# Patient Record
Sex: FEMALE | Race: WHITE | NOT HISPANIC OR LATINO | Employment: FULL TIME | URBAN - NONMETROPOLITAN AREA
[De-identification: names, ages, dates, MRNs, and addresses within clinical notes are randomized per-mention and may not be internally consistent; named-entity substitution may affect disease eponyms.]

---

## 2017-04-19 ENCOUNTER — OFFICE VISIT (OUTPATIENT)
Dept: MEDICAL GROUP | Facility: PHYSICIAN GROUP | Age: 47
End: 2017-04-19
Payer: COMMERCIAL

## 2017-04-19 VITALS
OXYGEN SATURATION: 97 % | DIASTOLIC BLOOD PRESSURE: 76 MMHG | HEART RATE: 107 BPM | RESPIRATION RATE: 18 BRPM | TEMPERATURE: 97.3 F | SYSTOLIC BLOOD PRESSURE: 118 MMHG | WEIGHT: 168 LBS | BODY MASS INDEX: 29.77 KG/M2 | HEIGHT: 63 IN

## 2017-04-19 DIAGNOSIS — B35.1 ONYCHOMYCOSIS: ICD-10-CM

## 2017-04-19 DIAGNOSIS — E04.1 THYROID NODULE: ICD-10-CM

## 2017-04-19 DIAGNOSIS — F41.9 ANXIETY: ICD-10-CM

## 2017-04-19 DIAGNOSIS — H57.11 PAIN OF RIGHT EYE: ICD-10-CM

## 2017-04-19 DIAGNOSIS — J31.0 RHINITIS, UNSPECIFIED TYPE: ICD-10-CM

## 2017-04-19 DIAGNOSIS — E66.9 OBESITY (BMI 35.0-39.9 WITHOUT COMORBIDITY): ICD-10-CM

## 2017-04-19 DIAGNOSIS — R73.01 ELEVATED FASTING GLUCOSE: ICD-10-CM

## 2017-04-19 DIAGNOSIS — E78.00 HIGH CHOLESTEROL: ICD-10-CM

## 2017-04-19 DIAGNOSIS — E55.9 VITAMIN D DEFICIENCY: ICD-10-CM

## 2017-04-19 PROCEDURE — 99214 OFFICE O/P EST MOD 30 MIN: CPT | Performed by: NURSE PRACTITIONER

## 2017-04-19 ASSESSMENT — PATIENT HEALTH QUESTIONNAIRE - PHQ9: CLINICAL INTERPRETATION OF PHQ2 SCORE: 1

## 2017-04-19 NOTE — PROGRESS NOTES
[facility]  Primary Care Office Visit - Problem-Oriented        History:     Sury Vidal is a 47 y.o. female who is here today to discuss Establish Care and Body Aches      Rhinitis  47-year-old female who suffers from chronic rhinitis and seasonal allergies. She is currently taking claratin D, her symptoms have improved significantly since adding a decongestant to her regimen this does make her a little jittery, she does have an increased pulse. She continues to have some nasal itching, scratchy throat, sensation of enlarged scratchy tongue which she does tell me has significantly improved with claratin D. She does not take any medications on a regular basis though she does tell me that she takes many herbal supplements. She denies throat swelling, trouble breathing, wheezing, coughing. She has had allergy testing, she has a slight sensitivity to cow milk. Discussed signs and symptoms of anaphylaxis and reasons to go to ER, she verbalizes understanding. At this time I would like her to continue Claritin D, start flonase and monitor for tongue or throat swelling as discussed above.          Anxiety  This is a 47-year-old female who is here to establish care and discuss multiple acute and chronic issues. She has struggled with anxiety for many years, one point she was on Zoloft and Xanax as needed.  She continues rare use of Xanax which was prescribed by her previous PCP. She is working on stress and anxiety modifications, exercise.    Onychomycosis  Patient continues to suffer from left foot onychomycosis. She has used over-the-counter antifungal spray without improvement in her symptoms.  She has thickened yellow toenails on fourth and fifth toe as well as her left great toe.  She has no itching or skin changes. She has seen podiatry who recommends diluted bleach.       Elevated fasting glucose  Patient continues to work on lifestyle modifications and exercise. She is due for fasting  "labs.    High cholesterol  As noted and this is a 47-year-old female with history of mixed hyperlipidemia, she is due for fasting labs. She is no longer on a statin. She is using over-the-counter omega-3 fatty acid. She prefers to avoid statin and take anymore \"natural route.\" She continues to work on diet and exercise.    Thyroid nodule  This is a 47-year-old female who was diagnosed with a thyroid nodule following a [employer] screening. She continues to have yearly thyroid ultrasound, last done in 2016 and normal. She has had a biopsy in 2014, unfortunately sample was insufficient.     Vitamin D deficiency  Has been over a year since vitamin D was checked, we will at least her labs today. She occasionally takes over-the-counter vitamin D.    Pain of right eye  Patient reports intermittent stabbing right eye pain, this is insidious in onset, resolves quickly on its own lasting only a few seconds.  She thinks this may be related to dry eye. She does not take any regular medications. She has had no vision changes. She is due to see her optometrist next month.           Past Medical History   Diagnosis Date   • GERD (gastroesophageal reflux disease)    • High cholesterol    • Anxiety    • AR (allergic rhinitis)    • Dizziness    • Heart palpitations    • Shortness of breath    • Tachycardia    • Elevated fasting glucose 11/27/2013      after 12 hour fast   • Hemorrhoids 10/1/2014     Past Surgical History   Procedure Laterality Date   • Salpingo-oophorectomy, unilateral       Social History     Social History   • Marital Status: Single     Spouse Name: N/A   • Number of Children: N/A   • Years of Education: N/A     Occupational History   • Not on file.     Social History Main Topics   • Smoking status: Former Smoker     Quit date: 11/27/2011   • Smokeless tobacco: Never Used   • Alcohol Use: No   • Drug Use: No   • Sexual Activity:     Partners: Male      Comment: one male partner     Other Topics Concern   • Not " "on file     Social History Narrative     History   Smoking status   • Former Smoker   • Quit date: 11/27/2011   Smokeless tobacco   • Never Used     Family History   Problem Relation Age of Onset   • Hyperlipidemia Mother    • Thyroid Mother      cancer   • Hyperlipidemia Father    • Thyroid Sister    • Diabetes Neg Hx    • Cancer Neg Hx      Allergies   Allergen Reactions   • Codeine        Problem List:     Patient Active Problem List    Diagnosis Date Noted   • Onychomycosis 04/19/2017   • Obesity (BMI 35.0-39.9 without comorbidity) (HCC) 04/19/2017   • Pain of right eye 04/19/2017   • Hemorrhoids 10/01/2014   • Thyroid nodule 07/16/2014   • Cervical disc disorder with radiculopathy 05/21/2014   • Rhinitis 02/26/2014   • Depression 02/26/2014   • Vitamin D deficiency 11/27/2013   • Elevated fasting glucose 11/27/2013   • Stress 03/07/2013   • GERD (gastroesophageal reflux disease)    • High cholesterol    • Anxiety          Medications:     Current outpatient prescriptions:   •  Multiple Vitamins-Minerals (MULTI FOR HER PO), Take  by mouth., Disp: , Rfl:   •  Loratadine (CLARITIN PO), Take  by mouth., Disp: , Rfl:   •  Omega-3 Fatty Acids (OMEGA 3 PO), Take  by mouth., Disp: , Rfl:       Review of Systems:     Positives per HPI, all other systems reviewed and WNL       Physical Assessment:     VS: /76 mmHg  Pulse 107  Temp(Src) 36.3 °C (97.3 °F)  Resp 18  Ht 1.588 m (5' 2.52\")  Wt 76.204 kg (168 lb)  BMI 30.22 kg/m2  SpO2 97%  LMP 03/21/2017    General: Well-developed, well-nourished, female, obese body habitus     Head: PERRL, EOMI. Normocephalic, oropharynx otherwise WNL. No facial asymmetry noted.  Neck: Neck supple, no thyromegaly  Cardiovasc:No JVD.  RRR, no MRG. No thrills or bruits. Pulses 2+ and symmetric at all distal extremities.  Pulmonary: Lungs clear bilaterally.  Normal respiratory effort. No wheeze or crackles.   Extremities: No edema, no TTP bilateral calves. Pedal pulses intact. No " joint effusions. LEs warm and well-perfused.  Neuro: Alert and oriented, CNs II-XII intact, no focal deficits.  Skin:No rashes noted. Skin warm, dry, intact    Lymph: No cervical, pre- or post-auricular, submandibular, occipital lymphadenopathy.    Psych: Dressed appropriately for the weather, pleasant and conversant.  Affect, mood & judgment appropriate.      Assessment/Plan:   Sury was seen today for establish care and body aches.    Diagnoses and all orders for this visit:    Thyroid nodule, stable  - continue to monitor annually   -     US-SOFT TISSUES OF HEAD - NECK; Future  -     TSH; Future  -     FREE THYROXINE; Future    Vitamin D deficiency, unclear control   -     VITAMIN D,25 HYDROXY; Future    High cholesterol, unclear control   - discussed TLM & OTC herbal supplements   -     LIPID PROFILE; Future    Elevated fasting glucose, unclear control   -     COMP METABOLIC PANEL; Future    Rhinitis, unspecified type, somewhat controlled   - continue claritin D, start flonase daily, follow up if sx worsen or persist.     Anxiety, somewhat controlled   - reinforce stress/anxiety management techniques, she continues rare use of xanax prescribed by her previous PCP.     Onychomycosis, ongoing, uncontrolled   - trial diluted water + bleach as recommended by podiatry, consider antifungal nail varnish. If sx persist she will follow up and we will consider oral Lamisil.     Obesity (BMI 35.0-39.9 without comorbidity) (HCC)  -     Patient identified as having weight management issue.  Appropriate orders and counseling given.    Pain of right eye, new   - follow up with optometry       HC maintenance- she has mammogram scheduled for next month     Patient is agreeable to the above plan and voiced understanding. All questions answered.     Please note that this dictation was created using voice recognition software. I have made every reasonable attempt to correct obvious errors, but I expect that there are errors of  grammar and possibly content that I did not discover before finalizing the note.      MARVIN Johnson  4/19/2017, 10:27 AM

## 2017-04-19 NOTE — MR AVS SNAPSHOT
"        Sury Abreu [place]   2017 9:40 AM   Office Visit   MRN: [id number]    Department:  [facility]   Dept Phone:  [phone]    Description:  Female : 1970   Provider:  PRITESH Light           Reason for Visit     Establish Care thyroid, allergies.     Body Aches pt thinks its specifically muscle related.       Allergies as of 2017     Allergen Noted Reactions    Codeine 2011         You were diagnosed with     Thyroid nodule   [038208]       Vitamin D deficiency   [6631265]       High cholesterol   [352084]       Elevated fasting glucose   [068548]       Rhinitis, unspecified type   [1878574]       Anxiety   [127962]       Onychomycosis   [275922]       Obesity (BMI 35.0-39.9 without comorbidity) ([facility])   [249154]         Vital Signs     Blood Pressure Pulse Temperature Respirations Height Weight    118/76 mmHg 107 36.3 °C (97.3 °F) 18 1.588 m (5' 2.52\") 76.204 kg (168 lb)    Body Mass Index Oxygen Saturation Last Menstrual Period Smoking Status          30.22 kg/m2 97% 2017 Former Smoker        Basic Information     Date Of Birth Sex Race Ethnicity Preferred Language    1970 Female Unknown Non- English      Your appointments     2017 10:00 AM   Adult Draw/Collection with LISSETH FOSTER (--)    [address] [facility] [address]   [phone]            2017  2:10 PM   MA [id number] with RB MG 2   [facility] ([address])    [address] [facility] [address]  [facility] [address] [phone]           No deodorant, powder, perfume or lotion under the arm or breast area.              Problem List              ICD-10-CM Priority Class Noted - Resolved    GERD (gastroesophageal reflux disease) K21.9   Unknown - Present    High cholesterol E78.00   Unknown - Present    Anxiety F41.9   Unknown - Present    Stress F43.9   3/7/2013 - Present    Vitamin D deficiency E55.9   2013 - Present    Elevated " fasting glucose R73.01   11/27/2013 - Present    Rhinitis J31.0   2/26/2014 - Present    Depression F32.9   2/26/2014 - Present    Cervical disc disorder with radiculopathy    5/21/2014 - Present    Thyroid nodule E04.1   7/16/2014 - Present    Hemorrhoids K64.9   10/1/2014 - Present    Onychomycosis B35.1   4/19/2017 - Present    Obesity (BMI 35.0-39.9 without comorbidity) ([facility]) E66.9   4/19/2017 - Present      Health Maintenance        Date Due Completion Dates    MAMMOGRAM 2/5/2017 2/5/2016, 2/20/2015, 2/10/2015, 12/24/2013    PAP SMEAR 4/4/2019 4/4/2016, 11/27/2013, 8/15/2009 (Prv Comp)    Override on 8/15/2009: Previously completed (normal)    IMM DTaP/Tdap/Td Vaccine (2 - Td) 5/23/2022 5/23/2012    COLONOSCOPY 8/15/2022 8/15/2012 (Prv Comp)    Override on 8/15/2012: Previously completed (GIC, normal)            Current Immunizations     Tdap Vaccine 5/23/2012      Below and/or attached are the medications your provider expects you to take. Review all of your home medications and newly ordered medications with your provider and/or pharmacist. Follow medication instructions as directed by your provider and/or pharmacist. Please keep your medication list with you and share with your provider. Update the information when medications are discontinued, doses are changed, or new medications (including over-the-counter products) are added; and carry medication information at all times in the event of emergency situations     Allergies:  CODEINE - (reactions not documented)               Medications  Valid as of: April 19, 2017 - 10:20 AM    Generic Name Brand Name Tablet Size Instructions for use    Loratadine   Take  by mouth.        Multiple Vitamins-Minerals   Take  by mouth.        Omega-3 Fatty Acids   Take  by mouth.        .                 Medicines prescribed today were sent to:     [facility] [address] [place] [address]    Phone: [phone] Fax:  [phone]    Open 24 Hours?: No      Medication refill instructions:       If your prescription bottle indicates you have medication refills left, it is not necessary to call your provider’s office. Please contact your pharmacy and they will refill your medication.    If your prescription bottle indicates you do not have any refills left, you may request refills at any time through one of the following ways: The online [employer] system (except Urgent Care), by calling your provider’s office, or by asking your pharmacy to contact your provider’s office with a refill request. Medication refills are processed only during regular business hours and may not be available until the next business day. Your provider may request additional information or to have a follow-up visit with you prior to refilling your medication.   *Please Note: Medication refills are assigned a new Rx number when refilled electronically. Your pharmacy may indicate that no refills were authorized even though a new prescription for the same medication is available at the pharmacy. Please request the medicine by name with the pharmacy before contacting your provider for a refill.        Your To Do List     Future Labs/Procedures Complete By Expires    COMP METABOLIC PANEL  As directed 4/20/2018    FREE THYROXINE  As directed 4/20/2018    LIPID PROFILE  As directed 4/20/2018    TSH  As directed 4/20/2018    US-SOFT TISSUES OF HEAD - NECK  As directed 4/19/2018    VITAMIN D,25 HYDROXY  As directed 4/19/2018         [employer] Access Code: Activation code not generated  Current [employer] Status: Active

## 2017-04-28 ENCOUNTER — HOSPITAL ENCOUNTER (OUTPATIENT)
Dept: RADIOLOGY | Facility: MEDICAL CENTER | Age: 47
End: 2017-04-28
Attending: NURSE PRACTITIONER
Payer: COMMERCIAL

## 2017-04-28 ENCOUNTER — TELEPHONE (OUTPATIENT)
Dept: RADIOLOGY | Facility: MEDICAL CENTER | Age: 47
End: 2017-04-28

## 2017-04-28 ENCOUNTER — HOSPITAL ENCOUNTER (OUTPATIENT)
Dept: LAB | Facility: MEDICAL CENTER | Age: 47
End: 2017-04-28
Attending: NURSE PRACTITIONER
Payer: COMMERCIAL

## 2017-04-28 DIAGNOSIS — Z13.9 SCREENING: ICD-10-CM

## 2017-04-28 DIAGNOSIS — E04.1 THYROID NODULE: ICD-10-CM

## 2017-04-28 DIAGNOSIS — E78.00 HIGH CHOLESTEROL: ICD-10-CM

## 2017-04-28 DIAGNOSIS — R73.01 ELEVATED FASTING GLUCOSE: ICD-10-CM

## 2017-04-28 DIAGNOSIS — E55.9 VITAMIN D DEFICIENCY: ICD-10-CM

## 2017-04-28 LAB
25(OH)D3 SERPL-MCNC: 30 NG/ML (ref 30–100)
ALBUMIN SERPL BCP-MCNC: 4.3 G/DL (ref 3.2–4.9)
ALBUMIN/GLOB SERPL: 1.5 G/DL
ALP SERPL-CCNC: 47 U/L (ref 30–99)
ALT SERPL-CCNC: 13 U/L (ref 2–50)
ANION GAP SERPL CALC-SCNC: 7 MMOL/L (ref 0–11.9)
AST SERPL-CCNC: 15 U/L (ref 12–45)
BILIRUB SERPL-MCNC: 0.5 MG/DL (ref 0.1–1.5)
BUN SERPL-MCNC: 12 MG/DL (ref 8–22)
CALCIUM SERPL-MCNC: 9.1 MG/DL (ref 8.5–10.5)
CHLORIDE SERPL-SCNC: 103 MMOL/L (ref 96–112)
CHOLEST SERPL-MCNC: 226 MG/DL (ref 100–199)
CO2 SERPL-SCNC: 27 MMOL/L (ref 20–33)
CREAT SERPL-MCNC: 0.55 MG/DL (ref 0.5–1.4)
GFR SERPL CREATININE-BSD FRML MDRD: >60 ML/MIN/1.73 M 2
GLOBULIN SER CALC-MCNC: 2.9 G/DL (ref 1.9–3.5)
GLUCOSE SERPL-MCNC: 88 MG/DL (ref 65–99)
HDLC SERPL-MCNC: 47 MG/DL
LDLC SERPL CALC-MCNC: 152 MG/DL
POTASSIUM SERPL-SCNC: 4 MMOL/L (ref 3.6–5.5)
PROT SERPL-MCNC: 7.2 G/DL (ref 6–8.2)
SODIUM SERPL-SCNC: 137 MMOL/L (ref 135–145)
T4 FREE SERPL-MCNC: 0.91 NG/DL (ref 0.53–1.43)
TRIGL SERPL-MCNC: 133 MG/DL (ref 0–149)
TSH SERPL DL<=0.005 MIU/L-ACNC: 2.32 UIU/ML (ref 0.3–3.7)

## 2017-04-28 PROCEDURE — 36415 COLL VENOUS BLD VENIPUNCTURE: CPT

## 2017-04-28 PROCEDURE — 84439 ASSAY OF FREE THYROXINE: CPT

## 2017-04-28 PROCEDURE — 84443 ASSAY THYROID STIM HORMONE: CPT

## 2017-04-28 PROCEDURE — 80053 COMPREHEN METABOLIC PANEL: CPT

## 2017-04-28 PROCEDURE — 77063 BREAST TOMOSYNTHESIS BI: CPT

## 2017-04-28 PROCEDURE — 80061 LIPID PANEL: CPT

## 2017-04-28 PROCEDURE — 82306 VITAMIN D 25 HYDROXY: CPT

## 2017-04-28 NOTE — TELEPHONE ENCOUNTER
PT HAD SCREENING MAMMO DONE TODAY. PT CALLED HER [employer] TO CHECK IF ANGEL (3D) WAS COVER IN HER PLAN. PER PT [facility] THEY WILL COVER HER ANGEL (3D) SCREENING MAMMO. PT WAS TOLD IF HER INS DID NOT COVER [facility] FROM [facility].

## 2017-05-02 ENCOUNTER — HOSPITAL ENCOUNTER (OUTPATIENT)
Facility: MEDICAL CENTER | Age: 47
End: 2017-05-02
Attending: NURSE PRACTITIONER
Payer: COMMERCIAL

## 2017-05-02 ENCOUNTER — OFFICE VISIT (OUTPATIENT)
Dept: URGENT CARE | Facility: PHYSICIAN GROUP | Age: 47
End: 2017-05-02
Payer: COMMERCIAL

## 2017-05-02 VITALS
OXYGEN SATURATION: 96 % | BODY MASS INDEX: 31.47 KG/M2 | TEMPERATURE: 98.8 F | SYSTOLIC BLOOD PRESSURE: 118 MMHG | WEIGHT: 171 LBS | DIASTOLIC BLOOD PRESSURE: 78 MMHG | RESPIRATION RATE: 16 BRPM | HEART RATE: 101 BPM | HEIGHT: 62 IN

## 2017-05-02 DIAGNOSIS — N93.0 BLEEDING AFTER INTERCOURSE: ICD-10-CM

## 2017-05-02 LAB
CANDIDA DNA VAG QL PROBE+SIG AMP: NEGATIVE
G VAGINALIS DNA VAG QL PROBE+SIG AMP: NEGATIVE
INT CON NEG: NEGATIVE
INT CON POS: POSITIVE
POC URINE PREGNANCY TEST: NORMAL
T VAGINALIS DNA VAG QL PROBE+SIG AMP: NEGATIVE

## 2017-05-02 PROCEDURE — 87480 CANDIDA DNA DIR PROBE: CPT

## 2017-05-02 PROCEDURE — 99214 OFFICE O/P EST MOD 30 MIN: CPT | Performed by: NURSE PRACTITIONER

## 2017-05-02 PROCEDURE — 87491 CHLMYD TRACH DNA AMP PROBE: CPT

## 2017-05-02 PROCEDURE — 81025 URINE PREGNANCY TEST: CPT | Performed by: NURSE PRACTITIONER

## 2017-05-02 PROCEDURE — 87510 GARDNER VAG DNA DIR PROBE: CPT

## 2017-05-02 PROCEDURE — 87591 N.GONORRHOEAE DNA AMP PROB: CPT

## 2017-05-02 PROCEDURE — 87660 TRICHOMONAS VAGIN DIR PROBE: CPT

## 2017-05-02 RX ORDER — FLUTICASONE PROPIONATE 50 MCG
1 SPRAY, SUSPENSION (ML) NASAL DAILY
COMMUNITY
End: 2018-11-16

## 2017-05-02 ASSESSMENT — ENCOUNTER SYMPTOMS
CHILLS: 0
FEVER: 0

## 2017-05-02 ASSESSMENT — PAIN SCALES - GENERAL: PAINLEVEL: NO PAIN

## 2017-05-02 NOTE — PROGRESS NOTES
Subjective:      Sury Vidal is a 47 y.o. female who presents with Menstrual Problem    Past Medical History   Diagnosis Date   • GERD (gastroesophageal reflux disease)    • High cholesterol    • Anxiety    • AR (allergic rhinitis)    • Dizziness    • Heart palpitations    • Shortness of breath    • Tachycardia    • Elevated fasting glucose 11/27/2013      after 12 hour fast   • Hemorrhoids 10/1/2014     Social History     Social History   • Marital Status: Single     Spouse Name: N/A   • Number of Children: N/A   • Years of Education: N/A     Occupational History   • Not on file.     Social History Main Topics   • Smoking status: Former Smoker     Quit date: 11/27/2011   • Smokeless tobacco: Never Used   • Alcohol Use: No   • Drug Use: No   • Sexual Activity:     Partners: Male      Comment: one male partner     Other Topics Concern   • Not on file     Social History Narrative     Family History   Problem Relation Age of Onset   • Hyperlipidemia Mother    • Thyroid Mother      cancer   • Hyperlipidemia Father    • Thyroid Sister    • Diabetes Neg Hx    • Cancer Neg Hx      Allergies: Codeine    This patient is a 47-year-old female who presents today with complaint of one episode of vaginal bleeding after intercourse. She states that she had spotting, and she is concerned about this. She complains also of pelvic discomfort. She denies any other vaginal discharge or odor. She is monogamous with one partner. Last menstrual period was 4/20. Patient states her partner has had a vasectomy.        Menstrual Problem  The patient's primary symptoms include vaginal bleeding. This is a new problem. The problem occurs intermittently. Progression since onset: once. The pain is mild. She is not pregnant. Pertinent negatives include no chills or fever. Associated symptoms comments: Vaginal bleeding post intercourse x 1 . The vaginal discharge was bloody. The vaginal bleeding is spotting. She has not been passing  "clots. She has not been passing tissue. The symptoms are aggravated by intercourse. She has tried nothing for the symptoms. She is sexually active. No, her partner does not have an STD. She uses vasectomy for contraception. Her menstrual history has been regular.       Review of Systems   Constitutional: Negative for fever and chills.   Genitourinary: Positive for menstrual problem.        Vaginal bleeding after intercourse x 1      All other systems reviewed and are negative      Objective:     /78 mmHg  Pulse 101  Temp(Src) 37.1 °C (98.8 °F)  Resp 16  Ht 1.575 m (5' 2.01\")  Wt 77.565 kg (171 lb)  BMI 31.27 kg/m2  SpO2 96%  LMP 04/20/2017     Physical Exam   Constitutional: She is oriented to person, place, and time. She appears well-developed and well-nourished. No distress.   Genitourinary: Vagina normal and uterus normal. Guaiac negative stool. No vaginal discharge found.   No CMT, no tenderness over the midline or the adnexa on bimanual exam.    Neurological: She is alert and oriented to person, place, and time.   Skin: Skin is warm and dry. She is not diaphoretic.   Psychiatric: She has a normal mood and affect. Her behavior is normal.   Vitals reviewed.    Cultures obtained for gonorrhea/chlamydia  Cultures obtained for vaginal pathogens      Urine hCG: negative           Assessment/Plan:   Vaginal bleeding- spotting   -cultures obtained as listed above   -Pelvic US ordered; patient will call to schedule   -follow up with PCP for annual PAP smear and wellness physical   There are no diagnoses linked to this encounter.      "

## 2017-05-02 NOTE — MR AVS SNAPSHOT
"        Sury Abreu [place]   2017 12:05 PM   Office Visit   MRN: [id number]    Department:  [facility]   Dept Phone:  [phone]    Description:  Female : 1970   Provider:  Cathey J Hamman, A.P.N.           Reason for Visit     Menstrual Problem irregular bleeding after sex      Allergies as of 2017     Allergen Noted Reactions    Codeine 2011         You were diagnosed with     Bleeding after intercourse   [306368]         Vital Signs     Blood Pressure Pulse Temperature Respirations Height Weight    118/78 mmHg 101 37.1 °C (98.8 °F) 16 1.575 m (5' 2.01\") 77.565 kg (171 lb)    Body Mass Index Oxygen Saturation Last Menstrual Period Smoking Status          31.27 kg/m2 96% 2017 Former Smoker        Basic Information     Date Of Birth Sex Race Ethnicity Preferred Language    1970 Female Unknown Non- English      Your appointments     2017  4:30 PM   US [id number] with VISTA US 2   [facility]    [address]  [facility] [address] [phone]            2017  7:40 AM   NEW TO YOU with Noreen Laurent M.D.   [facility] [address] [address] [phone]            2017  2:45 PM   New Patient with Thalia Navarro M.D.   [facility]    [address]  [facility] [id number] [phone]              Problem List              ICD-10-CM Priority Class Noted - Resolved    GERD (gastroesophageal reflux disease) K21.9   Unknown - Present    High cholesterol E78.00   Unknown - Present    Anxiety F41.9   Unknown - Present    Stress F43.9   3/7/2013 - Present    Vitamin D deficiency E55.9   2013 - Present    Elevated fasting glucose R73.01   2013 - Present    Rhinitis J31.0   2014 - Present    Depression F32.9   2014 - Present    Cervical disc disorder with radiculopathy    2014 - Present    Thyroid nodule " E04.1   7/16/2014 - Present    Hemorrhoids K64.9   10/1/2014 - Present    Onychomycosis B35.1   4/19/2017 - Present    Obesity (BMI 35.0-39.9 without comorbidity) ([facility]) E66.9   4/19/2017 - Present    Pain of right eye H57.11   4/19/2017 - Present      Health Maintenance        Date Due Completion Dates    MAMMOGRAM 4/28/2018 4/28/2017, 2/5/2016, 2/20/2015, 2/10/2015, 12/24/2013    PAP SMEAR 4/4/2019 4/4/2016, 11/27/2013, 8/15/2009 (Prv Comp)    Override on 8/15/2009: Previously completed (normal)    IMM DTaP/Tdap/Td Vaccine (2 - Td) 5/23/2022 5/23/2012    COLONOSCOPY 8/15/2022 8/15/2012 (Prv Comp)    Override on 8/15/2012: Previously completed (GIC, normal)            Results     POCT Pregnancy      Component Value Standard Range & Units    POC Urine Pregnancy Test neg Negative    Internal Control Positive Positive     Internal Control Negative Negative                         Current Immunizations     Tdap Vaccine 5/23/2012      Below and/or attached are the medications your provider expects you to take. Review all of your home medications and newly ordered medications with your provider and/or pharmacist. Follow medication instructions as directed by your provider and/or pharmacist. Please keep your medication list with you and share with your provider. Update the information when medications are discontinued, doses are changed, or new medications (including over-the-counter products) are added; and carry medication information at all times in the event of emergency situations     Allergies:  CODEINE - (reactions not documented)               Medications  Valid as of: May 02, 2017 -  2:52 PM    Generic Name Brand Name Tablet Size Instructions for use    Fluticasone Propionate (Suspension) FLONASE 50 MCG/ACT Spray 1 Spray in nose every day.        Loratadine   Take  by mouth.        Multiple Vitamins-Minerals   Take  by mouth.        Omega-3 Fatty Acids   Take  by mouth.        .                 Medicines  prescribed today were sent to:     [facility] [address] [place] [address], [place] [address] [place] [place] [address]    [address] [facility] [place] [address]    Phone: [phone] Fax: [phone]    Open 24 Hours?: No      Medication refill instructions:       If your prescription bottle indicates you have medication refills left, it is not necessary to call your provider’s office. Please contact your pharmacy and they will refill your medication.    If your prescription bottle indicates you do not have any refills left, you may request refills at any time through one of the following ways: The online [employer] system (except Urgent Care), by calling your provider’s office, or by asking your pharmacy to contact your provider’s office with a refill request. Medication refills are processed only during regular business hours and may not be available until the next business day. Your provider may request additional information or to have a follow-up visit with you prior to refilling your medication.   *Please Note: Medication refills are assigned a new Rx number when refilled electronically. Your pharmacy may indicate that no refills were authorized even though a new prescription for the same medication is available at the pharmacy. Please request the medicine by name with the pharmacy before contacting your provider for a refill.        Your To Do List     Future Labs/Procedures Complete By Expires    US-GYN-PELVIS TRANSVAGINAL  As directed 5/2/2018    VAGINAL PATHOGENS DNA PANEL  As directed 5/2/2018         [employer] Access Code: Activation code not generated  Current [employer] Status: Active

## 2017-05-06 LAB
C TRACH DNA SPEC QL NAA+PROBE: NEGATIVE
N GONORRHOEA DNA SPEC QL NAA+PROBE: NEGATIVE
SPECIMEN SOURCE: NORMAL

## 2017-06-10 ENCOUNTER — HOSPITAL ENCOUNTER (OUTPATIENT)
Dept: RADIOLOGY | Facility: MEDICAL CENTER | Age: 47
End: 2017-06-10
Attending: NURSE PRACTITIONER
Payer: COMMERCIAL

## 2017-06-10 DIAGNOSIS — E04.1 THYROID NODULE: ICD-10-CM

## 2017-06-10 DIAGNOSIS — N93.0 BLEEDING AFTER INTERCOURSE: ICD-10-CM

## 2017-06-10 PROCEDURE — 76830 TRANSVAGINAL US NON-OB: CPT

## 2017-06-10 PROCEDURE — 76536 US EXAM OF HEAD AND NECK: CPT

## 2017-06-15 ENCOUNTER — TELEPHONE (OUTPATIENT)
Dept: URGENT CARE | Facility: CLINIC | Age: 47
End: 2017-06-15

## 2017-06-15 DIAGNOSIS — N93.0 PCB (POST COITAL BLEEDING): ICD-10-CM

## 2018-05-01 ENCOUNTER — HOSPITAL ENCOUNTER (OUTPATIENT)
Dept: RADIOLOGY | Facility: MEDICAL CENTER | Age: 48
End: 2018-05-01
Attending: NURSE PRACTITIONER
Payer: COMMERCIAL

## 2018-05-01 DIAGNOSIS — Z12.31 VISIT FOR SCREENING MAMMOGRAM: ICD-10-CM

## 2018-05-01 PROCEDURE — 77067 SCR MAMMO BI INCL CAD: CPT

## 2018-11-16 ENCOUNTER — OFFICE VISIT (OUTPATIENT)
Dept: MEDICAL GROUP | Facility: MEDICAL CENTER | Age: 48
End: 2018-11-16
Attending: INTERNAL MEDICINE
Payer: COMMERCIAL

## 2018-11-16 VITALS
TEMPERATURE: 98.6 F | HEIGHT: 62 IN | WEIGHT: 164 LBS | DIASTOLIC BLOOD PRESSURE: 68 MMHG | BODY MASS INDEX: 30.18 KG/M2 | OXYGEN SATURATION: 97 % | SYSTOLIC BLOOD PRESSURE: 108 MMHG | HEART RATE: 88 BPM | RESPIRATION RATE: 16 BRPM

## 2018-11-16 DIAGNOSIS — R73.01 ELEVATED FASTING GLUCOSE: ICD-10-CM

## 2018-11-16 DIAGNOSIS — E04.1 THYROID NODULE: ICD-10-CM

## 2018-11-16 DIAGNOSIS — F41.9 ANXIETY: ICD-10-CM

## 2018-11-16 DIAGNOSIS — E78.00 PURE HYPERCHOLESTEROLEMIA: ICD-10-CM

## 2018-11-16 PROBLEM — H57.11 PAIN OF RIGHT EYE: Status: RESOLVED | Noted: 2017-04-19 | Resolved: 2018-11-16

## 2018-11-16 PROBLEM — E66.9 OBESITY (BMI 35.0-39.9 WITHOUT COMORBIDITY): Status: RESOLVED | Noted: 2017-04-19 | Resolved: 2018-11-16

## 2018-11-16 PROCEDURE — 99214 OFFICE O/P EST MOD 30 MIN: CPT | Performed by: INTERNAL MEDICINE

## 2018-11-16 PROCEDURE — 99203 OFFICE O/P NEW LOW 30 MIN: CPT | Performed by: INTERNAL MEDICINE

## 2018-11-16 ASSESSMENT — PAIN SCALES - GENERAL: PAINLEVEL: NO PAIN

## 2018-11-16 NOTE — ASSESSMENT & PLAN NOTE
She has a history of elevated cholesterol and has been on lovastatin in the past.  She was able to lose quite a bit of weight and stopped her cholesterol medication about 4 years ago.  Her last lipid panel was checked in 2017 and was mildly elevated.

## 2018-11-16 NOTE — ASSESSMENT & PLAN NOTE
She has a history of a thyroid nodule first diagnosed in 2014.  She had a biopsy done at that time but the specimen was inadequate.  She has been monitored with ultrasound testing last in 2017.  There was no change in size of the nodule since then.  Her mother has a history of thyroid cancer which is why the patient has been so vigilant about this nodule.  She denies dysphagia, change in voice, unintentional weight loss, lumps or bumps in the neck region.

## 2018-11-16 NOTE — ASSESSMENT & PLAN NOTE
She has struggled with anxiety intermittently in the past.  She was previously taking Zoloft and Xanax.  She has been able to stop both of these medications with lifestyle modifications.  She has adopted a daily exercise routine as well as other behavioral modifications to help her deal with stress.  She has seen a therapist in the past.  She does report increased stress lately as she has recently moved to [place] from [place] and is also living with her elderly mother.  She has also had a change in career.  States that people around her have said she is more parra.  She currently denies depression.  She has not had any panic attacks.  She mostly controls her symptoms with exercise and healthy living.

## 2018-11-16 NOTE — PROGRESS NOTES
Sury Vidal is a 48 y.o. female here for mood swings, history of high cholesterol and thyroid nodule requesting labs, establish care  HPI:    Thyroid nodule  She has a history of a thyroid nodule first diagnosed in 2014.  She had a biopsy done at that time but the specimen was inadequate.  She has been monitored with ultrasound testing last in 2017.  There was no change in size of the nodule since then.  Her mother has a history of thyroid cancer which is why the patient has been so vigilant about this nodule.  She denies dysphagia, change in voice, unintentional weight loss, lumps or bumps in the neck region.    Pure hypercholesterolemia  She has a history of elevated cholesterol and has been on lovastatin in the past.  She was able to lose quite a bit of weight and stopped her cholesterol medication about 4 years ago.  Her last lipid panel was checked in 2017 and was mildly elevated.    Elevated fasting glucose  She has a history of prediabetes with an A1c of 5.7 on most recent screening done in 2016.  She is requesting blood sugar testing today.    Anxiety  She has struggled with anxiety intermittently in the past.  She was previously taking Zoloft and Xanax.  She has been able to stop both of these medications with lifestyle modifications.  She has adopted a daily exercise routine as well as other behavioral modifications to help her deal with stress.  She has seen a therapist in the past.  She does report increased stress lately as she has recently moved to [facility] and is also living with her elderly mother.  She has also had a change in career.  States that people around her have said she is more parra.  She currently denies depression.  She has not had any panic attacks.  She mostly controls her symptoms with exercise and healthy living.    Current medicines (including changes today)  No current outpatient prescriptions on file.     No current facility-administered medications for this visit.  "     She  has a past medical history of Anxiety; Depression; Fibroid; GERD (gastroesophageal reflux disease); Hemorrhoids (10/1/2014); High cholesterol; Tachycardia; and Thyroid nodule.  She  has a past surgical history that includes salpingo-oophorectomy, unilateral and bartholin gland excision.  Social History   Substance Use Topics   • Smoking status: Former Smoker     Packs/day: 0.25     Years: 5.00     Types: Cigarettes     Quit date: 11/27/2011   • Smokeless tobacco: Never Used   • Alcohol use No      Comment: few drinks a year     Social History     Social History Narrative   • No narrative on file     Family History   Problem Relation Age of Onset   • Hyperlipidemia Mother    • Hypertension Mother    • Cancer Mother         thyroid   • Hyperlipidemia Father    • Hypertension Father    • Thyroid Sister    • Heart Disease Paternal Grandfather         MI   • Diabetes Neg Hx          ROS  As above in HPI  All other systems reviewed and are negative     Objective:     Blood pressure 108/68, pulse 88, temperature 37 °C (98.6 °F), temperature source Temporal, resp. rate 16, height 1.575 m (5' 2.01\"), weight 74.4 kg (164 lb), SpO2 97 %. Body mass index is 29.99 kg/m².  Physical Exam:    Constitutional: Alert, no distress.  Skin: Warm, dry, good turgor, no rashes in visible areas.  Eye: Equal, round and reactive, conjunctiva clear, lids normal.  ENMT: Lips without lesions, good dentition, oropharynx clear, TM's clear bilaterally.  Neck: Trachea midline, no masses, no thyromegaly or palpable nodules. No cervical or supraclavicular lymphadenopathy.  Respiratory: Unlabored respiratory effort, lungs clear to auscultation, no wheezes, no ronchi.  Cardiovascular: Regular rate and rhythm, no murmurs appreciated, no lower extremity edema.  Abdomen: Soft, non-tender, no masses, no hepatosplenomegaly.  Psych: Alert and oriented x3, normal affect and mood.        Assessment and Plan:   The following treatment plan was " discussed    1. Pure hypercholesterolemia  Currently off of medication.  We will update labs.  - Lipid Profile; Future    2. Thyroid nodule  Has not needed to be on thyroid replacement in the past.  We discussed that since her nodule has been very stable, she likely does not need to repeat an ultrasound at this time.  It is something she could consider down the road once she has insurance in place.  At this point, I do not think it would add much to our care and would be quite expensive for her as she is access to healthcare and would have to pay out of pocket.  We will get thyroid labs to make sure she is still euthyroid.  - TSH WITH REFLEX TO FT4; Future    3. Elevated fasting glucose  Currently off of medication.  We will follow-up with updated labs  - HEMOGLOBIN A1C; Future    4. Anxiety  She is able to control symptoms for the most part with lifestyle modifications.  We discussed starting yoga which she is interested in doing.  We discussed possibly getting in with a therapist and she is also open to this.  She prefers to avoid any medication at this time.  I wrote down the names of several therapy offices for her and she plans to call around to see who is excepting cash pay and what the prices would be.  -Patient will follow up with therapy  -She will continue lifestyle modifications, exercise, may consider starting yoga        Followup: Return in about 1 year (around 11/16/2019), or if symptoms worsen or fail to improve, for annual.

## 2018-11-16 NOTE — ASSESSMENT & PLAN NOTE
She has a history of prediabetes with an A1c of 5.7 on most recent screening done in 2016.  She is requesting blood sugar testing today.

## 2018-11-21 ENCOUNTER — HOSPITAL ENCOUNTER (OUTPATIENT)
Dept: LAB | Facility: MEDICAL CENTER | Age: 48
End: 2018-11-21
Attending: INTERNAL MEDICINE
Payer: COMMERCIAL

## 2018-11-21 DIAGNOSIS — E78.00 PURE HYPERCHOLESTEROLEMIA: ICD-10-CM

## 2018-11-21 DIAGNOSIS — R73.01 ELEVATED FASTING GLUCOSE: ICD-10-CM

## 2018-11-21 DIAGNOSIS — E04.1 THYROID NODULE: ICD-10-CM

## 2018-11-21 LAB
CHOLEST SERPL-MCNC: 288 MG/DL (ref 100–199)
EST. AVERAGE GLUCOSE BLD GHB EST-MCNC: 123 MG/DL
FASTING STATUS PATIENT QL REPORTED: NORMAL
HBA1C MFR BLD: 5.9 % (ref 0–5.6)
HDLC SERPL-MCNC: 51 MG/DL
LDLC SERPL CALC-MCNC: 202 MG/DL
TRIGL SERPL-MCNC: 177 MG/DL (ref 0–149)
TSH SERPL DL<=0.005 MIU/L-ACNC: 1.99 UIU/ML (ref 0.38–5.33)

## 2018-11-21 PROCEDURE — 36415 COLL VENOUS BLD VENIPUNCTURE: CPT

## 2018-11-21 PROCEDURE — 84443 ASSAY THYROID STIM HORMONE: CPT

## 2018-11-21 PROCEDURE — 80061 LIPID PANEL: CPT

## 2018-11-21 PROCEDURE — 83036 HEMOGLOBIN GLYCOSYLATED A1C: CPT

## 2019-08-30 ENCOUNTER — OFFICE VISIT (OUTPATIENT)
Dept: MEDICAL GROUP | Facility: MEDICAL CENTER | Age: 49
End: 2019-08-30
Attending: INTERNAL MEDICINE
Payer: COMMERCIAL

## 2019-08-30 VITALS
HEART RATE: 88 BPM | TEMPERATURE: 98.5 F | RESPIRATION RATE: 16 BRPM | BODY MASS INDEX: 31.47 KG/M2 | OXYGEN SATURATION: 94 % | HEIGHT: 62 IN | SYSTOLIC BLOOD PRESSURE: 110 MMHG | WEIGHT: 171 LBS | DIASTOLIC BLOOD PRESSURE: 78 MMHG

## 2019-08-30 DIAGNOSIS — R21 RASH: ICD-10-CM

## 2019-08-30 DIAGNOSIS — R73.03 PREDIABETES: ICD-10-CM

## 2019-08-30 DIAGNOSIS — L98.9 SKIN LESION: ICD-10-CM

## 2019-08-30 DIAGNOSIS — E66.9 OBESITY (BMI 30-39.9): ICD-10-CM

## 2019-08-30 DIAGNOSIS — F41.9 ANXIETY: ICD-10-CM

## 2019-08-30 DIAGNOSIS — E04.1 THYROID NODULE: ICD-10-CM

## 2019-08-30 DIAGNOSIS — E78.2 MIXED HYPERLIPIDEMIA: ICD-10-CM

## 2019-08-30 DIAGNOSIS — B35.1 ONYCHOMYCOSIS: ICD-10-CM

## 2019-08-30 PROCEDURE — 99213 OFFICE O/P EST LOW 20 MIN: CPT | Performed by: INTERNAL MEDICINE

## 2019-08-30 PROCEDURE — 99214 OFFICE O/P EST MOD 30 MIN: CPT | Performed by: INTERNAL MEDICINE

## 2019-08-30 RX ORDER — TRIAMCINOLONE ACETONIDE 1 MG/G
CREAM TOPICAL
Qty: 45 G | Refills: 3 | Status: SHIPPED | OUTPATIENT
Start: 2019-08-30 | End: 2021-10-08

## 2019-08-30 RX ORDER — TRIAMCINOLONE ACETONIDE 1 MG/G
CREAM TOPICAL
Qty: 45 G | Refills: 3 | Status: SHIPPED | OUTPATIENT
Start: 2019-08-30 | End: 2019-08-30 | Stop reason: SDUPTHER

## 2019-08-30 RX ORDER — TERBINAFINE HYDROCHLORIDE 250 MG/1
250 TABLET ORAL DAILY
Qty: 30 TAB | Refills: 3 | Status: SHIPPED | OUTPATIENT
Start: 2019-08-30 | End: 2020-10-14

## 2019-08-30 ASSESSMENT — PAIN SCALES - GENERAL: PAINLEVEL: NO PAIN

## 2019-08-30 ASSESSMENT — PATIENT HEALTH QUESTIONNAIRE - PHQ9: CLINICAL INTERPRETATION OF PHQ2 SCORE: 0

## 2019-08-30 NOTE — ASSESSMENT & PLAN NOTE
The 10-year ASCVD risk score (Leroy KHALIL Jr., et al., 2013) is: 1.6% she is not on any lipid-lowering medications currently.

## 2019-08-30 NOTE — ASSESSMENT & PLAN NOTE
She reports a very itchy rash that broke out about 4 months ago.  It started on her low back and spread to the left abdominal region and left groin region.  She then started noticing it on the ulnar surface of the forearms.  She had this happen before and saw a dermatologist who prescribed triamcinolone cream.  She states that this worked well and she is requesting a refill today.  She does report increased stress but she denies any contacts with new skin care products, detergents.  No childhood history of eczema.  States that her seasonal allergies have been bad lately and she is also been more stressed.

## 2019-08-31 ENCOUNTER — HOSPITAL ENCOUNTER (OUTPATIENT)
Dept: LAB | Facility: MEDICAL CENTER | Age: 49
End: 2019-08-31
Attending: INTERNAL MEDICINE
Payer: COMMERCIAL

## 2019-08-31 DIAGNOSIS — R73.03 PREDIABETES: ICD-10-CM

## 2019-08-31 DIAGNOSIS — E78.2 MIXED HYPERLIPIDEMIA: ICD-10-CM

## 2019-08-31 DIAGNOSIS — B35.1 ONYCHOMYCOSIS: ICD-10-CM

## 2019-08-31 DIAGNOSIS — E04.1 THYROID NODULE: ICD-10-CM

## 2019-08-31 LAB
ALBUMIN SERPL BCP-MCNC: 4.2 G/DL (ref 3.2–4.9)
ALBUMIN/GLOB SERPL: 1.4 G/DL
ALP SERPL-CCNC: 48 U/L (ref 30–99)
ALT SERPL-CCNC: 18 U/L (ref 2–50)
ANION GAP SERPL CALC-SCNC: 7 MMOL/L (ref 0–11.9)
AST SERPL-CCNC: 15 U/L (ref 12–45)
BILIRUB SERPL-MCNC: 0.4 MG/DL (ref 0.1–1.5)
BUN SERPL-MCNC: 14 MG/DL (ref 8–22)
CALCIUM SERPL-MCNC: 9.5 MG/DL (ref 8.5–10.5)
CHLORIDE SERPL-SCNC: 103 MMOL/L (ref 96–112)
CHOLEST SERPL-MCNC: 265 MG/DL (ref 100–199)
CO2 SERPL-SCNC: 28 MMOL/L (ref 20–33)
CREAT SERPL-MCNC: 0.58 MG/DL (ref 0.5–1.4)
EST. AVERAGE GLUCOSE BLD GHB EST-MCNC: 114 MG/DL
FASTING STATUS PATIENT QL REPORTED: NORMAL
GLOBULIN SER CALC-MCNC: 3 G/DL (ref 1.9–3.5)
GLUCOSE SERPL-MCNC: 97 MG/DL (ref 65–99)
HBA1C MFR BLD: 5.6 % (ref 0–5.6)
HDLC SERPL-MCNC: 46 MG/DL
LDLC SERPL CALC-MCNC: 186 MG/DL
POTASSIUM SERPL-SCNC: 4.1 MMOL/L (ref 3.6–5.5)
PROT SERPL-MCNC: 7.2 G/DL (ref 6–8.2)
SODIUM SERPL-SCNC: 138 MMOL/L (ref 135–145)
TRIGL SERPL-MCNC: 163 MG/DL (ref 0–149)
TSH SERPL DL<=0.005 MIU/L-ACNC: 2.53 UIU/ML (ref 0.38–5.33)

## 2019-08-31 PROCEDURE — 80061 LIPID PANEL: CPT

## 2019-08-31 PROCEDURE — 83036 HEMOGLOBIN GLYCOSYLATED A1C: CPT

## 2019-08-31 PROCEDURE — 84443 ASSAY THYROID STIM HORMONE: CPT

## 2019-08-31 PROCEDURE — 80053 COMPREHEN METABOLIC PANEL: CPT

## 2019-08-31 PROCEDURE — 36415 COLL VENOUS BLD VENIPUNCTURE: CPT

## 2019-08-31 NOTE — ASSESSMENT & PLAN NOTE
She has had a stable nodule over several years.  Her thyroid testing done 1 year ago was within normal limits.

## 2019-08-31 NOTE — ASSESSMENT & PLAN NOTE
She has had a mildly elevated A1c in the past.  She has gained a little bit of weight recently.  She is due for updated A1c.

## 2019-08-31 NOTE — TELEPHONE ENCOUNTER
Was the patient seen in the last year in this department? Yes    Does patient have an active prescription for medications requested? Yes    Received Request Via: Pharmacy       Pt asked to have script resent to [facility] in [place] as she will be paying out of pocket and would like to purchase it there.

## 2019-08-31 NOTE — ASSESSMENT & PLAN NOTE
She reports her anxiety has been worse lately.  She does not want to start on any medication although she has been on Zoloft and Xanax in the past.  She prefers to get back to exercising and try behavioral modification.

## 2019-08-31 NOTE — PROGRESS NOTES
Subjective:   Sury Vidal is a 49 y.o. female here today for itchy rash for 4 months, yearly follow-up, skin lesion on nose, nail fungus    Rash  She reports a very itchy rash that broke out about 4 months ago.  It started on her low back and spread to the left abdominal region and left groin region.  She then started noticing it on the ulnar surface of the forearms.  She had this happen before and saw a dermatologist who prescribed triamcinolone cream.  She states that this worked well and she is requesting a refill today.  She does report increased stress but she denies any contacts with new skin care products, detergents.  No childhood history of eczema.  States that her seasonal allergies have been bad lately and she is also been more stressed.     Mixed hyperlipidemia  The 10-year ASCVD risk score (Herndon DC Jr., et al., 2013) is: 1.6% she is not on any lipid-lowering medications currently.    Skin lesion  She reports a rough scaly lesion on the bridge of her nose.  States that it was present in October 2018 when she saw a dermatologist.  He was treated with cryotherapy at that point but it has returned.  Denies bleeding, itching, or growth of the area.  She has a history of a squamous cell carcinoma of the skin on her chest but no other significant skin cancer history.    Onychomycosis  She reports toenail fungus for several years.  She was treated with a topical solution by her dermatologist.  She was not able to continue using this but states it did work.  However, the fungus has returned and she would like to try an alternative treatment.    Thyroid nodule  She has had a stable nodule over several years.  Her thyroid testing done 1 year ago was within normal limits.    Prediabetes  She has had a mildly elevated A1c in the past.  She has gained a little bit of weight recently.  She is due for updated A1c.    Anxiety  She reports her anxiety has been worse lately.  She does not want to start on any  medication although she has been on Zoloft and Xanax in the past.  She prefers to get back to exercising and try behavioral modification.         Current medicines (including changes today)  Current Outpatient Medications   Medication Sig Dispense Refill   • triamcinolone acetonide (KENALOG) 0.1 % Cream Apply thin layer to rash on arm, abdomen, and back twice daily as needed until resolves 45 g 3   • terbinafine (LAMISIL) 250 MG Tab Take 1 Tab by mouth every day. 30 Tab 3     No current facility-administered medications for this visit.      She  has a past medical history of Anxiety, Depression, Fibroid, GERD (gastroesophageal reflux disease), Hemorrhoids (10/1/2014), High cholesterol, Tachycardia, and Thyroid nodule.    ROS   Denies chest pain, shortness of breath  As above in HPI     Objective:     Vitals:    08/30/19 1305   BP: 110/78   Pulse: 88   Resp: 16   Temp: 36.9 °C (98.5 °F)   SpO2: 94%     Body mass index is 31.27 kg/m².   Physical Exam:  Constitutional: Alert, no distress.  Skin: Warm, dry, good turgor, hyperpigmented papular rash over lumbosacral region, left forearm, left lower abdominal wall, small rough patch over bridge of nose concerning for actinic keratosis.  Eye: Equal, round and reactive, conjunctiva clear, lids normal.  ENMT: Lips without lesions, good dentition, oropharynx clear, TM's clear bilaterally  Neck: Trachea midline, no masses, no thyromegaly. No cervical or supraclavicular lymphadenopathy  Respiratory: Unlabored respiratory effort, lungs clear to auscultation, no wheezes, no ronchi.  Cardiovascular: Regular rate and rhythm, no murmurs appreciated, no lower extremity edema  Psych: Alert and oriented x3, normal affect and mood.    CRYOTHERAPY:  Discussed risks and benefits of cryotherapy. Patient verbally agreed. 3 applications of cryotherapy were applied to scaly lesion on bridge of nose. Patient tolerated procedure well. Aftercare instructions given.    Assessment and Plan:   The  following treatment plan was discussed    1. Rash  Almost appears eczematous in nature.  Has responded well in the past to triamcinolone cream.  I have represcribed this today  -Triamcinolone 0.1% cream twice daily as needed    2. Skin lesion  Concern for actinic keratosis, treated with cryotherapy today.  Patient will continue to monitor lesion    3. Prediabetes  - HEMOGLOBIN A1C; Future    4. Thyroid nodule  - TSH WITH REFLEX TO FT4; Future    5. Onychomycosis  We will obtain updated labs and start her on Lamisil if LFTs are normal  -Lamisil 250 mg daily x12 weeks, she will  the prescription after she gets her labs done  - Comp Metabolic Panel; Future    6. Mixed hyperlipidemia  Currently off of lipid-lowering medication  - Lipid Profile; Future    7. Anxiety  Somewhat uncontrolled however she prefers to remain off of medication and use behavioral strategies.  We will continue to monitor.    8. Obesity (BMI 30-39.9)  - Patient identified as having weight management issue.  Appropriate orders and counseling given.        Followup: Return in about 6 months (around 2/29/2020), or if symptoms worsen or fail to improve.

## 2019-08-31 NOTE — ASSESSMENT & PLAN NOTE
She reports toenail fungus for several years.  She was treated with a topical solution by her dermatologist.  She was not able to continue using this but states it did work.  However, the fungus has returned and she would like to try an alternative treatment.

## 2019-08-31 NOTE — ASSESSMENT & PLAN NOTE
She reports a rough scaly lesion on the bridge of her nose.  States that it was present in October 2018 when she saw a dermatologist.  He was treated with cryotherapy at that point but it has returned.  Denies bleeding, itching, or growth of the area.  She has a history of a squamous cell carcinoma of the skin on her chest but no other significant skin cancer history.

## 2019-10-30 ENCOUNTER — PATIENT MESSAGE (OUTPATIENT)
Dept: MEDICAL GROUP | Facility: MEDICAL CENTER | Age: 49
End: 2019-10-30

## 2019-10-31 ENCOUNTER — TELEPHONE (OUTPATIENT)
Dept: MEDICAL GROUP | Facility: MEDICAL CENTER | Age: 49
End: 2019-10-31

## 2019-10-31 NOTE — TELEPHONE ENCOUNTER
From: Sury Vidal  To: Edelmira Saucedo M.D.  Sent: 10/30/2019 9:41 PM PDT  Subject: Prescription Question    Can we do labs to check my liver for the Terbinafine? I started it 9/11. I do have to say the welt-like rash that I came in for is worse. Don't know if it's a side effect of the med that's just making it worse or if my stress is just getting worse. I'm using the cream you prescribed and cetaphyl lotion, which helps. I haven't been feeling good at all though. I get tired easily, energy is low, but it could be stress. My throat feels almost scratchy all the time but then I have allergies too and I'm not taking anything for that right now. Urine is fairly clear unless I'm short on water. I do notice a color difference since starting the Terbinafine. I can make an appt. if you need to see me. Is it a 4 month treatment? Thank you.

## 2019-10-31 NOTE — TELEPHONE ENCOUNTER
----- Message from Sury Vidal sent at 10/30/2019  9:41 PM PDT -----  Regarding: Prescription Question  Contact: [phone]  Can we do labs to check my liver for the Terbinafine?  I started it 9/11. I do have to say the welt-like rash that I came in for is worse. Don't know if it's a side effect of the med that's just making it worse or if my stress is just getting worse. I'm using the cream you prescribed and cetaphyl lotion, which helps. I haven't been feeling good at all though.  I get tired easily,  energy is low, but it could be stress.  My throat feels almost scratchy all the time but then I have allergies too and I'm not taking anything for that right now. Urine is fairly clear unless I'm short on water. I do notice a color difference since starting the Terbinafine.  I can make an appt. if you need to see me.  Is it a 4 month treatment?  Thank you.

## 2019-11-12 ENCOUNTER — OFFICE VISIT (OUTPATIENT)
Dept: MEDICAL GROUP | Facility: MEDICAL CENTER | Age: 49
End: 2019-11-12
Attending: INTERNAL MEDICINE
Payer: COMMERCIAL

## 2019-11-12 VITALS
OXYGEN SATURATION: 96 % | DIASTOLIC BLOOD PRESSURE: 80 MMHG | HEART RATE: 82 BPM | WEIGHT: 173 LBS | TEMPERATURE: 98.3 F | BODY MASS INDEX: 31.83 KG/M2 | HEIGHT: 62 IN | RESPIRATION RATE: 16 BRPM | SYSTOLIC BLOOD PRESSURE: 118 MMHG

## 2019-11-12 DIAGNOSIS — F41.9 ANXIETY: ICD-10-CM

## 2019-11-12 DIAGNOSIS — R21 RASH: ICD-10-CM

## 2019-11-12 DIAGNOSIS — B35.1 ONYCHOMYCOSIS: ICD-10-CM

## 2019-11-12 DIAGNOSIS — E78.2 MIXED HYPERLIPIDEMIA: ICD-10-CM

## 2019-11-12 PROBLEM — L98.9 SKIN LESION: Status: RESOLVED | Noted: 2019-08-30 | Resolved: 2019-11-12

## 2019-11-12 PROCEDURE — 99213 OFFICE O/P EST LOW 20 MIN: CPT | Performed by: INTERNAL MEDICINE

## 2019-11-12 PROCEDURE — 99214 OFFICE O/P EST MOD 30 MIN: CPT | Performed by: INTERNAL MEDICINE

## 2019-11-12 RX ORDER — BUSPIRONE HYDROCHLORIDE 10 MG/1
TABLET ORAL
Qty: 60 TAB | Refills: 2 | Status: SHIPPED | OUTPATIENT
Start: 2019-11-12 | End: 2019-12-24 | Stop reason: SDUPTHER

## 2019-11-12 ASSESSMENT — PAIN SCALES - GENERAL: PAINLEVEL: NO PAIN

## 2019-11-12 NOTE — ASSESSMENT & PLAN NOTE
She has been taking the terbinafine for about 2 months now.  She has noticed some improvement in her nail fungus.  She is also noticed that her depression has gotten a little worse and she is wondering if this is just coincidental or if it could be medication related as she did note depression as 1 of the side effects listed on the terbinafine drug insert.

## 2019-11-12 NOTE — ASSESSMENT & PLAN NOTE
She had quite severe hyperlipidemia on her last labs in September although her ASCVD risk score is relatively low.  Her LDL has consistently been close to 190 or above.  She reports a strong family history of hyperlipidemia.  She is willing to get tested for FH through [employer] [facility].

## 2019-11-12 NOTE — PROGRESS NOTES
Subjective:   Sury Vidal is a 49 y.o. female here today for anxiety worsening, f/u rash and nail fungus    Anxiety  Patient reports worsening anxiety over the past few months.  It is starting to affect her sleep and she feels like she is not able to get done the tasks she needs to accomplish during the day.  She is struggling currently with her relationship with her fiancé as well as her job.  She attributes the increased stress to these things.  In the past, she has been on numerous treatments for anxiety including Zoloft, Xanax, and she believes another SSRI but she is not sure.  Thinks it might have been Lexapro.  She has not noticed a huge improvement with these medications.  Her mom currently takes BuSpar and she would like to try this medication.  She is not currently seeing a therapist but is interested.     Mixed hyperlipidemia  She had quite severe hyperlipidemia on her last labs in September although her ASCVD risk score is relatively low.  Her LDL has consistently been close to 190 or above.  She reports a strong family history of hyperlipidemia.  She is willing to get tested for FH through [employer] [facility].    Onychomycosis  She has been taking the terbinafine for about 2 months now.  She has noticed some improvement in her nail fungus.  She is also noticed that her depression has gotten a little worse and she is wondering if this is just coincidental or if it could be medication related as she did note depression as 1 of the side effects listed on the terbinafine drug insert.    Rash  She continues to have an itchy rash over her lower back that comes and goes.  It does improved with the kenalog cream but never fully resolves.  States it was severe last week but has been subsiding and is now mild.  Sometimes gets a similar rash on her elbows.  Also states she gets some scalp itching with some fine bumps.  Otherwise she does not notice it anywhere else.       Current medicines (including  changes today)  Current Outpatient Medications   Medication Sig Dispense Refill   • busPIRone (BUSPAR) 10 MG Tab tablet 1/2 tab twice a day for 5 days, then may increase to 1 tab in the morning and 1/2 tab in evening x 5 days, thenincrease to 1 tab twice daily 60 Tab 2   • terbinafine (LAMISIL) 250 MG Tab Take 1 Tab by mouth every day. 30 Tab 3   • triamcinolone acetonide (KENALOG) 0.1 % Cream Apply thin layer to rash on arm, abdomen, and back twice daily as needed until resolves 45 g 3     No current facility-administered medications for this visit.      She  has a past medical history of Anxiety, Depression, Fibroid, GERD (gastroesophageal reflux disease), Hemorrhoids (10/1/2014), High cholesterol, Tachycardia, and Thyroid nodule.    ROS   Denies chest pain, shortness of breath  As above in HPI     Objective:     Vitals:    11/12/19 0846   BP: 118/80   Pulse: 82   Resp: 16   Temp: 36.8 °C (98.3 °F)   SpO2: 96%     Body mass index is 31.63 kg/m².   Physical Exam:  Constitutional: Alert, no distress.  Skin: Warm, dry, good turgor, fine hyperpigmented papular rash over lower back with some excoriations, no erythema  Eye: Equal, round and reactive, conjunctiva clear, lids normal.  Psych: Alert and oriented x3, normal affect and mood.      Results and Imaging:   Lab Results   Component Value Date/Time    CHOLSTRLTOT 265 (H) 08/31/2019 07:52 AM     (H) 08/31/2019 07:52 AM    HDL 46 08/31/2019 07:52 AM    TRIGLYCERIDE 163 (H) 08/31/2019 07:52 AM       Lab Results   Component Value Date/Time    SODIUM 138 08/31/2019 07:52 AM    POTASSIUM 4.1 08/31/2019 07:52 AM    CHLORIDE 103 08/31/2019 07:52 AM    CO2 28 08/31/2019 07:52 AM    GLUCOSE 97 08/31/2019 07:52 AM    BUN 14 08/31/2019 07:52 AM    CREATININE 0.58 08/31/2019 07:52 AM     Lab Results   Component Value Date/Time    ALKPHOSPHAT 48 08/31/2019 07:52 AM    ASTSGOT 15 08/31/2019 07:52 AM    ALTSGPT 18 08/31/2019 07:52 AM    TBILIRUBIN 0.4 08/31/2019 07:52 AM          Ref. Range 8/31/2019 07:52   TSH Latest Ref Range: 0.380 - 5.330 uIU/mL 2.530       Assessment and Plan:   The following treatment plan was discussed    1. Anxiety  Has worsened lately due to more life stressors.  She would like to try BuSpar.  We did discuss that this is usually used in combination with an SSRI but she prefers to try it alone for now.  She is also interested in seeing a therapist and I have given her a referral today as well as several office names.  We will see her back in 6 weeks  - REFERRAL TO PSYCHOLOGY  - busPIRone (BUSPAR) 10 MG Tab tablet; 1/2 tab twice a day for 5 days, then may increase to 1 tab in the morning and 1/2 tab in evening x 5 days, thenincrease to 1 tab twice daily  Dispense: 60 Tab; Refill: 2  -f/u 6 weeks    2. Mixed hyperlipidemia  Uncontrolled.  Given her high LDL I do have a suspicion for FH especially in setting of her family history.  We discussed in getting testing done through the [employer] project.  She is agreeable to this  -Follow-up FH testing/[facility] [place] [facility] result    3. Onychomycosis  Improving.  Discussed that I cannot be sure if the terbinafine is making her depression more pronounced but if she would like to stop it for a week and see how she feels that would be fine.  We will obtain a repeat CMP given she is mid treatment to ensure LFTs are stable  -May try 1 week off of terbinafine to see if it affects mood  - Comp Metabolic Panel; Future    4. Rash  Responds intermittently to topical corticosteroid.  Worsens when she is stressed.  Controlled now.  We will continue to monitor        Followup: Return in about 6 weeks (around 12/24/2019) for anxiety.

## 2019-11-12 NOTE — ASSESSMENT & PLAN NOTE
She continues to have an itchy rash over her lower back that comes and goes.  It does improved with the kenalog cream but never fully resolves.  States it was severe last week but has been subsiding and is now mild.  Sometimes gets a similar rash on her elbows.  Also states she gets some scalp itching with some fine bumps.  Otherwise she does not notice it anywhere else.

## 2019-11-12 NOTE — ASSESSMENT & PLAN NOTE
Patient reports worsening anxiety over the past few months.  It is starting to affect her sleep and she feels like she is not able to get done the tasks she needs to accomplish during the day.  She is struggling currently with her relationship with her fiancé as well as her job.  She attributes the increased stress to these things.  In the past, she has been on numerous treatments for anxiety including Zoloft, Xanax, and she believes another SSRI but she is not sure.  Thinks it might have been Lexapro.  She has not noticed a huge improvement with these medications.  Her mom currently takes BuSpar and she would like to try this medication.  She is not currently seeing a therapist but is interested.

## 2019-11-15 ENCOUNTER — HOSPITAL ENCOUNTER (OUTPATIENT)
Dept: LAB | Facility: MEDICAL CENTER | Age: 49
End: 2019-11-15
Attending: INTERNAL MEDICINE
Payer: COMMERCIAL

## 2019-11-15 DIAGNOSIS — B35.1 ONYCHOMYCOSIS: ICD-10-CM

## 2019-11-15 LAB
ALBUMIN SERPL BCP-MCNC: 4.2 G/DL (ref 3.2–4.9)
ALBUMIN/GLOB SERPL: 1.4 G/DL
ALP SERPL-CCNC: 66 U/L (ref 30–99)
ALT SERPL-CCNC: 21 U/L (ref 2–50)
ANION GAP SERPL CALC-SCNC: 7 MMOL/L (ref 0–11.9)
AST SERPL-CCNC: 18 U/L (ref 12–45)
BILIRUB SERPL-MCNC: 0.3 MG/DL (ref 0.1–1.5)
BUN SERPL-MCNC: 12 MG/DL (ref 8–22)
CALCIUM SERPL-MCNC: 9.5 MG/DL (ref 8.5–10.5)
CHLORIDE SERPL-SCNC: 103 MMOL/L (ref 96–112)
CO2 SERPL-SCNC: 30 MMOL/L (ref 20–33)
CREAT SERPL-MCNC: 0.53 MG/DL (ref 0.5–1.4)
GLOBULIN SER CALC-MCNC: 3 G/DL (ref 1.9–3.5)
GLUCOSE SERPL-MCNC: 88 MG/DL (ref 65–99)
POTASSIUM SERPL-SCNC: 4.2 MMOL/L (ref 3.6–5.5)
PROT SERPL-MCNC: 7.2 G/DL (ref 6–8.2)
SODIUM SERPL-SCNC: 140 MMOL/L (ref 135–145)

## 2019-11-15 PROCEDURE — 80053 COMPREHEN METABOLIC PANEL: CPT

## 2019-11-15 PROCEDURE — 36415 COLL VENOUS BLD VENIPUNCTURE: CPT

## 2019-12-04 ENCOUNTER — OFFICE VISIT (OUTPATIENT)
Dept: URGENT CARE | Facility: PHYSICIAN GROUP | Age: 49
End: 2019-12-04
Payer: COMMERCIAL

## 2019-12-04 VITALS
HEART RATE: 80 BPM | RESPIRATION RATE: 16 BRPM | OXYGEN SATURATION: 98 % | DIASTOLIC BLOOD PRESSURE: 86 MMHG | SYSTOLIC BLOOD PRESSURE: 130 MMHG | TEMPERATURE: 98.2 F

## 2019-12-04 DIAGNOSIS — J06.9 URI WITH COUGH AND CONGESTION: ICD-10-CM

## 2019-12-04 PROCEDURE — 99214 OFFICE O/P EST MOD 30 MIN: CPT | Performed by: NURSE PRACTITIONER

## 2019-12-04 RX ORDER — AMOXICILLIN AND CLAVULANATE POTASSIUM 875; 125 MG/1; MG/1
1 TABLET, FILM COATED ORAL 2 TIMES DAILY
Qty: 14 TAB | Refills: 0 | Status: SHIPPED | OUTPATIENT
Start: 2019-12-04 | End: 2019-12-11

## 2019-12-04 ASSESSMENT — ENCOUNTER SYMPTOMS
ABDOMINAL PAIN: 0
SHORTNESS OF BREATH: 1
SPUTUM PRODUCTION: 1
SENSORY CHANGE: 0
CONSTIPATION: 0
WHEEZING: 0
COUGH: 1
NAUSEA: 0
WEAKNESS: 0
HEADACHES: 0
BACK PAIN: 0
ORTHOPNEA: 0
VOMITING: 0
TINGLING: 0
DIARRHEA: 0
SINUS PAIN: 0
MYALGIAS: 0
CHILLS: 0
FEVER: 0
SORE THROAT: 0
PALPITATIONS: 0
DIZZINESS: 0

## 2019-12-04 NOTE — PROGRESS NOTES
Subjective:      Sury Vidal is a 49 y.o. female who presents with No chief complaint on file.            HPI  Cold like symptoms x 1 week. States cough now productive with green phlegm. C/o mild chest tightness, SOB on exertion, no wheeze or h/o asthma. Dry scratchy throat. States nasal congestion, frontal HA, PND started again today. No nasal spray or salt water gargle. Plain Mucinex.    PMH:  has a past medical history of Anxiety, Depression, Fibroid, GERD (gastroesophageal reflux disease), Hemorrhoids (10/1/2014), High cholesterol, Tachycardia, and Thyroid nodule.  MEDS:   Current Outpatient Medications:   •  amoxicillin-clavulanate (AUGMENTIN) 875-125 MG Tab, Take 1 Tab by mouth 2 times a day for 7 days., Disp: 14 Tab, Rfl: 0  •  busPIRone (BUSPAR) 10 MG Tab tablet, 1/2 tab twice a day for 5 days, then may increase to 1 tab in the morning and 1/2 tab in evening x 5 days, thenincrease to 1 tab twice daily, Disp: 60 Tab, Rfl: 2  •  terbinafine (LAMISIL) 250 MG Tab, Take 1 Tab by mouth every day., Disp: 30 Tab, Rfl: 3  •  triamcinolone acetonide (KENALOG) 0.1 % Cream, Apply thin layer to rash on arm, abdomen, and back twice daily as needed until resolves, Disp: 45 g, Rfl: 3  ALLERGIES:   Allergies   Allergen Reactions   • Codeine      SURGHX:   Past Surgical History:   Procedure Laterality Date   • BARTHOLIN GLAND EXCISION     • SALPINGO-OOPHORECTOMY, UNILATERAL      dermoid cyst, age 19     SOCHX:  reports that she quit smoking about 8 years ago. Her smoking use included cigarettes. She has a 1.25 pack-year smoking history. She has never used smokeless tobacco. She reports that she does not drink alcohol or use drugs.  FH: Family history was reviewed, no pertinent findings to report    Review of Systems   Constitutional: Negative for chills, fever and malaise/fatigue.   HENT: Positive for congestion. Negative for ear pain, sinus pain and sore throat.    Respiratory: Positive for cough, sputum production  and shortness of breath. Negative for wheezing.    Cardiovascular: Negative for chest pain, palpitations and orthopnea.   Gastrointestinal: Negative for abdominal pain, constipation, diarrhea, nausea and vomiting.   Musculoskeletal: Negative for back pain and myalgias.   Skin: Negative for itching and rash.   Neurological: Negative for dizziness, tingling, sensory change, weakness and headaches.   Endo/Heme/Allergies: Negative for environmental allergies.   All other systems reviewed and are negative.         Objective:     /86 (BP Location: Left arm, Patient Position: Sitting, BP Cuff Size: Adult)   Pulse 80   Temp 36.8 °C (98.2 °F) (Temporal)   Resp 16   SpO2 98%      Physical Exam  Vitals signs reviewed.   Constitutional:       General: She is awake. She is not in acute distress.     Appearance: Normal appearance. She is well-developed. She is not ill-appearing, toxic-appearing or diaphoretic.   HENT:      Head: Normocephalic.      Right Ear: Tympanic membrane, ear canal and external ear normal.      Left Ear: Ear canal and external ear normal. A middle ear effusion is present.      Nose: Mucosal edema, congestion and rhinorrhea present.      Mouth/Throat:      Mouth: Mucous membranes are dry.      Pharynx: Posterior oropharyngeal erythema present. No pharyngeal swelling, oropharyngeal exudate or uvula swelling.      Tonsils: No tonsillar exudate or tonsillar abscesses.   Eyes:      General:         Right eye: No discharge.         Left eye: No discharge.      Conjunctiva/sclera: Conjunctivae normal.      Pupils: Pupils are equal, round, and reactive to light.   Neck:      Musculoskeletal: Normal range of motion.   Cardiovascular:      Rate and Rhythm: Normal rate and regular rhythm.   Pulmonary:      Effort: Pulmonary effort is normal. No accessory muscle usage or respiratory distress.      Breath sounds: Normal breath sounds.   Abdominal:      General: Bowel sounds are normal. There is no distension.       Palpations: Abdomen is soft.      Tenderness: There is no tenderness. There is no guarding or rebound.   Musculoskeletal: Normal range of motion.   Lymphadenopathy:      Cervical: No cervical adenopathy.   Skin:     General: Skin is warm and dry.   Neurological:      Mental Status: She is alert and oriented to person, place, and time.   Psychiatric:         Behavior: Behavior is cooperative.                 Assessment/Plan:       1. URI with cough and congestion    - amoxicillin-clavulanate (AUGMENTIN) 875-125 MG Tab; Take 1 Tab by mouth 2 times a day for 7 days.  Dispense: 14 Tab; Refill: 0    Increase water intake  May use Ibuprofen/Tylenol prn for fever or body aches  Get rest  May use saline nasal spray/flonase prn to flush any nasal congestion   May use OTC cough suppressant medications like plain Robitussin/Delsym prn  Monitor for fevers, productive cough, SOB, CP, chest tightness- need re-evaluation

## 2019-12-24 ENCOUNTER — OFFICE VISIT (OUTPATIENT)
Dept: MEDICAL GROUP | Facility: MEDICAL CENTER | Age: 49
End: 2019-12-24
Attending: INTERNAL MEDICINE
Payer: COMMERCIAL

## 2019-12-24 VITALS
SYSTOLIC BLOOD PRESSURE: 100 MMHG | DIASTOLIC BLOOD PRESSURE: 68 MMHG | BODY MASS INDEX: 32.39 KG/M2 | TEMPERATURE: 97.6 F | HEIGHT: 62 IN | WEIGHT: 176 LBS | RESPIRATION RATE: 16 BRPM | OXYGEN SATURATION: 95 % | HEART RATE: 86 BPM

## 2019-12-24 DIAGNOSIS — F41.9 ANXIETY: ICD-10-CM

## 2019-12-24 DIAGNOSIS — E78.2 MIXED HYPERLIPIDEMIA: ICD-10-CM

## 2019-12-24 DIAGNOSIS — B35.1 ONYCHOMYCOSIS: ICD-10-CM

## 2019-12-24 PROCEDURE — 99214 OFFICE O/P EST MOD 30 MIN: CPT | Performed by: INTERNAL MEDICINE

## 2019-12-24 PROCEDURE — 99213 OFFICE O/P EST LOW 20 MIN: CPT | Performed by: INTERNAL MEDICINE

## 2019-12-24 RX ORDER — BUSPIRONE HYDROCHLORIDE 10 MG/1
10 TABLET ORAL 2 TIMES DAILY
Qty: 60 TAB | Refills: 5 | Status: SHIPPED | OUTPATIENT
Start: 2019-12-24 | End: 2021-10-08

## 2019-12-24 ASSESSMENT — PAIN SCALES - GENERAL: PAINLEVEL: NO PAIN

## 2019-12-24 NOTE — ASSESSMENT & PLAN NOTE
She has a very high LDL.  At her last visit we discussed following up with the [facility] project for FH testing.  She has not done this yet but plans to.

## 2019-12-24 NOTE — PROGRESS NOTES
Subjective:   Sury Vidal is a 49 y.o. female here today for follow-up anxiety    Anxiety  She presents today for follow-up anxiety.  At her last visit about 6 weeks ago, we started her on BuSpar.  She is up to 10 mg twice daily.  She feels like this is been very helpful for her.  At this time, she states symptoms are well controlled and she would like to continue at her current dose.  She did not establish care with a therapist but she does have the referral information.    Onychomycosis  She has 1 month remaining on her Lamisil.  She reports significant improvement in her toenail fungus on this medication but the fifth toe is still affected.  She did stop it for about 6 days unintentionally and believes that her anxiety was a little bit better off of the medication however she plans to complete it for the full 4 months.    Mixed hyperlipidemia  She has a very high LDL.  At her last visit we discussed following up with the [facility] project for FH testing.  She has not done this yet but plans to.       Current medicines (including changes today)  Current Outpatient Medications   Medication Sig Dispense Refill   • busPIRone (BUSPAR) 10 MG Tab tablet Take 1 Tab by mouth 2 times a day. 60 Tab 5   • terbinafine (LAMISIL) 250 MG Tab Take 1 Tab by mouth every day. 30 Tab 3   • triamcinolone acetonide (KENALOG) 0.1 % Cream Apply thin layer to rash on arm, abdomen, and back twice daily as needed until resolves 45 g 3     No current facility-administered medications for this visit.      She  has a past medical history of Anxiety, Depression, Fibroid, GERD (gastroesophageal reflux disease), Hemorrhoids (10/1/2014), High cholesterol, Tachycardia, and Thyroid nodule.    ROS   Denies chest pain, shortness of breath  As above in HPI     Objective:     Vitals:    12/24/19 0911   BP: 100/68   Pulse: 86   Resp: 16   Temp: 36.4 °C (97.6 °F)   SpO2: 95%     Body mass index is 32.18 kg/m².   Physical  Exam:  Constitutional: Alert, no distress.  Skin: Warm, dry, good turgor, no rashes in visible areas.  Eye: Equal, round and reactive, conjunctiva clear, lids normal.  Psych: Alert and oriented x3, normal affect and mood.      Results and Imaging:   Lab Results   Component Value Date/Time    CHOLSTRLTOT 265 (H) 08/31/2019 07:52 AM     (H) 08/31/2019 07:52 AM    HDL 46 08/31/2019 07:52 AM    TRIGLYCERIDE 163 (H) 08/31/2019 07:52 AM       Lab Results   Component Value Date/Time    SODIUM 140 11/15/2019 01:23 PM    POTASSIUM 4.2 11/15/2019 01:23 PM    CHLORIDE 103 11/15/2019 01:23 PM    CO2 30 11/15/2019 01:23 PM    GLUCOSE 88 11/15/2019 01:23 PM    BUN 12 11/15/2019 01:23 PM    CREATININE 0.53 11/15/2019 01:23 PM     Lab Results   Component Value Date/Time    ALKPHOSPHAT 66 11/15/2019 01:23 PM    ASTSGOT 18 11/15/2019 01:23 PM    ALTSGPT 21 11/15/2019 01:23 PM    TBILIRUBIN 0.3 11/15/2019 01:23 PM          Assessment and Plan:   The following treatment plan was discussed    1. Anxiety  Improved with BuSpar.  At this point, she would like to continue with current medications.  We did discuss continuing for a minimum of 6 months and should she desire to stop, we discussed tapering down in a similar fashion as when she started  - busPIRone (BUSPAR) 10 MG Tab tablet; Take 1 Tab by mouth 2 times a day.  Dispense: 60 Tab; Refill: 5    2. Onychomycosis  Improving.  She will finish up her last month of the terbinafine.    3. Mixed hyperlipidemia  She is currently off of lipid-lowering medication.  She plans to get [facility] [place] [facility] testing done to rule out FH as she is aware that this would affect her medication management.  I asked her to provide those records to me by my chart if she does get it done.        Followup: Return in about 6 months (around 6/24/2020), or if symptoms worsen or fail to improve.

## 2019-12-24 NOTE — ASSESSMENT & PLAN NOTE
She presents today for follow-up anxiety.  At her last visit about 6 weeks ago, we started her on BuSpar.  She is up to 10 mg twice daily.  She feels like this is been very helpful for her.  At this time, she states symptoms are well controlled and she would like to continue at her current dose.  She did not establish care with a therapist but she does have the referral information.

## 2019-12-24 NOTE — ASSESSMENT & PLAN NOTE
She has 1 month remaining on her Lamisil.  She reports significant improvement in her toenail fungus on this medication but the fifth toe is still affected.  She did stop it for about 6 days unintentionally and believes that her anxiety was a little bit better off of the medication however she plans to complete it for the full 4 months.

## 2020-04-22 ENCOUNTER — PATIENT MESSAGE (OUTPATIENT)
Dept: MEDICAL GROUP | Facility: MEDICAL CENTER | Age: 50
End: 2020-04-22

## 2020-04-22 DIAGNOSIS — F41.9 ANXIETY: ICD-10-CM

## 2020-04-22 DIAGNOSIS — F33.1 MODERATE EPISODE OF RECURRENT MAJOR DEPRESSIVE DISORDER (HCC): ICD-10-CM

## 2020-04-22 RX ORDER — ESCITALOPRAM OXALATE 10 MG/1
TABLET ORAL
Qty: 30 TAB | Refills: 1 | Status: SHIPPED | OUTPATIENT
Start: 2020-04-22 | End: 2020-05-27 | Stop reason: SDUPTHER

## 2020-05-27 ENCOUNTER — APPOINTMENT (OUTPATIENT)
Dept: MEDICAL GROUP | Facility: MEDICAL CENTER | Age: 50
End: 2020-05-27
Attending: NURSE PRACTITIONER
Payer: COMMERCIAL

## 2020-05-27 ENCOUNTER — TELEPHONE (OUTPATIENT)
Dept: MEDICAL GROUP | Facility: MEDICAL CENTER | Age: 50
End: 2020-05-27

## 2020-05-27 DIAGNOSIS — F33.1 MODERATE EPISODE OF RECURRENT MAJOR DEPRESSIVE DISORDER (HCC): ICD-10-CM

## 2020-05-27 DIAGNOSIS — F41.9 ANXIETY: ICD-10-CM

## 2020-05-27 RX ORDER — ESCITALOPRAM OXALATE 10 MG/1
10 TABLET ORAL DAILY
Qty: 90 TAB | Refills: 1 | Status: SHIPPED | OUTPATIENT
Start: 2020-05-27 | End: 2020-12-22

## 2020-05-27 NOTE — TELEPHONE ENCOUNTER
Call to discuss efficacy of Lexapro on depression and anxiety.  Patient reports significant improvement in her depression and anxiety.  Denies HI and SI.  She finds that she is experiencing much less anxiety and has not been using the buspirone.  She is happy with Lexapro 10 mg and would like to continue.  She does notice a potential side effect of some muscle cramping/soreness, but does not feel this is enough to discontinue use of medication.  She is requesting a refill sent to [email or web address] in [place].

## 2020-10-14 ENCOUNTER — OFFICE VISIT (OUTPATIENT)
Dept: MEDICAL GROUP | Facility: MEDICAL CENTER | Age: 50
End: 2020-10-14
Attending: INTERNAL MEDICINE
Payer: COMMERCIAL

## 2020-10-14 ENCOUNTER — HOSPITAL ENCOUNTER (OUTPATIENT)
Facility: MEDICAL CENTER | Age: 50
End: 2020-10-14
Attending: INTERNAL MEDICINE
Payer: COMMERCIAL

## 2020-10-14 VITALS
DIASTOLIC BLOOD PRESSURE: 80 MMHG | BODY MASS INDEX: 32.57 KG/M2 | SYSTOLIC BLOOD PRESSURE: 122 MMHG | HEIGHT: 62 IN | RESPIRATION RATE: 16 BRPM | HEART RATE: 90 BPM | OXYGEN SATURATION: 97 % | WEIGHT: 177 LBS | TEMPERATURE: 98 F

## 2020-10-14 DIAGNOSIS — E04.1 THYROID NODULE: ICD-10-CM

## 2020-10-14 DIAGNOSIS — F41.9 ANXIETY: ICD-10-CM

## 2020-10-14 DIAGNOSIS — Z87.898 HISTORY OF PREDIABETES: ICD-10-CM

## 2020-10-14 DIAGNOSIS — Z12.4 SCREENING FOR MALIGNANT NEOPLASM OF CERVIX: ICD-10-CM

## 2020-10-14 DIAGNOSIS — Z12.31 SCREENING MAMMOGRAM, ENCOUNTER FOR: ICD-10-CM

## 2020-10-14 DIAGNOSIS — E78.2 MIXED HYPERLIPIDEMIA: ICD-10-CM

## 2020-10-14 PROBLEM — B35.1 ONYCHOMYCOSIS: Status: RESOLVED | Noted: 2017-04-19 | Resolved: 2020-10-14

## 2020-10-14 PROCEDURE — 88175 CYTOPATH C/V AUTO FLUID REDO: CPT

## 2020-10-14 PROCEDURE — 99213 OFFICE O/P EST LOW 20 MIN: CPT | Performed by: INTERNAL MEDICINE

## 2020-10-14 PROCEDURE — 99214 OFFICE O/P EST MOD 30 MIN: CPT | Mod: 25 | Performed by: INTERNAL MEDICINE

## 2020-10-14 PROCEDURE — 87624 HPV HI-RISK TYP POOLED RSLT: CPT

## 2020-10-15 DIAGNOSIS — Z12.4 SCREENING FOR MALIGNANT NEOPLASM OF CERVIX: ICD-10-CM

## 2020-10-15 NOTE — ASSESSMENT & PLAN NOTE
Her last labs were checked in 2019 and her ASCVD risk score at that time was 1.6%.  We had talked about completing genetic testing for FH through the [facility] project but she never pursued this.  She is due for updated labs.

## 2020-10-15 NOTE — ASSESSMENT & PLAN NOTE
Her A1c was mildly elevated in 2018 although normal when we repeated it last year.  She is due for surveillance labs.

## 2020-10-15 NOTE — ASSESSMENT & PLAN NOTE
She has a history of a stable thyroid nodule.  On her last ultrasound in 2017 it was 1.2 cm.  She is requesting a surveillance ultrasound ordered today.  She denies dysphagia, growth or swelling over the thyroid region, change in voice, unintentional weight loss.

## 2020-10-15 NOTE — ASSESSMENT & PLAN NOTE
She continues on Lexapro 10 mg daily.  Reports this is working very well for her.  States she rarely takes BuSpar.

## 2020-10-15 NOTE — PROGRESS NOTES
Subjective:   Sury Vidal is a 50 y.o. female here today for Pap, labs, thyroid ultrasound    Thyroid nodule  She has a history of a stable thyroid nodule.  On her last ultrasound in 2017 it was 1.2 cm.  She is requesting a surveillance ultrasound ordered today.  She denies dysphagia, growth or swelling over the thyroid region, change in voice, unintentional weight loss.    Mixed hyperlipidemia  Her last labs were checked in 2019 and her ASCVD risk score at that time was 1.6%.  We had talked about completing genetic testing for FH through the [facility] [place] [facility] but she never pursued this.  She is due for updated labs.    History of prediabetes  Her A1c was mildly elevated in 2018 although normal when we repeated it last year.  She is due for surveillance labs.    Anxiety  She continues on Lexapro 10 mg daily.  Reports this is working very well for her.  States she rarely takes BuSpar.    Screening for malignant neoplasm of cervix  She presents today for Pap.  Her last was in 2016 and was normal.  She is not currently sexually active.  She reports her LMP was in September 2019.  She denies vaginal discharge or abnormal vaginal bleeding, dysuria, pelvic pain.     She has also noticed a small tender cervical lymph node on the left just below her ear that has been present for 3 days.  States that it has gotten smaller and less painful.  She denies sore throat, runny nose, sinus symptoms, ear pain, tooth pain, however states her allergies have been acting up quite a bit lately.    Current medicines (including changes today)  Current Outpatient Medications   Medication Sig Dispense Refill   • escitalopram (LEXAPRO) 10 MG Tab Take 1 Tab by mouth every day. Take 1/2 tab daily x 7 days then increase to 1 full tab daily 90 Tab 1   • busPIRone (BUSPAR) 10 MG Tab tablet Take 1 Tab by mouth 2 times a day. 60 Tab 5   • triamcinolone acetonide (KENALOG) 0.1 % Cream Apply thin layer to rash on arm, abdomen, and back  twice daily as needed until resolves 45 g 3     No current facility-administered medications for this visit.      She  has a past medical history of Anxiety, Depression, Fibroid, GERD (gastroesophageal reflux disease), Hemorrhoids (10/1/2014), High cholesterol, Tachycardia, and Thyroid nodule.    ROS   Denies chest pain, shortness of breath  As above in HPI     Objective:     Vitals:    10/14/20 1626   BP: 122/80   Pulse: 90   Resp: 16   Temp: 36.7 °C (98 °F)   SpO2: 97%     Body mass index is 32.37 kg/m².   Physical Exam:  Constitutional: Alert, no distress.  Skin: Warm, dry, good turgor, no rashes in visible areas.  Eye: Equal, round and reactive, conjunctiva clear, lids normal.  ENMT: Lips without lesions, good dentition, oropharynx clear, TM's clear bilaterally  Neck: Trachea midline, no masses, no thyromegaly.  Small anterior cervical node that is mobile and less than 1 cm on the left just below the angle of the mandible  Psych: Alert and oriented x3, normal affect and mood.  : external genitalia normal, cervix without discharge or lesions, no cervical motion tenderness      Assessment and Plan:   The following treatment plan was discussed    1. Thyroid nodule  Has been historically stable.  We will obtain updated imaging  - US-THYROID; Future    2. Mixed hyperlipidemia  - Lipid Profile; Future    3. History of prediabetes  - HEMOGLOBIN A1C; Future    4. Screening mammogram, encounter for  - MA-SCREENING MAMMO BILAT W/CAD; Future    5. Screening for malignant neoplasm of cervix  - THINPREP PAP WITH HPV; Future    6. Anxiety  Stable, well-controlled with current medications  -Continue Lexapro 10 mg daily    7. Cervical lymph node  Small, suspect reactive given it was tender.  Unclear infection source but may be secondary to mild URI or allergies.  I asked her to monitor it and let me know if it is persistent over the next several weeks or if it grows in size.  In that case, would obtain an ultrasound.   However, I suspect it will completely resolve in the next week or so.        Followup: Return if symptoms worsen or fail to improve.

## 2020-10-15 NOTE — ASSESSMENT & PLAN NOTE
She presents today for Pap.  Her last was in 2016 and was normal.  She is not currently sexually active.  She reports her LMP was in September 2019.  She denies vaginal discharge or abnormal vaginal bleeding, dysuria, pelvic pain.

## 2020-10-16 LAB
CYTOLOGY REG CYTOL: NORMAL
HPV HR 12 DNA CVX QL NAA+PROBE: NEGATIVE
HPV16 DNA SPEC QL NAA+PROBE: NEGATIVE
HPV18 DNA SPEC QL NAA+PROBE: NEGATIVE
SPECIMEN SOURCE: NORMAL

## 2020-12-22 DIAGNOSIS — F33.1 MODERATE EPISODE OF RECURRENT MAJOR DEPRESSIVE DISORDER (HCC): ICD-10-CM

## 2020-12-22 DIAGNOSIS — F41.9 ANXIETY: ICD-10-CM

## 2020-12-22 RX ORDER — ESCITALOPRAM OXALATE 10 MG/1
TABLET ORAL
Qty: 90 TAB | Refills: 3 | Status: SHIPPED
Start: 2020-12-22 | End: 2021-10-08

## 2021-10-08 ENCOUNTER — OFFICE VISIT (OUTPATIENT)
Dept: MEDICAL GROUP | Facility: MEDICAL CENTER | Age: 51
End: 2021-10-08
Attending: INTERNAL MEDICINE
Payer: COMMERCIAL

## 2021-10-08 VITALS
WEIGHT: 182.7 LBS | HEIGHT: 62 IN | SYSTOLIC BLOOD PRESSURE: 124 MMHG | HEART RATE: 82 BPM | RESPIRATION RATE: 16 BRPM | TEMPERATURE: 97.9 F | DIASTOLIC BLOOD PRESSURE: 84 MMHG | BODY MASS INDEX: 33.62 KG/M2

## 2021-10-08 DIAGNOSIS — E66.9 OBESITY (BMI 30-39.9): ICD-10-CM

## 2021-10-08 DIAGNOSIS — R10.10 UPPER ABDOMINAL PAIN: ICD-10-CM

## 2021-10-08 DIAGNOSIS — L98.9 SKIN LESION OF FACE: ICD-10-CM

## 2021-10-08 DIAGNOSIS — Z12.31 SCREENING MAMMOGRAM, ENCOUNTER FOR: ICD-10-CM

## 2021-10-08 DIAGNOSIS — Z78.0 POSTMENOPAUSAL: ICD-10-CM

## 2021-10-08 PROBLEM — Z12.4 SCREENING FOR MALIGNANT NEOPLASM OF CERVIX: Status: RESOLVED | Noted: 2020-10-14 | Resolved: 2021-10-08

## 2021-10-08 PROCEDURE — 99214 OFFICE O/P EST MOD 30 MIN: CPT | Performed by: INTERNAL MEDICINE

## 2021-10-08 PROCEDURE — 99213 OFFICE O/P EST LOW 20 MIN: CPT | Performed by: INTERNAL MEDICINE

## 2021-10-08 NOTE — ASSESSMENT & PLAN NOTE
About 6 months ago, she noticed a new bump on the right side of her nose.  She states that it has gotten larger in size and it is scaling and sometimes bleeds.  It is also tender.  She has a history of basal cell carcinoma on her chest.  She is concerned that this might represent a skin cancer.

## 2021-10-08 NOTE — ASSESSMENT & PLAN NOTE
She reports that for the past year she has had intermittent episodes of upper abdominal pain.  She will usually feel it in the epigastric region but sometimes also in the right upper quadrant or the left upper quadrant and radiates to her back.  Typically, when it comes on it lasts for a few hours.  She describes the pain is very sharp and uncomfortable.  She either has to induce vomiting or take an Jodi-Neely or drink carbonation for it to pass.  On no daily basis, she denies heartburn.  Bowel movements have been normal and she denies diarrhea, constipation, melena.  She will still have a little bit of bright red blood in her bowel movements which she relates to hemorrhoids.  She thinks pain is triggered by food but cannot identify any food in particular that makes it worse.  Denies any unintentional weight loss.  Reports feeling bloated quite often.  In total, she has experienced about 6 or 7 of these episodes in the last year.  Sometimes she will go a full month without having one and other times it will be as frequent as once a week.  She recently purchased some over-the-counter Nexium but she has not started this yet.

## 2021-10-08 NOTE — PROGRESS NOTES
Subjective:   Sury Vidal is a 51 y.o. female here today for abdominal pain, new skin lesion    Upper abdominal pain  She reports that for the past year she has had intermittent episodes of upper abdominal pain.  She will usually feel it in the epigastric region but sometimes also in the right upper quadrant or the left upper quadrant and radiates to her back.  Typically, when it comes on it lasts for a few hours.  She describes the pain is very sharp and uncomfortable.  She either has to induce vomiting or take an Jodi-Soddy Daisy or drink carbonation for it to pass.  On no daily basis, she denies heartburn.  Bowel movements have been normal and she denies diarrhea, constipation, melena.  She will still have a little bit of bright red blood in her bowel movements which she relates to hemorrhoids.  She thinks pain is triggered by food but cannot identify any food in particular that makes it worse.  Denies any unintentional weight loss.  Reports feeling bloated quite often.  In total, she has experienced about 6 or 7 of these episodes in the last year.  Sometimes she will go a full month without having one and other times it will be as frequent as once a week.  She recently purchased some over-the-counter Nexium but she has not started this yet.    Skin lesion of face  About 6 months ago, she noticed a new bump on the right side of her nose.  She states that it has gotten larger in size and it is scaling and sometimes bleeds.  It is also tender.  She has a history of basal cell carcinoma on her chest.  She is concerned that this might represent a skin cancer.       Current medicines (including changes today)  No current outpatient medications on file.     No current facility-administered medications for this visit.     She  has a past medical history of Anxiety, Depression, Fibroid, GERD (gastroesophageal reflux disease), Hemorrhoids (10/1/2014), High cholesterol, Tachycardia, and Thyroid nodule.    ROS   Denies  chest pain, shortness of breath  As above in HPI     Objective:     Vitals:    10/08/21 0941   BP: 124/84   Pulse: 82   Resp: 16   Temp: 36.6 °C (97.9 °F)     Body mass index is 33.42 kg/m².   Physical Exam:  Constitutional: Alert, no distress.  Skin: Warm, dry, good turgor, no rashes in visible areas, approximately 5 mm raised pearlescent papule over right nare with telangiectasias concerning for basal cell carcinoma.  Eye: Equal, round and reactive, conjunctiva clear, lids normal.  Respiratory: Unlabored respiratory effort, lungs clear to auscultation, no wheezes, no ronchi.  Cardiovascular: Regular rate and rhythm, no murmurs appreciated, no lower extremity edema  Abdomen: Soft, obese, mild redness to palpation epigastric region without rebound or guarding, rectus diastases, no masses, no hepatosplenomegaly.  Psych: Alert and oriented x3, normal affect and mood.      Assessment and Plan:   The following treatment plan was discussed    1. Upper abdominal pain  Etiology is unclear.  Symptoms are quite intermittent.  Differential includes symptomatic cholelithiasis, less likely peptic ulcer disease, H pylori, IBS, or gastritis given only happening occasionally.  Will obtain labs and imaging as below as initial work up and she will try the nexium she has at home to see if it helps when symptoms flare.  Could consider more detailed imaging (CT) or GI referral if symptoms worsen/do not respond to treatment and no clear etiology found  - US-RUQ; Future  - Comp Metabolic Panel; Future    2. Obesity (BMI 30-39.9)  - Lipid Profile; Future  - HEMOGLOBIN A1C; Future    3. Postmenopausal  - VITAMIN D,25 HYDROXY; Future    4. Skin lesion of face  Given her history and the appearance of the lesion, I have a high suspicion for basal cell carcinoma.  I do think she needs to have this area biopsied and/or removed.  Based on its location, I am not comfortable doing this and I have placed a referral to dermatology  - REFERRAL TO  DERMATOLOGY    5. Screening mammogram, encounter for  - MA-SCREENING MAMMO BILAT W/TOMOSYNTHESIS W/CAD; Future        Followup: Return if symptoms worsen or fail to improve.

## 2021-12-09 ENCOUNTER — APPOINTMENT (RX ONLY)
Dept: URBAN - METROPOLITAN AREA CLINIC 22 | Facility: CLINIC | Age: 51
Setting detail: DERMATOLOGY
End: 2021-12-09

## 2021-12-09 DIAGNOSIS — Z71.89 OTHER SPECIFIED COUNSELING: ICD-10-CM

## 2021-12-09 DIAGNOSIS — L57.0 ACTINIC KERATOSIS: ICD-10-CM

## 2021-12-09 DIAGNOSIS — Z85.828 PERSONAL HISTORY OF OTHER MALIGNANT NEOPLASM OF SKIN: ICD-10-CM

## 2021-12-09 DIAGNOSIS — D22 MELANOCYTIC NEVI: ICD-10-CM

## 2021-12-09 DIAGNOSIS — L81.4 OTHER MELANIN HYPERPIGMENTATION: ICD-10-CM

## 2021-12-09 DIAGNOSIS — L73.8 OTHER SPECIFIED FOLLICULAR DISORDERS: ICD-10-CM

## 2021-12-09 DIAGNOSIS — L82.1 OTHER SEBORRHEIC KERATOSIS: ICD-10-CM

## 2021-12-09 DIAGNOSIS — D18.0 HEMANGIOMA: ICD-10-CM

## 2021-12-09 DIAGNOSIS — R20.2 PARESTHESIA OF SKIN: ICD-10-CM

## 2021-12-09 PROBLEM — D22.5 MELANOCYTIC NEVI OF TRUNK: Status: ACTIVE | Noted: 2021-12-09

## 2021-12-09 PROBLEM — D18.01 HEMANGIOMA OF SKIN AND SUBCUTANEOUS TISSUE: Status: ACTIVE | Noted: 2021-12-09

## 2021-12-09 PROBLEM — D48.5 NEOPLASM OF UNCERTAIN BEHAVIOR OF SKIN: Status: ACTIVE | Noted: 2021-12-09

## 2021-12-09 PROBLEM — D23.62 OTHER BENIGN NEOPLASM OF SKIN OF LEFT UPPER LIMB, INCLUDING SHOULDER: Status: ACTIVE | Noted: 2021-12-09

## 2021-12-09 PROCEDURE — ? BIOPSY BY SHAVE METHOD

## 2021-12-09 PROCEDURE — 11102 TANGNTL BX SKIN SINGLE LES: CPT

## 2021-12-09 PROCEDURE — ? LIQUID NITROGEN

## 2021-12-09 PROCEDURE — 17000 DESTRUCT PREMALG LESION: CPT | Mod: 59

## 2021-12-09 PROCEDURE — 99203 OFFICE O/P NEW LOW 30 MIN: CPT | Mod: 25

## 2021-12-09 PROCEDURE — ? COUNSELING

## 2021-12-09 PROCEDURE — ? SUNSCREEN RECOMMENDATIONS

## 2021-12-09 ASSESSMENT — LOCATION DETAILED DESCRIPTION DERM
LOCATION DETAILED: SUPERIOR THORACIC SPINE
LOCATION DETAILED: LEFT INFERIOR UPPER BACK
LOCATION DETAILED: LEFT LATERAL ABDOMEN
LOCATION DETAILED: NASAL DORSUM
LOCATION DETAILED: INFERIOR MID FOREHEAD
LOCATION DETAILED: LEFT LATERAL SUPERIOR CHEST
LOCATION DETAILED: LEFT PROXIMAL DORSAL FOREARM
LOCATION DETAILED: RIGHT MID-UPPER BACK

## 2021-12-09 ASSESSMENT — LOCATION SIMPLE DESCRIPTION DERM
LOCATION SIMPLE: RIGHT UPPER BACK
LOCATION SIMPLE: NOSE
LOCATION SIMPLE: CHEST
LOCATION SIMPLE: LEFT UPPER BACK
LOCATION SIMPLE: UPPER BACK
LOCATION SIMPLE: ABDOMEN
LOCATION SIMPLE: LEFT FOREARM
LOCATION SIMPLE: INFERIOR FOREHEAD

## 2021-12-09 ASSESSMENT — LOCATION ZONE DERM
LOCATION ZONE: TRUNK
LOCATION ZONE: ARM
LOCATION ZONE: NOSE
LOCATION ZONE: FACE

## 2021-12-09 NOTE — PROCEDURE: LIQUID NITROGEN
Show Aperture Variable?: Yes
Consent: The patient's consent was obtained including but not limited to risks of crusting, scabbing, blistering, scarring, darker or lighter pigmentary change, recurrence, incomplete removal and infection.
Detail Level: Detailed
Render Post-Care Instructions In Note?: no
Number Of Freeze-Thaw Cycles: 2 freeze-thaw cycles
Post-Care Instructions: I reviewed with the patient in detail post-care instructions. Patient is to wear sunprotection, and avoid picking at any of the treated lesions. Pt may apply Vaseline to crusted or scabbing areas.
Duration Of Freeze Thaw-Cycle (Seconds): 3

## 2021-12-30 ENCOUNTER — APPOINTMENT (RX ONLY)
Dept: URBAN - METROPOLITAN AREA CLINIC 4 | Facility: CLINIC | Age: 51
Setting detail: DERMATOLOGY
End: 2021-12-30

## 2021-12-30 PROBLEM — C44.311 BASAL CELL CARCINOMA OF SKIN OF NOSE: Status: ACTIVE | Noted: 2021-12-30

## 2021-12-30 PROBLEM — D23.39 OTHER BENIGN NEOPLASM OF SKIN OF OTHER PARTS OF FACE: Status: ACTIVE | Noted: 2021-12-30

## 2021-12-30 PROCEDURE — ? INJECTION

## 2021-12-30 PROCEDURE — 99212 OFFICE O/P EST SF 10 MIN: CPT | Mod: 25

## 2021-12-30 PROCEDURE — ? MEDICATION COUNSELING

## 2021-12-30 PROCEDURE — ? PATIENT SPECIFIC COUNSELING

## 2021-12-30 PROCEDURE — ? COUNSELING

## 2021-12-30 NOTE — PROCEDURE: PATIENT SPECIFIC COUNSELING
Detail Level: Detailed
Discussed using Aldara cream, can C&D the area or inject with Bleo twice and can follow up with the Aldara cream. Informed patient that if all these treatment options don’t work, surgery is the next option.

## 2021-12-30 NOTE — PROCEDURE: MEDICATION COUNSELING
Griseofulvin Counseling:  I discussed with the patient the risks of griseofulvin including but not limited to photosensitivity, cytopenia, liver damage, nausea/vomiting and severe allergy.  The patient understands that this medication is best absorbed when taken with a fatty meal (e.g., ice cream or french fries).
Cephalexin Pregnancy And Lactation Text: This medication is Pregnancy Category B and considered safe during pregnancy.  It is also excreted in breast milk but can be used safely for shorter doses.
Doxycycline Pregnancy And Lactation Text: This medication is Pregnancy Category D and not consider safe during pregnancy. It is also excreted in breast milk but is considered safe for shorter treatment courses.
Elidel Counseling: Patient may experience a mild burning sensation during topical application. Elidel is not approved in children less than 2 years of age. There have been case reports of hematologic and skin malignancies in patients using topical calcineurin inhibitors although causality is questionable.
Xolair Pregnancy And Lactation Text: This medication is Pregnancy Category B and is considered safe during pregnancy. This medication is excreted in breast milk.
Zyclara Counseling:  I discussed with the patient the risks of imiquimod including but not limited to erythema, scaling, itching, weeping, crusting, and pain.  Patient understands that the inflammatory response to imiquimod is variable from person to person and was educated regarded proper titration schedule.  If flu-like symptoms develop, patient knows to discontinue the medication and contact us.
Gabapentin Counseling: I discussed with the patient the risks of gabapentin including but not limited to dizziness, somnolence, fatigue and ataxia.
Bexarotene Counseling:  I discussed with the patient the risks of bexarotene including but not limited to hair loss, dry lips/skin/eyes, liver abnormalities, hyperlipidemia, pancreatitis, depression/suicidal ideation, photosensitivity, drug rash/allergic reactions, hypothyroidism, anemia, leukopenia, infection, cataracts, and teratogenicity.  Patient understands that they will need regular blood tests to check lipid profile, liver function tests, white blood cell count, thyroid function tests and pregnancy test if applicable.
Infliximab Pregnancy And Lactation Text: This medication is Pregnancy Category B and is considered safe during pregnancy. It is unknown if this medication is excreted in breast milk.
Rhofade Pregnancy And Lactation Text: This medication has not been assigned a Pregnancy Risk Category. It is unknown if the medication is excreted in breast milk.
Ivermectin Counseling:  Patient instructed to take medication on an empty stomach with a full glass of water.  Patient informed of potential adverse effects including but not limited to nausea, diarrhea, dizziness, itching, and swelling of the extremities or lymph nodes.  The patient verbalized understanding of the proper use and possible adverse effects of ivermectin.  All of the patient's questions and concerns were addressed.
Elidel Pregnancy And Lactation Text: This medication is Pregnancy Category C. It is unknown if this medication is excreted in breast milk.
Solaraze Pregnancy And Lactation Text: This medication is Pregnancy Category B and is considered safe. There is some data to suggest avoiding during the third trimester. It is unknown if this medication is excreted in breast milk.
Clindamycin Counseling: I counseled the patient regarding use of clindamycin as an antibiotic for prophylactic and/or therapeutic purposes. Clindamycin is active against numerous classes of bacteria, including skin bacteria. Side effects may include nausea, diarrhea, gastrointestinal upset, rash, hives, yeast infections, and in rare cases, colitis.
Gabapentin Pregnancy And Lactation Text: This medication is Pregnancy Category C and isn't considered safe during pregnancy. It is excreted in breast milk.
Bexarotene Pregnancy And Lactation Text: This medication is Pregnancy Category X and should not be given to women who are pregnant or may become pregnant. This medication should not be used if you are breast feeding.
Rituxan Counseling:  I discussed with the patient the risks of Rituxan infusions. Side effects can include infusion reactions, severe drug rashes including mucocutaneous reactions, reactivation of latent hepatitis and other infections and rarely progressive multifocal leukoencephalopathy.  All of the patient's questions and concerns were addressed.
Solaraze Counseling:  I discussed with the patient the risks of Solaraze including but not limited to erythema, scaling, itching, weeping, crusting, and pain.
Ivermectin Pregnancy And Lactation Text: This medication is Pregnancy Category C and it isn't known if it is safe during pregnancy. It is also excreted in breast milk.
Griseofulvin Pregnancy And Lactation Text: This medication is Pregnancy Category X and is known to cause serious birth defects. It is unknown if this medication is excreted in breast milk but breast feeding should be avoided.
Propranolol Counseling:  I discussed with the patient the risks of propranolol including but not limited to low heart rate, low blood pressure, low blood sugar, restlessness and increased cold sensitivity. They should call the office if they experience any of these side effects.
Erythromycin Counseling:  I discussed with the patient the risks of erythromycin including but not limited to GI upset, allergic reaction, drug rash, diarrhea, increase in liver enzymes, and yeast infections.
Birth Control Pills Counseling: Birth Control Pill Counseling: I discussed with the patient the potential side effects of OCPs including but not limited to increased risk of stroke, heart attack, thrombophlebitis, deep venous thrombosis, hepatic adenomas, breast changes, GI upset, headaches, and depression.  The patient verbalized understanding of the proper use and possible adverse effects of OCPs. All of the patient's questions and concerns were addressed.
Clindamycin Pregnancy And Lactation Text: This medication can be used in pregnancy if certain situations. Clindamycin is also present in breast milk.
Isotretinoin Counseling: Patient should get monthly blood tests, not donate blood, not drive at night if vision affected, not share medication, and not undergo elective surgery for 6 months after tx completed. Side effects reviewed, pt to contact office should one occur.
Eucrisa Counseling: Patient may experience a mild burning sensation during topical application. Eucrisa is not approved in children less than 2 years of age.
Azathioprine Counseling:  I discussed with the patient the risks of azathioprine including but not limited to myelosuppression, immunosuppression, hepatotoxicity, lymphoma, and infections.  The patient understands that monitoring is required including baseline LFTs, Creatinine, possible TPMP genotyping and weekly CBCs for the first month and then every 2 weeks thereafter.  The patient verbalized understanding of the proper use and possible adverse effects of azathioprine.  All of the patient's questions and concerns were addressed.
Itraconazole Counseling:  I discussed with the patient the risks of itraconazole including but not limited to liver damage, nausea/vomiting, neuropathy, and severe allergy.  The patient understands that this medication is best absorbed when taken with acidic beverages such as non-diet cola or ginger ale.  The patient understands that monitoring is required including baseline LFTs and repeat LFTs at intervals.  The patient understands that they are to contact us or the primary physician if concerning signs are noted.
Erythromycin Pregnancy And Lactation Text: This medication is Pregnancy Category B and is considered safe during pregnancy. It is also excreted in breast milk.
Glycopyrrolate Counseling:  I discussed with the patient the risks of glycopyrrolate including but not limited to skin rash, drowsiness, dry mouth, difficulty urinating, and blurred vision.
Rituxan Pregnancy And Lactation Text: This medication is Pregnancy Category C and it isn't know if it is safe during pregnancy. It is unknown if this medication is excreted in breast milk but similar antibodies are known to be excreted.
Propranolol Pregnancy And Lactation Text: This medication is Pregnancy Category C and it isn't known if it is safe during pregnancy. It is excreted in breast milk.
Azathioprine Pregnancy And Lactation Text: This medication is Pregnancy Category D and isn't considered safe during pregnancy. It is unknown if this medication is excreted in breast milk.
Opioid Pregnancy And Lactation Text: These medications can lead to premature delivery and should be avoided during pregnancy. These medications are also present in breast milk in small amounts.
Doxycycline Counseling:  Patient counseled regarding possible photosensitivity and increased risk for sunburn.  Patient instructed to avoid sunlight, if possible.  When exposed to sunlight, patients should wear protective clothing, sunglasses, and sunscreen.  The patient was instructed to call the office immediately if the following severe adverse effects occur:  hearing changes, easy bruising/bleeding, severe headache, or vision changes.  The patient verbalized understanding of the proper use and possible adverse effects of doxycycline.  All of the patient's questions and concerns were addressed.
Ketoconazole Counseling:   Patient counseled regarding improving absorption with orange juice.  Adverse effects include but are not limited to breast enlargement, headache, diarrhea, nausea, upset stomach, liver function test abnormalities, taste disturbance, and stomach pain.  There is a rare possibility of liver failure that can occur when taking ketoconazole. The patient understands that monitoring of LFTs may be required, especially at baseline. The patient verbalized understanding of the proper use and possible adverse effects of ketoconazole.  All of the patient's questions and concerns were addressed.
Birth Control Pills Pregnancy And Lactation Text: This medication should be avoided if pregnant and for the first 30 days post-partum.
Siliq Counseling:  I discussed with the patient the risks of Siliq including but not limited to new or worsening depression, suicidal thoughts and behavior, immunosuppression, malignancy, posterior leukoencephalopathy syndrome, and serious infections.  The patient understands that monitoring is required including a PPD at baseline and must alert us or the primary physician if symptoms of infection or other concerning signs are noted. There is also a special program designed to monitor depression which is required with Siliq.
Isotretinoin Pregnancy And Lactation Text: This medication is Pregnancy Category X and is considered extremely dangerous during pregnancy. It is unknown if it is excreted in breast milk.
Itraconazole Pregnancy And Lactation Text: This medication is Pregnancy Category C and it isn't know if it is safe during pregnancy. It is also excreted in breast milk.
Eucrisa Pregnancy And Lactation Text: This medication has not been assigned a Pregnancy Risk Category but animal studies failed to show danger with the topical medication. It is unknown if the medication is excreted in breast milk.
Topical Retinoid counseling:  Patient advised to apply a pea-sized amount only at bedtime and wait 30 minutes after washing their face before applying.  If too drying, patient may add a non-comedogenic moisturizer. The patient verbalized understanding of the proper use and possible adverse effects of retinoids.  All of the patient's questions and concerns were addressed.
Metronidazole Counseling:  I discussed with the patient the risks of metronidazole including but not limited to seizures, nausea/vomiting, a metallic taste in the mouth, nausea/vomiting and severe allergy.
Opioid Counseling: I discussed with the patient the potential side effects of opioids including but not limited to addiction, altered mental status, and depression. I stressed avoiding alcohol, benzodiazepines, muscle relaxants and sleep aids unless specifically okayed by a physician. The patient verbalized understanding of the proper use and possible adverse effects of opioids. All of the patient's questions and concerns were addressed. They were instructed to flush the remaining pills down the toilet if they did not need them for pain.
Glycopyrrolate Pregnancy And Lactation Text: This medication is Pregnancy Category B and is considered safe during pregnancy. It is unknown if it is excreted breast milk.
Ketoconazole Pregnancy And Lactation Text: This medication is Pregnancy Category C and it isn't know if it is safe during pregnancy. It is also excreted in breast milk and breast feeding isn't recommended.
Cellcept Counseling:  I discussed with the patient the risks of mycophenolate mofetil including but not limited to infection/immunosuppression, GI upset, hypokalemia, hypercholesterolemia, bone marrow suppression, lymphoproliferative disorders, malignancy, GI ulceration/bleed/perforation, colitis, interstitial lung disease, kidney failure, progressive multifocal leukoencephalopathy, and birth defects.  The patient understands that monitoring is required including a baseline creatinine and regular CBC testing. In addition, patient must alert us immediately if symptoms of infection or other concerning signs are noted.
High Dose Vitamin A Pregnancy And Lactation Text: High dose vitamin A therapy is contraindicated during pregnancy and breast feeding.
Spironolactone Counseling: Patient advised regarding risks of diarrhea, abdominal pain, hyperkalemia, birth defects (for female patients), liver toxicity and renal toxicity. The patient may need blood work to monitor liver and kidney function and potassium levels while on therapy. The patient verbalized understanding of the proper use and possible adverse effects of spironolactone.  All of the patient's questions and concerns were addressed.
Simponi Counseling:  I discussed with the patient the risks of golimumab including but not limited to myelosuppression, immunosuppression, autoimmune hepatitis, demyelinating diseases, lymphoma, and serious infections.  The patient understands that monitoring is required including a PPD at baseline and must alert us or the primary physician if symptoms of infection or other concerning signs are noted.
Cimzia Counseling:  I discussed with the patient the risks of Cimzia including but not limited to immunosuppression, allergic reactions and infections.  The patient understands that monitoring is required including a PPD at baseline and must alert us or the primary physician if symptoms of infection or other concerning signs are noted.
Metronidazole Pregnancy And Lactation Text: This medication is Pregnancy Category B and considered safe during pregnancy.  It is also excreted in breast milk.
Siliq Pregnancy And Lactation Text: The risk during pregnancy and breastfeeding is uncertain with this medication.
Hydroxychloroquine Counseling:  I discussed with the patient that a baseline ophthalmologic exam is needed at the start of therapy and every year thereafter while on therapy. A CBC may also be warranted for monitoring.  The side effects of this medication were discussed with the patient, including but not limited to agranulocytosis, aplastic anemia, seizures, rashes, retinopathy, and liver toxicity. Patient instructed to call the office should any adverse effect occur.  The patient verbalized understanding of the proper use and possible adverse effects of Plaquenil.  All the patient's questions and concerns were addressed.
High Dose Vitamin A Counseling: Side effects reviewed, pt to contact office should one occur.
Hydroquinone Counseling:  Patient advised that medication may result in skin irritation, lightening (hypopigmentation), dryness, and burning.  In the event of skin irritation, the patient was advised to reduce the amount of the drug applied or use it less frequently.  Rarely, spots that are treated with hydroquinone can become darker (pseudoochronosis).  Should this occur, patient instructed to stop medication and call the office. The patient verbalized understanding of the proper use and possible adverse effects of hydroquinone.  All of the patient's questions and concerns were addressed.
Cimzia Pregnancy And Lactation Text: This medication crosses the placenta but can be considered safe in certain situations. Cimzia may be excreted in breast milk.
Imiquimod Counseling:  I discussed with the patient the risks of imiquimod including but not limited to erythema, scaling, itching, weeping, crusting, and pain.  Patient understands that the inflammatory response to imiquimod is variable from person to person and was educated regarded proper titration schedule.  If flu-like symptoms develop, patient knows to discontinue the medication and contact us.
Terbinafine Counseling: Patient counseling regarding adverse effects of terbinafine including but not limited to headache, diarrhea, rash, upset stomach, liver function test abnormalities, itching, taste/smell disturbance, nausea, abdominal pain, and flatulence.  There is a rare possibility of liver failure that can occur when taking terbinafine.  The patient understands that a baseline LFT and kidney function test may be required. The patient verbalized understanding of the proper use and possible adverse effects of terbinafine.  All of the patient's questions and concerns were addressed.
Arava Counseling:  Patient counseled regarding adverse effects of Arava including but not limited to nausea, vomiting, abnormalities in liver function tests. Patients may develop mouth sores, rash, diarrhea, and abnormalities in blood counts. The patient understands that monitoring is required including LFTs and blood counts.  There is a rare possibility of scarring of the liver and lung problems that can occur when taking methotrexate. Persistent nausea, loss of appetite, pale stools, dark urine, cough, and shortness of breath should be reported immediately. Patient advised to discontinue Arava treatment and consult with a physician prior to attempting conception. The patient will have to undergo a treatment to eliminate Arava from the body prior to conception.
Spironolactone Pregnancy And Lactation Text: This medication can cause feminization of the male fetus and should be avoided during pregnancy. The active metabolite is also found in breast milk.
Minocycline Pregnancy And Lactation Text: This medication is Pregnancy Category D and not consider safe during pregnancy. It is also excreted in breast milk.
Hydroxychloroquine Pregnancy And Lactation Text: This medication has been shown to cause fetal harm but it isn't assigned a Pregnancy Risk Category. There are small amounts excreted in breast milk.
Minocycline Counseling: Patient advised regarding possible photosensitivity and discoloration of the teeth, skin, lips, tongue and gums.  Patient instructed to avoid sunlight, if possible.  When exposed to sunlight, patients should wear protective clothing, sunglasses, and sunscreen.  The patient was instructed to call the office immediately if the following severe adverse effects occur:  hearing changes, easy bruising/bleeding, severe headache, or vision changes.  The patient verbalized understanding of the proper use and possible adverse effects of minocycline.  All of the patient's questions and concerns were addressed.
Quinolones Counseling:  I discussed with the patient the risks of fluoroquinolones including but not limited to GI upset, allergic reaction, drug rash, diarrhea, dizziness, photosensitivity, yeast infections, liver function test abnormalities, tendonitis/tendon rupture.
Libtayo Pregnancy And Lactation Text: This medication is contraindicated in pregnancy and when breast feeding.
Cyclophosphamide Counseling:  I discussed with the patient the risks of cyclophosphamide including but not limited to hair loss, hormonal abnormalities, decreased fertility, abdominal pain, diarrhea, nausea and vomiting, bone marrow suppression and infection. The patient understands that monitoring is required while taking this medication.
Detail Level: Simple
Skyrizi Counseling: I discussed with the patient the risks of risankizumab-rzaa including but not limited to immunosuppression, and serious infections.  The patient understands that monitoring is required including a PPD at baseline and must alert us or the primary physician if symptoms of infection or other concerning signs are noted.
SSKI Counseling:  I discussed with the patient the risks of SSKI including but not limited to thyroid abnormalities, metallic taste, GI upset, fever, headache, acne, arthralgias, paraesthesias, lymphadenopathy, easy bleeding, arrhythmias, and allergic reaction.
Terbinafine Pregnancy And Lactation Text: This medication is Pregnancy Category B and is considered safe during pregnancy. It is also excreted in breast milk and breast feeding isn't recommended.
Arava Pregnancy And Lactation Text: This medication is Pregnancy Category X and is absolutely contraindicated during pregnancy. It is unknown if it is excreted in breast milk.
Cosentyx Counseling:  I discussed with the patient the risks of Cosentyx including but not limited to worsening of Crohn's disease, immunosuppression, allergic reactions and infections.  The patient understands that monitoring is required including a PPD at baseline and must alert us or the primary physician if symptoms of infection or other concerning signs are noted.
Libtayo Counseling- I discussed with the patient the risks of Libtayo including but not limited to nausea, vomiting, diarrhea, and bone or muscle pain.  The patient verbalized understanding of the proper use and possible adverse effects of Libtayo.  All of the patient's questions and concerns were addressed.
Benzoyl Peroxide Counseling: Patient counseled that medicine may cause skin irritation and bleach clothing.  In the event of skin irritation, the patient was advised to reduce the amount of the drug applied or use it less frequently.   The patient verbalized understanding of the proper use and possible adverse effects of benzoyl peroxide.  All of the patient's questions and concerns were addressed.
Minoxidil Counseling: Minoxidil is a topical medication which can increase blood flow where it is applied. It is uncertain how this medication increases hair growth. Side effects are uncommon and include stinging and allergic reactions.
Clofazimine Counseling:  I discussed with the patient the risks of clofazimine including but not limited to skin and eye pigmentation, liver damage, nausea/vomiting, gastrointestinal bleeding and allergy.
Niacinamide Counseling: I recommended taking niacin or niacinamide, also know as vitamin B3, twice daily. Recent evidence suggests that taking vitamin B3 (500 mg twice daily) can reduce the risk of actinic keratoses and non-melanoma skin cancers. Side effects of vitamin B3 include flushing and headache.
Sski Pregnancy And Lactation Text: This medication is Pregnancy Category D and isn't considered safe during pregnancy. It is excreted in breast milk.
Cyclophosphamide Pregnancy And Lactation Text: This medication is Pregnancy Category D and it isn't considered safe during pregnancy. This medication is excreted in breast milk.
Tazorac Counseling:  Patient advised that medication is irritating and drying.  Patient may need to apply sparingly and wash off after an hour before eventually leaving it on overnight.  The patient verbalized understanding of the proper use and possible adverse effects of tazorac.  All of the patient's questions and concerns were addressed.
Include Pregnancy/Lactation Warning?: No
Stelara Counseling:  I discussed with the patient the risks of ustekinumab including but not limited to immunosuppression, malignancy, posterior leukoencephalopathy syndrome, and serious infections.  The patient understands that monitoring is required including a PPD at baseline and must alert us or the primary physician if symptoms of infection or other concerning signs are noted.
Dupixent Counseling: I discussed with the patient the risks of dupilumab including but not limited to eye infection and irritation, cold sores, injection site reactions, worsening of asthma, allergic reactions and increased risk of parasitic infection.  Live vaccines should be avoided while taking dupilumab. Dupilumab will also interact with certain medications such as warfarin and cyclosporine. The patient understands that monitoring is required and they must alert us or the primary physician if symptoms of infection or other concerning signs are noted.
Niacinamide Pregnancy And Lactation Text: These medications are considered safe during pregnancy.
Benzoyl Peroxide Pregnancy And Lactation Text: This medication is Pregnancy Category C. It is unknown if benzoyl peroxide is excreted in breast milk.
Cyclosporine Counseling:  I discussed with the patient the risks of cyclosporine including but not limited to hypertension, gingival hyperplasia,myelosuppression, immunosuppression, liver damage, kidney damage, neurotoxicity, lymphoma, and serious infections. The patient understands that monitoring is required including baseline blood pressure, CBC, CMP, lipid panel and uric acid, and then 1-2 times monthly CMP and blood pressure.
Cimetidine Counseling:  I discussed with the patient the risks of Cimetidine including but not limited to gynecomastia, headache, diarrhea, nausea, drowsiness, arrhythmias, pancreatitis, skin rashes, psychosis, bone marrow suppression and kidney toxicity.
Thalidomide Counseling: I discussed with the patient the risks of thalidomide including but not limited to birth defects, anxiety, weakness, chest pain, dizziness, cough and severe allergy.
Rifampin Counseling: I discussed with the patient the risks of rifampin including but not limited to liver damage, kidney damage, red-orange body fluids, nausea/vomiting and severe allergy.
Nsaids Counseling: NSAID Counseling: I discussed with the patient that NSAIDs should be taken with food. Prolonged use of NSAIDs can result in the development of stomach ulcers.  Patient advised to stop taking NSAIDs if abdominal pain occurs.  The patient verbalized understanding of the proper use and possible adverse effects of NSAIDs.  All of the patient's questions and concerns were addressed.
Rifampin Pregnancy And Lactation Text: This medication is Pregnancy Category C and it isn't know if it is safe during pregnancy. It is also excreted in breast milk and should not be used if you are breast feeding.
Carac Counseling:  I discussed with the patient the risks of Carac including but not limited to erythema, scaling, itching, weeping, crusting, and pain.
Mirvaso Counseling: Mirvaso is a topical medication which can decrease superficial blood flow where applied. Side effects are uncommon and include stinging, redness and allergic reactions.
Tazorac Pregnancy And Lactation Text: This medication is not safe during pregnancy. It is unknown if this medication is excreted in breast milk.
Colchicine Counseling:  Patient counseled regarding adverse effects including but not limited to stomach upset (nausea, vomiting, stomach pain, or diarrhea).  Patient instructed to limit alcohol consumption while taking this medication.  Colchicine may reduce blood counts especially with prolonged use.  The patient understands that monitoring of kidney function and blood counts may be required, especially at baseline. The patient verbalized understanding of the proper use and possible adverse effects of colchicine.  All of the patient's questions and concerns were addressed.
Cyclosporine Pregnancy And Lactation Text: This medication is Pregnancy Category C and it isn't know if it is safe during pregnancy. This medication is excreted in breast milk.
Dupixent Pregnancy And Lactation Text: This medication likely crosses the placenta but the risk for the fetus is uncertain. This medication is excreted in breast milk.
Topical Clindamycin Counseling: Patient counseled that this medication may cause skin irritation or allergic reactions.  In the event of skin irritation, the patient was advised to reduce the amount of the drug applied or use it less frequently.   The patient verbalized understanding of the proper use and possible adverse effects of clindamycin.  All of the patient's questions and concerns were addressed.
Dapsone Counseling: I discussed with the patient the risks of dapsone including but not limited to hemolytic anemia, agranulocytosis, rashes, methemoglobinemia, kidney failure, peripheral neuropathy, headaches, GI upset, and liver toxicity.  Patients who start dapsone require monitoring including baseline LFTs and weekly CBCs for the first month, then every month thereafter.  The patient verbalized understanding of the proper use and possible adverse effects of dapsone.  All of the patient's questions and concerns were addressed.
Nsaids Pregnancy And Lactation Text: These medications are considered safe up to 30 weeks gestation. It is excreted in breast milk.
Doxepin Pregnancy And Lactation Text: This medication is Pregnancy Category C and it isn't known if it is safe during pregnancy. It is also excreted in breast milk and breast feeding isn't recommended.
Taltz Counseling: I discussed with the patient the risks of ixekizumab including but not limited to immunosuppression, serious infections, worsening of inflammatory bowel disease and drug reactions.  The patient understands that monitoring is required including a PPD at baseline and must alert us or the primary physician if symptoms of infection or other concerning signs are noted.
Enbrel Counseling:  I discussed with the patient the risks of etanercept including but not limited to myelosuppression, immunosuppression, autoimmune hepatitis, demyelinating diseases, lymphoma, and infections.  The patient understands that monitoring is required including a PPD at baseline and must alert us or the primary physician if symptoms of infection or other concerning signs are noted.
Tranexamic Acid Counseling:  Patient advised of the small risk of bleeding problems with tranexamic acid. They were also instructed to call if they developed any nausea, vomiting or diarrhea. All of the patient's questions and concerns were addressed.
Carac Pregnancy And Lactation Text: This medication is Pregnancy Category X and contraindicated in pregnancy and in women who may become pregnant. It is unknown if this medication is excreted in breast milk.
Doxepin Counseling:  Patient advised that the medication is sedating and not to drive a car after taking this medication. Patient informed of potential adverse effects including but not limited to dry mouth, urinary retention, and blurry vision.  The patient verbalized understanding of the proper use and possible adverse effects of doxepin.  All of the patient's questions and concerns were addressed.
Sarecycline Counseling: Patient advised regarding possible photosensitivity and discoloration of the teeth, skin, lips, tongue and gums.  Patient instructed to avoid sunlight, if possible.  When exposed to sunlight, patients should wear protective clothing, sunglasses, and sunscreen.  The patient was instructed to call the office immediately if the following severe adverse effects occur:  hearing changes, easy bruising/bleeding, severe headache, or vision changes.  The patient verbalized understanding of the proper use and possible adverse effects of sarecycline.  All of the patient's questions and concerns were addressed.
Methotrexate Counseling:  Patient counseled regarding adverse effects of methotrexate including but not limited to nausea, vomiting, abnormalities in liver function tests. Patients may develop mouth sores, rash, diarrhea, and abnormalities in blood counts. The patient understands that monitoring is required including LFT's and blood counts.  There is a rare possibility of scarring of the liver and lung problems that can occur when taking methotrexate. Persistent nausea, loss of appetite, pale stools, dark urine, cough, and shortness of breath should be reported immediately. Patient advised to discontinue methotrexate treatment at least three months before attempting to become pregnant.  I discussed the need for folate supplements while taking methotrexate.  These supplements can decrease side effects during methotrexate treatment. The patient verbalized understanding of the proper use and possible adverse effects of methotrexate.  All of the patient's questions and concerns were addressed.
Dapsone Pregnancy And Lactation Text: This medication is Pregnancy Category C and is not considered safe during pregnancy or breast feeding.
Prednisone Counseling:  I discussed with the patient the risks of prolonged use of prednisone including but not limited to weight gain, insomnia, osteoporosis, mood changes, diabetes, susceptibility to infection, glaucoma and high blood pressure.  In cases where prednisone use is prolonged, patients should be monitored with blood pressure checks, serum glucose levels and an eye exam.  Additionally, the patient may need to be placed on GI prophylaxis, PCP prophylaxis, and calcium and vitamin D supplementation and/or a bisphosphonate.  The patient verbalized understanding of the proper use and the possible adverse effects of prednisone.  All of the patient's questions and concerns were addressed.
Valtrex Counseling: I discussed with the patient the risks of valacyclovir including but not limited to kidney damage, nausea, vomiting and severe allergy.  The patient understands that if the infection seems to be worsening or is not improving, they are to call.
Picato Counseling:  I discussed with the patient the risks of Picato including but not limited to erythema, scaling, itching, weeping, crusting, and pain.
Humira Counseling:  I discussed with the patient the risks of adalimumab including but not limited to myelosuppression, immunosuppression, autoimmune hepatitis, demyelinating diseases, lymphoma, and serious infections.  The patient understands that monitoring is required including a PPD at baseline and must alert us or the primary physician if symptoms of infection or other concerning signs are noted.
Topical Clindamycin Pregnancy And Lactation Text: This medication is Pregnancy Category B and is considered safe during pregnancy. It is unknown if it is excreted in breast milk.
Odomzo Counseling- I discussed with the patient the risks of Odomzo including but not limited to nausea, vomiting, diarrhea, constipation, weight loss, changes in the sense of taste, decreased appetite, muscle spasms, and hair loss.  The patient verbalized understanding of the proper use and possible adverse effects of Odomzo.  All of the patient's questions and concerns were addressed.
Methotrexate Pregnancy And Lactation Text: This medication is Pregnancy Category X and is known to cause fetal harm. This medication is excreted in breast milk.
Tranexamic Acid Pregnancy And Lactation Text: It is unknown if this medication is safe during pregnancy or breast feeding.
Calcipotriene Counseling:  I discussed with the patient the risks of calcipotriene including but not limited to erythema, scaling, itching, and irritation.
Hydroxyzine Pregnancy And Lactation Text: This medication is not safe during pregnancy and should not be taken. It is also excreted in breast milk and breast feeding isn't recommended.
Topical Sulfur Applications Counseling: Topical Sulfur Counseling: Patient counseled that this medication may cause skin irritation or allergic reactions.  In the event of skin irritation, the patient was advised to reduce the amount of the drug applied or use it less frequently.   The patient verbalized understanding of the proper use and possible adverse effects of topical sulfur application.  All of the patient's questions and concerns were addressed.
Azithromycin Counseling:  I discussed with the patient the risks of azithromycin including but not limited to GI upset, allergic reaction, drug rash, diarrhea, and yeast infections.
Hydroxyzine Counseling: Patient advised that the medication is sedating and not to drive a car after taking this medication.  Patient informed of potential adverse effects including but not limited to dry mouth, urinary retention, and blurry vision.  The patient verbalized understanding of the proper use and possible adverse effects of hydroxyzine.  All of the patient's questions and concerns were addressed.
Calcipotriene Pregnancy And Lactation Text: This medication has not been proven safe during pregnancy. It is unknown if this medication is excreted in breast milk.
Tetracycline Counseling: Patient counseled regarding possible photosensitivity and increased risk for sunburn.  Patient instructed to avoid sunlight, if possible.  When exposed to sunlight, patients should wear protective clothing, sunglasses, and sunscreen.  The patient was instructed to call the office immediately if the following severe adverse effects occur:  hearing changes, easy bruising/bleeding, severe headache, or vision changes.  The patient verbalized understanding of the proper use and possible adverse effects of tetracycline.  All of the patient's questions and concerns were addressed. Patient understands to avoid pregnancy while on therapy due to potential birth defects.
Tremfya Counseling: I discussed with the patient the risks of guselkumab including but not limited to immunosuppression, serious infections, worsening of inflammatory bowel disease and drug reactions.  The patient understands that monitoring is required including a PPD at baseline and must alert us or the primary physician if symptoms of infection or other concerning signs are noted.
Xeljanz Counseling: I discussed with the patient the risks of Xeljanz therapy including increased risk of infection, liver issues, headache, diarrhea, or cold symptoms. Live vaccines should be avoided. They were instructed to call if they have any problems.
Ilumya Counseling: I discussed with the patient the risks of tildrakizumab including but not limited to immunosuppression, malignancy, posterior leukoencephalopathy syndrome, and serious infections.  The patient understands that monitoring is required including a PPD at baseline and must alert us or the primary physician if symptoms of infection or other concerning signs are noted.
Protopic Counseling: Patient may experience a mild burning sensation during topical application. Protopic is not approved in children less than 2 years of age. There have been case reports of hematologic and skin malignancies in patients using topical calcineurin inhibitors although causality is questionable.
Azithromycin Pregnancy And Lactation Text: This medication is considered safe during pregnancy and is also secreted in breast milk.
Topical Sulfur Applications Pregnancy And Lactation Text: This medication is Pregnancy Category C and has an unknown safety profile during pregnancy. It is unknown if this topical medication is excreted in breast milk.
Otezla Counseling: The side effects of Otezla were discussed with the patient, including but not limited to worsening or new depression, weight loss, diarrhea, nausea, upper respiratory tract infection, and headache. Patient instructed to call the office should any adverse effect occur.  The patient verbalized understanding of the proper use and possible adverse effects of Otezla.  All the patient's questions and concerns were addressed.
Erivedge Counseling- I discussed with the patient the risks of Erivedge including but not limited to nausea, vomiting, diarrhea, constipation, weight loss, changes in the sense of taste, decreased appetite, muscle spasms, and hair loss.  The patient verbalized understanding of the proper use and possible adverse effects of Erivedge.  All of the patient's questions and concerns were addressed.
Valtrex Pregnancy And Lactation Text: this medication is Pregnancy Category B and is considered safe during pregnancy. This medication is not directly found in breast milk but it's metabolite acyclovir is present.
5-Fu Counseling: 5-Fluorouracil Counseling:  I discussed with the patient the risks of 5-fluorouracil including but not limited to erythema, scaling, itching, weeping, crusting, and pain.
Acitretin Counseling:  I discussed with the patient the risks of acitretin including but not limited to hair loss, dry lips/skin/eyes, liver damage, hyperlipidemia, depression/suicidal ideation, photosensitivity.  Serious rare side effects can include but are not limited to pancreatitis, pseudotumor cerebri, bony changes, clot formation/stroke/heart attack.  Patient understands that alcohol is contraindicated since it can result in liver toxicity and significantly prolong the elimination of the drug by many years.
Drysol Counseling:  I discussed with the patient the risks of drysol/aluminum chloride including but not limited to skin rash, itching, irritation, burning.
Albendazole Counseling:  I discussed with the patient the risks of albendazole including but not limited to cytopenia, kidney damage, nausea/vomiting and severe allergy.  The patient understands that this medication is being used in an off-label manner.
Fluconazole Counseling:  Patient counseled regarding adverse effects of fluconazole including but not limited to headache, diarrhea, nausea, upset stomach, liver function test abnormalities, taste disturbance, and stomach pain.  There is a rare possibility of liver failure that can occur when taking fluconazole.  The patient understands that monitoring of LFTs and kidney function test may be required, especially at baseline. The patient verbalized understanding of the proper use and possible adverse effects of fluconazole.  All of the patient's questions and concerns were addressed.
Wartpeel Counseling:  I discussed with the patient the risks of Wartpeel including but not limited to erythema, scaling, itching, weeping, crusting, and pain.
Finasteride Male Counseling: Finasteride Counseling:  I discussed with the patient the risks of use of finasteride including but not limited to decreased libido, decreased ejaculate volume, gynecomastia, and depression. Women should not handle medication.  All of the patient's questions and concerns were addressed.
Otezla Pregnancy And Lactation Text: This medication is Pregnancy Category C and it isn't known if it is safe during pregnancy. It is unknown if it is excreted in breast milk.
Bactrim Counseling:  I discussed with the patient the risks of sulfa antibiotics including but not limited to GI upset, allergic reaction, drug rash, diarrhea, dizziness, photosensitivity, and yeast infections.  Rarely, more serious reactions can occur including but not limited to aplastic anemia, agranulocytosis, methemoglobinemia, blood dyscrasias, liver or kidney failure, lung infiltrates or desquamative/blistering drug rashes.
Protopic Pregnancy And Lactation Text: This medication is Pregnancy Category C. It is unknown if this medication is excreted in breast milk when applied topically.
Cephalexin Counseling: I counseled the patient regarding use of cephalexin as an antibiotic for prophylactic and/or therapeutic purposes. Cephalexin (commonly prescribed under brand name Keflex) is a cephalosporin antibiotic which is active against numerous classes of bacteria, including most skin bacteria. Side effects may include nausea, diarrhea, gastrointestinal upset, rash, hives, yeast infections, and in rare cases, hepatitis, kidney disease, seizures, fever, confusion, neurologic symptoms, and others. Patients with severe allergies to penicillin medications are cautioned that there is about a 10% incidence of cross-reactivity with cephalosporins. When possible, patients with penicillin allergies should use alternatives to cephalosporins for antibiotic therapy.
Infliximab Counseling:  I discussed with the patient the risks of infliximab including but not limited to myelosuppression, immunosuppression, autoimmune hepatitis, demyelinating diseases, lymphoma, and serious infections.  The patient understands that monitoring is required including a PPD at baseline and must alert us or the primary physician if symptoms of infection or other concerning signs are noted.
Xolair Counseling:  Patient informed of potential adverse effects including but not limited to fever, muscle aches, rash and allergic reactions.  The patient verbalized understanding of the proper use and possible adverse effects of Xolair.  All of the patient's questions and concerns were addressed.
Finasteride Pregnancy And Lactation Text: This medication is absolutely contraindicated during pregnancy. It is unknown if it is excreted in breast milk.
Acitretin Pregnancy And Lactation Text: This medication is Pregnancy Category X and should not be given to women who are pregnant or may become pregnant in the future. This medication is excreted in breast milk.
Drysol Pregnancy And Lactation Text: This medication is considered safe during pregnancy and breast feeding.
Xelwatsonz Pregnancy And Lactation Text: This medication is Pregnancy Category D and is not considered safe during pregnancy.  The risk during breast feeding is also uncertain.
Rhofade Counseling: Rhofade is a topical medication which can decrease superficial blood flow where applied. Side effects are uncommon and include stinging, redness and allergic reactions.
Oxybutynin Counseling:  I discussed with the patient the risks of oxybutynin including but not limited to skin rash, drowsiness, dry mouth, difficulty urinating, and blurred vision.
Bactrim Pregnancy And Lactation Text: This medication is Pregnancy Category D and is known to cause fetal risk.  It is also excreted in breast milk.

## 2021-12-30 NOTE — PROCEDURE: INJECTION
Render J-Code Information In Note?: no
Dose Administered (Numbers Only - Mg, G, Mcg, Units, Cc): 0
Units: mg
Post-Care Instructions: I reviewed with the patient in detail post-care instructions. Patient understands to keep the injection sites clean and call the clinic if there is any redness, swelling or pain.
Procedure Information: Please note that the numeric value listed in the Medication (1) and associated J-code units and Medication (2) and associated J-code units variables are j-code amounts and do not represent either the concentration or the total amount of the medications injected.  I strongly recommend selecting no to the Render J-code information in note question. This will allow your note to be more clear. If you are billing j-codes with your injection codes you need to document the total amount of the medication injected. This amount should match the j-code units. For example, if you are injecting Triamcinolone 40mg as an intramuscular injection you would select 40 for the dose field and mg for the units. This would allow you to document  with 4 units (40mg = 10mg x 4). The total volume is not used to calculate j-codes only the amount of the medication administered.
Lot # (Optional): [id number]
Treatment Number: 1
Administered By (Optional): Blanca Goldsmith
Medication (1) And Associated J-Code Units: Bleomycin, 15 units
Consent: The risks of the medication was reviewed with the patient.
Expiration Date (Optional): 01/23
Units: cc
Total Volume Injected In Cc (Will Not Affected Billing): 0.3
Route: IL
Detail Level: None

## 2021-12-30 NOTE — PROCEDURE: MIPS QUALITY
Glucose maintained within prescribed range Not Progressing      Will report anxiety at manageable levels Progressing      Pt/Family maintain appropriate behavior and adhere to behavioral management agreement, if implemented Progressing      Confusion, deli
Quality 226: Preventive Care And Screening: Tobacco Use: Screening And Cessation Intervention: Patient screened for tobacco use and is an ex/non-smoker
Quality 130: Documentation Of Current Medications In The Medical Record: Current Medications Documented
Detail Level: Detailed

## 2022-01-08 ENCOUNTER — OFFICE VISIT (OUTPATIENT)
Dept: URGENT CARE | Facility: PHYSICIAN GROUP | Age: 52
End: 2022-01-08
Payer: COMMERCIAL

## 2022-01-08 ENCOUNTER — HOSPITAL ENCOUNTER (OUTPATIENT)
Facility: MEDICAL CENTER | Age: 52
End: 2022-01-08
Attending: FAMILY MEDICINE
Payer: COMMERCIAL

## 2022-01-08 VITALS
HEIGHT: 62 IN | WEIGHT: 190 LBS | OXYGEN SATURATION: 96 % | DIASTOLIC BLOOD PRESSURE: 64 MMHG | SYSTOLIC BLOOD PRESSURE: 108 MMHG | TEMPERATURE: 97.8 F | BODY MASS INDEX: 34.96 KG/M2 | RESPIRATION RATE: 16 BRPM | HEART RATE: 100 BPM

## 2022-01-08 DIAGNOSIS — B34.9 VIRAL ILLNESS: ICD-10-CM

## 2022-01-08 PROCEDURE — 99213 OFFICE O/P EST LOW 20 MIN: CPT | Performed by: FAMILY MEDICINE

## 2022-01-08 PROCEDURE — 0240U HCHG SARS-COV-2 COVID-19 NFCT DS RESP RNA 3 TRGT MIC: CPT

## 2022-01-08 NOTE — PROGRESS NOTES
"Subjective     Sury Vidal is a 51 y.o. female who presents with Fever (2x days), Cough (acute;), Body Aches, and Headache  Symptom for the past 2 to 3 days.  She is not vaccinated gets Covid.  Otherwise healthy.  She does not smoke.  She does not have any history of asthma either.  No exposure to other ill contacts.  Review of system otherwise negative.          HPI    ROS           Objective     /64 (BP Location: Right arm, Patient Position: Sitting, BP Cuff Size: Adult)   Pulse 100   Temp 36.6 °C (97.8 °F) (Temporal)   Resp 16   Ht 1.575 m (5' 2\")   Wt 86.2 kg (190 lb)   SpO2 96%   BMI 34.75 kg/m²      Physical Exam  Constitutional:       General: She is not in acute distress.     Appearance: She is not toxic-appearing or diaphoretic.   HENT:      Right Ear: External ear normal.      Left Ear: External ear normal.      Nose: No rhinorrhea.      Mouth/Throat:      Mouth: Mucous membranes are moist.      Pharynx: Oropharynx is clear. No oropharyngeal exudate or posterior oropharyngeal erythema.   Cardiovascular:      Rate and Rhythm: Normal rate and regular rhythm.      Heart sounds: No murmur heard.  No friction rub. No gallop.    Pulmonary:      Effort: Pulmonary effort is normal. No respiratory distress.      Breath sounds: No stridor. No wheezing, rhonchi or rales.   Skin:     Coloration: Skin is not jaundiced or pale.   Neurological:      Mental Status: She is alert and oriented to person, place, and time.   Psychiatric:         Behavior: Behavior normal.             Assessment & Plan        1. Viral illness  - CoV-2 and Flu A/B by PCR (24 hour In-House): Collect NP swab in V[facility]; Future         Continue symptomatic care  Warning signs reviewed  Follow up if not significantly improved as expected, sooner if any worsening or new symptoms          "

## 2022-01-09 DIAGNOSIS — B34.9 VIRAL ILLNESS: ICD-10-CM

## 2022-01-10 LAB
FLUAV RNA SPEC QL NAA+PROBE: NEGATIVE
FLUBV RNA SPEC QL NAA+PROBE: NEGATIVE
SARS-COV-2 RNA RESP QL NAA+PROBE: DETECTED
SPECIMEN SOURCE: ABNORMAL

## 2022-01-12 ENCOUNTER — TELEPHONE (OUTPATIENT)
Dept: MEDICAL GROUP | Facility: MEDICAL CENTER | Age: 52
End: 2022-01-12

## 2022-01-12 DIAGNOSIS — B37.31 VAGINAL YEAST INFECTION: ICD-10-CM

## 2022-01-12 RX ORDER — FLUCONAZOLE 150 MG/1
150 TABLET ORAL DAILY
Qty: 1 TABLET | Refills: 0 | Status: SHIPPED | OUTPATIENT
Start: 2022-01-12 | End: 2022-06-03

## 2022-01-13 NOTE — TELEPHONE ENCOUNTER
She can try 400-600 mg ibuprofen alternating with the tylenol if needed for fever and headache.  For the yeast infection, I will send over diflucan for her.  If no better, would like her to come in when feeling better.  Edelmira Saucedo M.D.

## 2022-01-13 NOTE — TELEPHONE ENCOUNTER
1. Caller Name: Sury Vidal                        Call Back Number: [phone] (home)       How would the patient prefer to be contacted with a response: Phone call OK to leave a detailed message    Pt left voicemail stating dhe is in day 7 of covid and has been experiencing severe headache and fighting fever. Pt stated she has been taking tylenol on a regular basis. Pt stated she has been having a hard time keeping control of her fever and is asking if there is something better to take. Pt did state she has also developed a yeast infection and is asking for Rx to help.

## 2022-01-28 ENCOUNTER — APPOINTMENT (RX ONLY)
Dept: URBAN - METROPOLITAN AREA CLINIC 4 | Facility: CLINIC | Age: 52
Setting detail: DERMATOLOGY
End: 2022-01-28

## 2022-01-28 PROBLEM — C44.311 BASAL CELL CARCINOMA OF SKIN OF NOSE: Status: ACTIVE | Noted: 2022-01-28

## 2022-01-28 PROCEDURE — 99212 OFFICE O/P EST SF 10 MIN: CPT

## 2022-01-28 PROCEDURE — ? COUNSELING

## 2022-01-28 PROCEDURE — ? PATIENT SPECIFIC COUNSELING

## 2022-01-28 PROCEDURE — ? MEDICATION COUNSELING

## 2022-01-28 PROCEDURE — ? INJECTION

## 2022-01-28 NOTE — PROCEDURE: MEDICATION COUNSELING
Griseofulvin Counseling:  I discussed with the patient the risks of griseofulvin including but not limited to photosensitivity, cytopenia, liver damage, nausea/vomiting and severe allergy.  The patient understands that this medication is best absorbed when taken with a fatty meal (e.g., ice cream or french fries).
Cephalexin Pregnancy And Lactation Text: This medication is Pregnancy Category B and considered safe during pregnancy.  It is also excreted in breast milk but can be used safely for shorter doses.
Doxycycline Pregnancy And Lactation Text: This medication is Pregnancy Category D and not consider safe during pregnancy. It is also excreted in breast milk but is considered safe for shorter treatment courses.
Elidel Counseling: Patient may experience a mild burning sensation during topical application. Elidel is not approved in children less than 2 years of age. There have been case reports of hematologic and skin malignancies in patients using topical calcineurin inhibitors although causality is questionable.
Xolair Pregnancy And Lactation Text: This medication is Pregnancy Category B and is considered safe during pregnancy. This medication is excreted in breast milk.
Zyclara Counseling:  I discussed with the patient the risks of imiquimod including but not limited to erythema, scaling, itching, weeping, crusting, and pain.  Patient understands that the inflammatory response to imiquimod is variable from person to person and was educated regarded proper titration schedule.  If flu-like symptoms develop, patient knows to discontinue the medication and contact us.
Gabapentin Counseling: I discussed with the patient the risks of gabapentin including but not limited to dizziness, somnolence, fatigue and ataxia.
Bexarotene Counseling:  I discussed with the patient the risks of bexarotene including but not limited to hair loss, dry lips/skin/eyes, liver abnormalities, hyperlipidemia, pancreatitis, depression/suicidal ideation, photosensitivity, drug rash/allergic reactions, hypothyroidism, anemia, leukopenia, infection, cataracts, and teratogenicity.  Patient understands that they will need regular blood tests to check lipid profile, liver function tests, white blood cell count, thyroid function tests and pregnancy test if applicable.
Infliximab Pregnancy And Lactation Text: This medication is Pregnancy Category B and is considered safe during pregnancy. It is unknown if this medication is excreted in breast milk.
Rhofade Pregnancy And Lactation Text: This medication has not been assigned a Pregnancy Risk Category. It is unknown if the medication is excreted in breast milk.
Ivermectin Counseling:  Patient instructed to take medication on an empty stomach with a full glass of water.  Patient informed of potential adverse effects including but not limited to nausea, diarrhea, dizziness, itching, and swelling of the extremities or lymph nodes.  The patient verbalized understanding of the proper use and possible adverse effects of ivermectin.  All of the patient's questions and concerns were addressed.
Elidel Pregnancy And Lactation Text: This medication is Pregnancy Category C. It is unknown if this medication is excreted in breast milk.
Solaraze Pregnancy And Lactation Text: This medication is Pregnancy Category B and is considered safe. There is some data to suggest avoiding during the third trimester. It is unknown if this medication is excreted in breast milk.
Clindamycin Counseling: I counseled the patient regarding use of clindamycin as an antibiotic for prophylactic and/or therapeutic purposes. Clindamycin is active against numerous classes of bacteria, including skin bacteria. Side effects may include nausea, diarrhea, gastrointestinal upset, rash, hives, yeast infections, and in rare cases, colitis.
Gabapentin Pregnancy And Lactation Text: This medication is Pregnancy Category C and isn't considered safe during pregnancy. It is excreted in breast milk.
Bexarotene Pregnancy And Lactation Text: This medication is Pregnancy Category X and should not be given to women who are pregnant or may become pregnant. This medication should not be used if you are breast feeding.
Rituxan Counseling:  I discussed with the patient the risks of Rituxan infusions. Side effects can include infusion reactions, severe drug rashes including mucocutaneous reactions, reactivation of latent hepatitis and other infections and rarely progressive multifocal leukoencephalopathy.  All of the patient's questions and concerns were addressed.
Solaraze Counseling:  I discussed with the patient the risks of Solaraze including but not limited to erythema, scaling, itching, weeping, crusting, and pain.
Ivermectin Pregnancy And Lactation Text: This medication is Pregnancy Category C and it isn't known if it is safe during pregnancy. It is also excreted in breast milk.
Griseofulvin Pregnancy And Lactation Text: This medication is Pregnancy Category X and is known to cause serious birth defects. It is unknown if this medication is excreted in breast milk but breast feeding should be avoided.
Propranolol Counseling:  I discussed with the patient the risks of propranolol including but not limited to low heart rate, low blood pressure, low blood sugar, restlessness and increased cold sensitivity. They should call the office if they experience any of these side effects.
Erythromycin Counseling:  I discussed with the patient the risks of erythromycin including but not limited to GI upset, allergic reaction, drug rash, diarrhea, increase in liver enzymes, and yeast infections.
Birth Control Pills Counseling: Birth Control Pill Counseling: I discussed with the patient the potential side effects of OCPs including but not limited to increased risk of stroke, heart attack, thrombophlebitis, deep venous thrombosis, hepatic adenomas, breast changes, GI upset, headaches, and depression.  The patient verbalized understanding of the proper use and possible adverse effects of OCPs. All of the patient's questions and concerns were addressed.
Clindamycin Pregnancy And Lactation Text: This medication can be used in pregnancy if certain situations. Clindamycin is also present in breast milk.
Isotretinoin Counseling: Patient should get monthly blood tests, not donate blood, not drive at night if vision affected, not share medication, and not undergo elective surgery for 6 months after tx completed. Side effects reviewed, pt to contact office should one occur.
Eucrisa Counseling: Patient may experience a mild burning sensation during topical application. Eucrisa is not approved in children less than 2 years of age.
Azathioprine Counseling:  I discussed with the patient the risks of azathioprine including but not limited to myelosuppression, immunosuppression, hepatotoxicity, lymphoma, and infections.  The patient understands that monitoring is required including baseline LFTs, Creatinine, possible TPMP genotyping and weekly CBCs for the first month and then every 2 weeks thereafter.  The patient verbalized understanding of the proper use and possible adverse effects of azathioprine.  All of the patient's questions and concerns were addressed.
Itraconazole Counseling:  I discussed with the patient the risks of itraconazole including but not limited to liver damage, nausea/vomiting, neuropathy, and severe allergy.  The patient understands that this medication is best absorbed when taken with acidic beverages such as non-diet cola or ginger ale.  The patient understands that monitoring is required including baseline LFTs and repeat LFTs at intervals.  The patient understands that they are to contact us or the primary physician if concerning signs are noted.
Erythromycin Pregnancy And Lactation Text: This medication is Pregnancy Category B and is considered safe during pregnancy. It is also excreted in breast milk.
Glycopyrrolate Counseling:  I discussed with the patient the risks of glycopyrrolate including but not limited to skin rash, drowsiness, dry mouth, difficulty urinating, and blurred vision.
Rituxan Pregnancy And Lactation Text: This medication is Pregnancy Category C and it isn't know if it is safe during pregnancy. It is unknown if this medication is excreted in breast milk but similar antibodies are known to be excreted.
Propranolol Pregnancy And Lactation Text: This medication is Pregnancy Category C and it isn't known if it is safe during pregnancy. It is excreted in breast milk.
Azathioprine Pregnancy And Lactation Text: This medication is Pregnancy Category D and isn't considered safe during pregnancy. It is unknown if this medication is excreted in breast milk.
Opioid Pregnancy And Lactation Text: These medications can lead to premature delivery and should be avoided during pregnancy. These medications are also present in breast milk in small amounts.
Doxycycline Counseling:  Patient counseled regarding possible photosensitivity and increased risk for sunburn.  Patient instructed to avoid sunlight, if possible.  When exposed to sunlight, patients should wear protective clothing, sunglasses, and sunscreen.  The patient was instructed to call the office immediately if the following severe adverse effects occur:  hearing changes, easy bruising/bleeding, severe headache, or vision changes.  The patient verbalized understanding of the proper use and possible adverse effects of doxycycline.  All of the patient's questions and concerns were addressed.
Ketoconazole Counseling:   Patient counseled regarding improving absorption with orange juice.  Adverse effects include but are not limited to breast enlargement, headache, diarrhea, nausea, upset stomach, liver function test abnormalities, taste disturbance, and stomach pain.  There is a rare possibility of liver failure that can occur when taking ketoconazole. The patient understands that monitoring of LFTs may be required, especially at baseline. The patient verbalized understanding of the proper use and possible adverse effects of ketoconazole.  All of the patient's questions and concerns were addressed.
Birth Control Pills Pregnancy And Lactation Text: This medication should be avoided if pregnant and for the first 30 days post-partum.
Siliq Counseling:  I discussed with the patient the risks of Siliq including but not limited to new or worsening depression, suicidal thoughts and behavior, immunosuppression, malignancy, posterior leukoencephalopathy syndrome, and serious infections.  The patient understands that monitoring is required including a PPD at baseline and must alert us or the primary physician if symptoms of infection or other concerning signs are noted. There is also a special program designed to monitor depression which is required with Siliq.
Isotretinoin Pregnancy And Lactation Text: This medication is Pregnancy Category X and is considered extremely dangerous during pregnancy. It is unknown if it is excreted in breast milk.
Itraconazole Pregnancy And Lactation Text: This medication is Pregnancy Category C and it isn't know if it is safe during pregnancy. It is also excreted in breast milk.
Eucrisa Pregnancy And Lactation Text: This medication has not been assigned a Pregnancy Risk Category but animal studies failed to show danger with the topical medication. It is unknown if the medication is excreted in breast milk.
Topical Retinoid counseling:  Patient advised to apply a pea-sized amount only at bedtime and wait 30 minutes after washing their face before applying.  If too drying, patient may add a non-comedogenic moisturizer. The patient verbalized understanding of the proper use and possible adverse effects of retinoids.  All of the patient's questions and concerns were addressed.
Metronidazole Counseling:  I discussed with the patient the risks of metronidazole including but not limited to seizures, nausea/vomiting, a metallic taste in the mouth, nausea/vomiting and severe allergy.
Opioid Counseling: I discussed with the patient the potential side effects of opioids including but not limited to addiction, altered mental status, and depression. I stressed avoiding alcohol, benzodiazepines, muscle relaxants and sleep aids unless specifically okayed by a physician. The patient verbalized understanding of the proper use and possible adverse effects of opioids. All of the patient's questions and concerns were addressed. They were instructed to flush the remaining pills down the toilet if they did not need them for pain.
Glycopyrrolate Pregnancy And Lactation Text: This medication is Pregnancy Category B and is considered safe during pregnancy. It is unknown if it is excreted breast milk.
Ketoconazole Pregnancy And Lactation Text: This medication is Pregnancy Category C and it isn't know if it is safe during pregnancy. It is also excreted in breast milk and breast feeding isn't recommended.
Cellcept Counseling:  I discussed with the patient the risks of mycophenolate mofetil including but not limited to infection/immunosuppression, GI upset, hypokalemia, hypercholesterolemia, bone marrow suppression, lymphoproliferative disorders, malignancy, GI ulceration/bleed/perforation, colitis, interstitial lung disease, kidney failure, progressive multifocal leukoencephalopathy, and birth defects.  The patient understands that monitoring is required including a baseline creatinine and regular CBC testing. In addition, patient must alert us immediately if symptoms of infection or other concerning signs are noted.
High Dose Vitamin A Pregnancy And Lactation Text: High dose vitamin A therapy is contraindicated during pregnancy and breast feeding.
Spironolactone Counseling: Patient advised regarding risks of diarrhea, abdominal pain, hyperkalemia, birth defects (for female patients), liver toxicity and renal toxicity. The patient may need blood work to monitor liver and kidney function and potassium levels while on therapy. The patient verbalized understanding of the proper use and possible adverse effects of spironolactone.  All of the patient's questions and concerns were addressed.
Simponi Counseling:  I discussed with the patient the risks of golimumab including but not limited to myelosuppression, immunosuppression, autoimmune hepatitis, demyelinating diseases, lymphoma, and serious infections.  The patient understands that monitoring is required including a PPD at baseline and must alert us or the primary physician if symptoms of infection or other concerning signs are noted.
Cimzia Counseling:  I discussed with the patient the risks of Cimzia including but not limited to immunosuppression, allergic reactions and infections.  The patient understands that monitoring is required including a PPD at baseline and must alert us or the primary physician if symptoms of infection or other concerning signs are noted.
Metronidazole Pregnancy And Lactation Text: This medication is Pregnancy Category B and considered safe during pregnancy.  It is also excreted in breast milk.
Siliq Pregnancy And Lactation Text: The risk during pregnancy and breastfeeding is uncertain with this medication.
Hydroxychloroquine Counseling:  I discussed with the patient that a baseline ophthalmologic exam is needed at the start of therapy and every year thereafter while on therapy. A CBC may also be warranted for monitoring.  The side effects of this medication were discussed with the patient, including but not limited to agranulocytosis, aplastic anemia, seizures, rashes, retinopathy, and liver toxicity. Patient instructed to call the office should any adverse effect occur.  The patient verbalized understanding of the proper use and possible adverse effects of Plaquenil.  All the patient's questions and concerns were addressed.
High Dose Vitamin A Counseling: Side effects reviewed, pt to contact office should one occur.
Hydroquinone Counseling:  Patient advised that medication may result in skin irritation, lightening (hypopigmentation), dryness, and burning.  In the event of skin irritation, the patient was advised to reduce the amount of the drug applied or use it less frequently.  Rarely, spots that are treated with hydroquinone can become darker (pseudoochronosis).  Should this occur, patient instructed to stop medication and call the office. The patient verbalized understanding of the proper use and possible adverse effects of hydroquinone.  All of the patient's questions and concerns were addressed.
Cimzia Pregnancy And Lactation Text: This medication crosses the placenta but can be considered safe in certain situations. Cimzia may be excreted in breast milk.
Imiquimod Counseling:  I discussed with the patient the risks of imiquimod including but not limited to erythema, scaling, itching, weeping, crusting, and pain.  Patient understands that the inflammatory response to imiquimod is variable from person to person and was educated regarded proper titration schedule.  If flu-like symptoms develop, patient knows to discontinue the medication and contact us.
Terbinafine Counseling: Patient counseling regarding adverse effects of terbinafine including but not limited to headache, diarrhea, rash, upset stomach, liver function test abnormalities, itching, taste/smell disturbance, nausea, abdominal pain, and flatulence.  There is a rare possibility of liver failure that can occur when taking terbinafine.  The patient understands that a baseline LFT and kidney function test may be required. The patient verbalized understanding of the proper use and possible adverse effects of terbinafine.  All of the patient's questions and concerns were addressed.
Arava Counseling:  Patient counseled regarding adverse effects of Arava including but not limited to nausea, vomiting, abnormalities in liver function tests. Patients may develop mouth sores, rash, diarrhea, and abnormalities in blood counts. The patient understands that monitoring is required including LFTs and blood counts.  There is a rare possibility of scarring of the liver and lung problems that can occur when taking methotrexate. Persistent nausea, loss of appetite, pale stools, dark urine, cough, and shortness of breath should be reported immediately. Patient advised to discontinue Arava treatment and consult with a physician prior to attempting conception. The patient will have to undergo a treatment to eliminate Arava from the body prior to conception.
Spironolactone Pregnancy And Lactation Text: This medication can cause feminization of the male fetus and should be avoided during pregnancy. The active metabolite is also found in breast milk.
Minocycline Pregnancy And Lactation Text: This medication is Pregnancy Category D and not consider safe during pregnancy. It is also excreted in breast milk.
Hydroxychloroquine Pregnancy And Lactation Text: This medication has been shown to cause fetal harm but it isn't assigned a Pregnancy Risk Category. There are small amounts excreted in breast milk.
Minocycline Counseling: Patient advised regarding possible photosensitivity and discoloration of the teeth, skin, lips, tongue and gums.  Patient instructed to avoid sunlight, if possible.  When exposed to sunlight, patients should wear protective clothing, sunglasses, and sunscreen.  The patient was instructed to call the office immediately if the following severe adverse effects occur:  hearing changes, easy bruising/bleeding, severe headache, or vision changes.  The patient verbalized understanding of the proper use and possible adverse effects of minocycline.  All of the patient's questions and concerns were addressed.
Quinolones Counseling:  I discussed with the patient the risks of fluoroquinolones including but not limited to GI upset, allergic reaction, drug rash, diarrhea, dizziness, photosensitivity, yeast infections, liver function test abnormalities, tendonitis/tendon rupture.
Libtayo Pregnancy And Lactation Text: This medication is contraindicated in pregnancy and when breast feeding.
Cyclophosphamide Counseling:  I discussed with the patient the risks of cyclophosphamide including but not limited to hair loss, hormonal abnormalities, decreased fertility, abdominal pain, diarrhea, nausea and vomiting, bone marrow suppression and infection. The patient understands that monitoring is required while taking this medication.
Detail Level: Simple
Skyrizi Counseling: I discussed with the patient the risks of risankizumab-rzaa including but not limited to immunosuppression, and serious infections.  The patient understands that monitoring is required including a PPD at baseline and must alert us or the primary physician if symptoms of infection or other concerning signs are noted.
SSKI Counseling:  I discussed with the patient the risks of SSKI including but not limited to thyroid abnormalities, metallic taste, GI upset, fever, headache, acne, arthralgias, paraesthesias, lymphadenopathy, easy bleeding, arrhythmias, and allergic reaction.
Terbinafine Pregnancy And Lactation Text: This medication is Pregnancy Category B and is considered safe during pregnancy. It is also excreted in breast milk and breast feeding isn't recommended.
Arava Pregnancy And Lactation Text: This medication is Pregnancy Category X and is absolutely contraindicated during pregnancy. It is unknown if it is excreted in breast milk.
Cosentyx Counseling:  I discussed with the patient the risks of Cosentyx including but not limited to worsening of Crohn's disease, immunosuppression, allergic reactions and infections.  The patient understands that monitoring is required including a PPD at baseline and must alert us or the primary physician if symptoms of infection or other concerning signs are noted.
Libtayo Counseling- I discussed with the patient the risks of Libtayo including but not limited to nausea, vomiting, diarrhea, and bone or muscle pain.  The patient verbalized understanding of the proper use and possible adverse effects of Libtayo.  All of the patient's questions and concerns were addressed.
Benzoyl Peroxide Counseling: Patient counseled that medicine may cause skin irritation and bleach clothing.  In the event of skin irritation, the patient was advised to reduce the amount of the drug applied or use it less frequently.   The patient verbalized understanding of the proper use and possible adverse effects of benzoyl peroxide.  All of the patient's questions and concerns were addressed.
Minoxidil Counseling: Minoxidil is a topical medication which can increase blood flow where it is applied. It is uncertain how this medication increases hair growth. Side effects are uncommon and include stinging and allergic reactions.
Clofazimine Counseling:  I discussed with the patient the risks of clofazimine including but not limited to skin and eye pigmentation, liver damage, nausea/vomiting, gastrointestinal bleeding and allergy.
Niacinamide Counseling: I recommended taking niacin or niacinamide, also know as vitamin B3, twice daily. Recent evidence suggests that taking vitamin B3 (500 mg twice daily) can reduce the risk of actinic keratoses and non-melanoma skin cancers. Side effects of vitamin B3 include flushing and headache.
Sski Pregnancy And Lactation Text: This medication is Pregnancy Category D and isn't considered safe during pregnancy. It is excreted in breast milk.
Cyclophosphamide Pregnancy And Lactation Text: This medication is Pregnancy Category D and it isn't considered safe during pregnancy. This medication is excreted in breast milk.
Tazorac Counseling:  Patient advised that medication is irritating and drying.  Patient may need to apply sparingly and wash off after an hour before eventually leaving it on overnight.  The patient verbalized understanding of the proper use and possible adverse effects of tazorac.  All of the patient's questions and concerns were addressed.
Include Pregnancy/Lactation Warning?: No
Stelara Counseling:  I discussed with the patient the risks of ustekinumab including but not limited to immunosuppression, malignancy, posterior leukoencephalopathy syndrome, and serious infections.  The patient understands that monitoring is required including a PPD at baseline and must alert us or the primary physician if symptoms of infection or other concerning signs are noted.
Dupixent Counseling: I discussed with the patient the risks of dupilumab including but not limited to eye infection and irritation, cold sores, injection site reactions, worsening of asthma, allergic reactions and increased risk of parasitic infection.  Live vaccines should be avoided while taking dupilumab. Dupilumab will also interact with certain medications such as warfarin and cyclosporine. The patient understands that monitoring is required and they must alert us or the primary physician if symptoms of infection or other concerning signs are noted.
Niacinamide Pregnancy And Lactation Text: These medications are considered safe during pregnancy.
Benzoyl Peroxide Pregnancy And Lactation Text: This medication is Pregnancy Category C. It is unknown if benzoyl peroxide is excreted in breast milk.
Cyclosporine Counseling:  I discussed with the patient the risks of cyclosporine including but not limited to hypertension, gingival hyperplasia,myelosuppression, immunosuppression, liver damage, kidney damage, neurotoxicity, lymphoma, and serious infections. The patient understands that monitoring is required including baseline blood pressure, CBC, CMP, lipid panel and uric acid, and then 1-2 times monthly CMP and blood pressure.
Cimetidine Counseling:  I discussed with the patient the risks of Cimetidine including but not limited to gynecomastia, headache, diarrhea, nausea, drowsiness, arrhythmias, pancreatitis, skin rashes, psychosis, bone marrow suppression and kidney toxicity.
Thalidomide Counseling: I discussed with the patient the risks of thalidomide including but not limited to birth defects, anxiety, weakness, chest pain, dizziness, cough and severe allergy.
Rifampin Counseling: I discussed with the patient the risks of rifampin including but not limited to liver damage, kidney damage, red-orange body fluids, nausea/vomiting and severe allergy.
Nsaids Counseling: NSAID Counseling: I discussed with the patient that NSAIDs should be taken with food. Prolonged use of NSAIDs can result in the development of stomach ulcers.  Patient advised to stop taking NSAIDs if abdominal pain occurs.  The patient verbalized understanding of the proper use and possible adverse effects of NSAIDs.  All of the patient's questions and concerns were addressed.
Rifampin Pregnancy And Lactation Text: This medication is Pregnancy Category C and it isn't know if it is safe during pregnancy. It is also excreted in breast milk and should not be used if you are breast feeding.
Carac Counseling:  I discussed with the patient the risks of Carac including but not limited to erythema, scaling, itching, weeping, crusting, and pain.
Mirvaso Counseling: Mirvaso is a topical medication which can decrease superficial blood flow where applied. Side effects are uncommon and include stinging, redness and allergic reactions.
Tazorac Pregnancy And Lactation Text: This medication is not safe during pregnancy. It is unknown if this medication is excreted in breast milk.
Colchicine Counseling:  Patient counseled regarding adverse effects including but not limited to stomach upset (nausea, vomiting, stomach pain, or diarrhea).  Patient instructed to limit alcohol consumption while taking this medication.  Colchicine may reduce blood counts especially with prolonged use.  The patient understands that monitoring of kidney function and blood counts may be required, especially at baseline. The patient verbalized understanding of the proper use and possible adverse effects of colchicine.  All of the patient's questions and concerns were addressed.
Cyclosporine Pregnancy And Lactation Text: This medication is Pregnancy Category C and it isn't know if it is safe during pregnancy. This medication is excreted in breast milk.
Dupixent Pregnancy And Lactation Text: This medication likely crosses the placenta but the risk for the fetus is uncertain. This medication is excreted in breast milk.
Topical Clindamycin Counseling: Patient counseled that this medication may cause skin irritation or allergic reactions.  In the event of skin irritation, the patient was advised to reduce the amount of the drug applied or use it less frequently.   The patient verbalized understanding of the proper use and possible adverse effects of clindamycin.  All of the patient's questions and concerns were addressed.
Dapsone Counseling: I discussed with the patient the risks of dapsone including but not limited to hemolytic anemia, agranulocytosis, rashes, methemoglobinemia, kidney failure, peripheral neuropathy, headaches, GI upset, and liver toxicity.  Patients who start dapsone require monitoring including baseline LFTs and weekly CBCs for the first month, then every month thereafter.  The patient verbalized understanding of the proper use and possible adverse effects of dapsone.  All of the patient's questions and concerns were addressed.
Nsaids Pregnancy And Lactation Text: These medications are considered safe up to 30 weeks gestation. It is excreted in breast milk.
Doxepin Pregnancy And Lactation Text: This medication is Pregnancy Category C and it isn't known if it is safe during pregnancy. It is also excreted in breast milk and breast feeding isn't recommended.
Taltz Counseling: I discussed with the patient the risks of ixekizumab including but not limited to immunosuppression, serious infections, worsening of inflammatory bowel disease and drug reactions.  The patient understands that monitoring is required including a PPD at baseline and must alert us or the primary physician if symptoms of infection or other concerning signs are noted.
Enbrel Counseling:  I discussed with the patient the risks of etanercept including but not limited to myelosuppression, immunosuppression, autoimmune hepatitis, demyelinating diseases, lymphoma, and infections.  The patient understands that monitoring is required including a PPD at baseline and must alert us or the primary physician if symptoms of infection or other concerning signs are noted.
Tranexamic Acid Counseling:  Patient advised of the small risk of bleeding problems with tranexamic acid. They were also instructed to call if they developed any nausea, vomiting or diarrhea. All of the patient's questions and concerns were addressed.
Carac Pregnancy And Lactation Text: This medication is Pregnancy Category X and contraindicated in pregnancy and in women who may become pregnant. It is unknown if this medication is excreted in breast milk.
Doxepin Counseling:  Patient advised that the medication is sedating and not to drive a car after taking this medication. Patient informed of potential adverse effects including but not limited to dry mouth, urinary retention, and blurry vision.  The patient verbalized understanding of the proper use and possible adverse effects of doxepin.  All of the patient's questions and concerns were addressed.
Sarecycline Counseling: Patient advised regarding possible photosensitivity and discoloration of the teeth, skin, lips, tongue and gums.  Patient instructed to avoid sunlight, if possible.  When exposed to sunlight, patients should wear protective clothing, sunglasses, and sunscreen.  The patient was instructed to call the office immediately if the following severe adverse effects occur:  hearing changes, easy bruising/bleeding, severe headache, or vision changes.  The patient verbalized understanding of the proper use and possible adverse effects of sarecycline.  All of the patient's questions and concerns were addressed.
Methotrexate Counseling:  Patient counseled regarding adverse effects of methotrexate including but not limited to nausea, vomiting, abnormalities in liver function tests. Patients may develop mouth sores, rash, diarrhea, and abnormalities in blood counts. The patient understands that monitoring is required including LFT's and blood counts.  There is a rare possibility of scarring of the liver and lung problems that can occur when taking methotrexate. Persistent nausea, loss of appetite, pale stools, dark urine, cough, and shortness of breath should be reported immediately. Patient advised to discontinue methotrexate treatment at least three months before attempting to become pregnant.  I discussed the need for folate supplements while taking methotrexate.  These supplements can decrease side effects during methotrexate treatment. The patient verbalized understanding of the proper use and possible adverse effects of methotrexate.  All of the patient's questions and concerns were addressed.
Dapsone Pregnancy And Lactation Text: This medication is Pregnancy Category C and is not considered safe during pregnancy or breast feeding.
Prednisone Counseling:  I discussed with the patient the risks of prolonged use of prednisone including but not limited to weight gain, insomnia, osteoporosis, mood changes, diabetes, susceptibility to infection, glaucoma and high blood pressure.  In cases where prednisone use is prolonged, patients should be monitored with blood pressure checks, serum glucose levels and an eye exam.  Additionally, the patient may need to be placed on GI prophylaxis, PCP prophylaxis, and calcium and vitamin D supplementation and/or a bisphosphonate.  The patient verbalized understanding of the proper use and the possible adverse effects of prednisone.  All of the patient's questions and concerns were addressed.
Valtrex Counseling: I discussed with the patient the risks of valacyclovir including but not limited to kidney damage, nausea, vomiting and severe allergy.  The patient understands that if the infection seems to be worsening or is not improving, they are to call.
Picato Counseling:  I discussed with the patient the risks of Picato including but not limited to erythema, scaling, itching, weeping, crusting, and pain.
Humira Counseling:  I discussed with the patient the risks of adalimumab including but not limited to myelosuppression, immunosuppression, autoimmune hepatitis, demyelinating diseases, lymphoma, and serious infections.  The patient understands that monitoring is required including a PPD at baseline and must alert us or the primary physician if symptoms of infection or other concerning signs are noted.
Topical Clindamycin Pregnancy And Lactation Text: This medication is Pregnancy Category B and is considered safe during pregnancy. It is unknown if it is excreted in breast milk.
Odomzo Counseling- I discussed with the patient the risks of Odomzo including but not limited to nausea, vomiting, diarrhea, constipation, weight loss, changes in the sense of taste, decreased appetite, muscle spasms, and hair loss.  The patient verbalized understanding of the proper use and possible adverse effects of Odomzo.  All of the patient's questions and concerns were addressed.
Methotrexate Pregnancy And Lactation Text: This medication is Pregnancy Category X and is known to cause fetal harm. This medication is excreted in breast milk.
Tranexamic Acid Pregnancy And Lactation Text: It is unknown if this medication is safe during pregnancy or breast feeding.
Calcipotriene Counseling:  I discussed with the patient the risks of calcipotriene including but not limited to erythema, scaling, itching, and irritation.
Hydroxyzine Pregnancy And Lactation Text: This medication is not safe during pregnancy and should not be taken. It is also excreted in breast milk and breast feeding isn't recommended.
Topical Sulfur Applications Counseling: Topical Sulfur Counseling: Patient counseled that this medication may cause skin irritation or allergic reactions.  In the event of skin irritation, the patient was advised to reduce the amount of the drug applied or use it less frequently.   The patient verbalized understanding of the proper use and possible adverse effects of topical sulfur application.  All of the patient's questions and concerns were addressed.
Azithromycin Counseling:  I discussed with the patient the risks of azithromycin including but not limited to GI upset, allergic reaction, drug rash, diarrhea, and yeast infections.
Hydroxyzine Counseling: Patient advised that the medication is sedating and not to drive a car after taking this medication.  Patient informed of potential adverse effects including but not limited to dry mouth, urinary retention, and blurry vision.  The patient verbalized understanding of the proper use and possible adverse effects of hydroxyzine.  All of the patient's questions and concerns were addressed.
Calcipotriene Pregnancy And Lactation Text: This medication has not been proven safe during pregnancy. It is unknown if this medication is excreted in breast milk.
Tetracycline Counseling: Patient counseled regarding possible photosensitivity and increased risk for sunburn.  Patient instructed to avoid sunlight, if possible.  When exposed to sunlight, patients should wear protective clothing, sunglasses, and sunscreen.  The patient was instructed to call the office immediately if the following severe adverse effects occur:  hearing changes, easy bruising/bleeding, severe headache, or vision changes.  The patient verbalized understanding of the proper use and possible adverse effects of tetracycline.  All of the patient's questions and concerns were addressed. Patient understands to avoid pregnancy while on therapy due to potential birth defects.
Tremfya Counseling: I discussed with the patient the risks of guselkumab including but not limited to immunosuppression, serious infections, worsening of inflammatory bowel disease and drug reactions.  The patient understands that monitoring is required including a PPD at baseline and must alert us or the primary physician if symptoms of infection or other concerning signs are noted.
Xeljanz Counseling: I discussed with the patient the risks of Xeljanz therapy including increased risk of infection, liver issues, headache, diarrhea, or cold symptoms. Live vaccines should be avoided. They were instructed to call if they have any problems.
Ilumya Counseling: I discussed with the patient the risks of tildrakizumab including but not limited to immunosuppression, malignancy, posterior leukoencephalopathy syndrome, and serious infections.  The patient understands that monitoring is required including a PPD at baseline and must alert us or the primary physician if symptoms of infection or other concerning signs are noted.
Protopic Counseling: Patient may experience a mild burning sensation during topical application. Protopic is not approved in children less than 2 years of age. There have been case reports of hematologic and skin malignancies in patients using topical calcineurin inhibitors although causality is questionable.
Azithromycin Pregnancy And Lactation Text: This medication is considered safe during pregnancy and is also secreted in breast milk.
Topical Sulfur Applications Pregnancy And Lactation Text: This medication is Pregnancy Category C and has an unknown safety profile during pregnancy. It is unknown if this topical medication is excreted in breast milk.
Otezla Counseling: The side effects of Otezla were discussed with the patient, including but not limited to worsening or new depression, weight loss, diarrhea, nausea, upper respiratory tract infection, and headache. Patient instructed to call the office should any adverse effect occur.  The patient verbalized understanding of the proper use and possible adverse effects of Otezla.  All the patient's questions and concerns were addressed.
Erivedge Counseling- I discussed with the patient the risks of Erivedge including but not limited to nausea, vomiting, diarrhea, constipation, weight loss, changes in the sense of taste, decreased appetite, muscle spasms, and hair loss.  The patient verbalized understanding of the proper use and possible adverse effects of Erivedge.  All of the patient's questions and concerns were addressed.
Valtrex Pregnancy And Lactation Text: this medication is Pregnancy Category B and is considered safe during pregnancy. This medication is not directly found in breast milk but it's metabolite acyclovir is present.
5-Fu Counseling: 5-Fluorouracil Counseling:  I discussed with the patient the risks of 5-fluorouracil including but not limited to erythema, scaling, itching, weeping, crusting, and pain.
Acitretin Counseling:  I discussed with the patient the risks of acitretin including but not limited to hair loss, dry lips/skin/eyes, liver damage, hyperlipidemia, depression/suicidal ideation, photosensitivity.  Serious rare side effects can include but are not limited to pancreatitis, pseudotumor cerebri, bony changes, clot formation/stroke/heart attack.  Patient understands that alcohol is contraindicated since it can result in liver toxicity and significantly prolong the elimination of the drug by many years.
Drysol Counseling:  I discussed with the patient the risks of drysol/aluminum chloride including but not limited to skin rash, itching, irritation, burning.
Albendazole Counseling:  I discussed with the patient the risks of albendazole including but not limited to cytopenia, kidney damage, nausea/vomiting and severe allergy.  The patient understands that this medication is being used in an off-label manner.
Fluconazole Counseling:  Patient counseled regarding adverse effects of fluconazole including but not limited to headache, diarrhea, nausea, upset stomach, liver function test abnormalities, taste disturbance, and stomach pain.  There is a rare possibility of liver failure that can occur when taking fluconazole.  The patient understands that monitoring of LFTs and kidney function test may be required, especially at baseline. The patient verbalized understanding of the proper use and possible adverse effects of fluconazole.  All of the patient's questions and concerns were addressed.
Wartpeel Counseling:  I discussed with the patient the risks of Wartpeel including but not limited to erythema, scaling, itching, weeping, crusting, and pain.
Finasteride Male Counseling: Finasteride Counseling:  I discussed with the patient the risks of use of finasteride including but not limited to decreased libido, decreased ejaculate volume, gynecomastia, and depression. Women should not handle medication.  All of the patient's questions and concerns were addressed.
Otezla Pregnancy And Lactation Text: This medication is Pregnancy Category C and it isn't known if it is safe during pregnancy. It is unknown if it is excreted in breast milk.
Bactrim Counseling:  I discussed with the patient the risks of sulfa antibiotics including but not limited to GI upset, allergic reaction, drug rash, diarrhea, dizziness, photosensitivity, and yeast infections.  Rarely, more serious reactions can occur including but not limited to aplastic anemia, agranulocytosis, methemoglobinemia, blood dyscrasias, liver or kidney failure, lung infiltrates or desquamative/blistering drug rashes.
Protopic Pregnancy And Lactation Text: This medication is Pregnancy Category C. It is unknown if this medication is excreted in breast milk when applied topically.
Cephalexin Counseling: I counseled the patient regarding use of cephalexin as an antibiotic for prophylactic and/or therapeutic purposes. Cephalexin (commonly prescribed under brand name Keflex) is a cephalosporin antibiotic which is active against numerous classes of bacteria, including most skin bacteria. Side effects may include nausea, diarrhea, gastrointestinal upset, rash, hives, yeast infections, and in rare cases, hepatitis, kidney disease, seizures, fever, confusion, neurologic symptoms, and others. Patients with severe allergies to penicillin medications are cautioned that there is about a 10% incidence of cross-reactivity with cephalosporins. When possible, patients with penicillin allergies should use alternatives to cephalosporins for antibiotic therapy.
Infliximab Counseling:  I discussed with the patient the risks of infliximab including but not limited to myelosuppression, immunosuppression, autoimmune hepatitis, demyelinating diseases, lymphoma, and serious infections.  The patient understands that monitoring is required including a PPD at baseline and must alert us or the primary physician if symptoms of infection or other concerning signs are noted.
Xolair Counseling:  Patient informed of potential adverse effects including but not limited to fever, muscle aches, rash and allergic reactions.  The patient verbalized understanding of the proper use and possible adverse effects of Xolair.  All of the patient's questions and concerns were addressed.
Finasteride Pregnancy And Lactation Text: This medication is absolutely contraindicated during pregnancy. It is unknown if it is excreted in breast milk.
Acitretin Pregnancy And Lactation Text: This medication is Pregnancy Category X and should not be given to women who are pregnant or may become pregnant in the future. This medication is excreted in breast milk.
Drysol Pregnancy And Lactation Text: This medication is considered safe during pregnancy and breast feeding.
Xelwatsnoz Pregnancy And Lactation Text: This medication is Pregnancy Category D and is not considered safe during pregnancy.  The risk during breast feeding is also uncertain.
Rhofade Counseling: Rhofade is a topical medication which can decrease superficial blood flow where applied. Side effects are uncommon and include stinging, redness and allergic reactions.
Oxybutynin Counseling:  I discussed with the patient the risks of oxybutynin including but not limited to skin rash, drowsiness, dry mouth, difficulty urinating, and blurred vision.
Bactrim Pregnancy And Lactation Text: This medication is Pregnancy Category D and is known to cause fetal risk.  It is also excreted in breast milk.

## 2022-01-28 NOTE — PROCEDURE: INJECTION
Render J-Code Information In Note?: no
Dose Administered (Numbers Only - Mg, G, Mcg, Units, Cc): 0
Units: mg
Post-Care Instructions: I reviewed with the patient in detail post-care instructions. Patient understands to keep the injection sites clean and call the clinic if there is any redness, swelling or pain.
Procedure Information: Please note that the numeric value listed in the Medication (1) and associated J-code units and Medication (2) and associated J-code units variables are j-code amounts and do not represent either the concentration or the total amount of the medications injected.  I strongly recommend selecting no to the Render J-code information in note question. This will allow your note to be more clear. If you are billing j-codes with your injection codes you need to document the total amount of the medication injected. This amount should match the j-code units. For example, if you are injecting Triamcinolone 40mg as an intramuscular injection you would select 40 for the dose field and mg for the units. This would allow you to document  with 4 units (40mg = 10mg x 4). The total volume is not used to calculate j-codes only the amount of the medication administered.
Lot # (Optional): [id number]
Treatment Number: 2
Administered By (Optional): Blanca Goldsmith
Medication (1) And Associated J-Code Units: Bleomycin, 15 units
Consent: The risks of the medication was reviewed with the patient.
Expiration Date (Optional): 01/23
Units: cc
Total Volume Injected In Cc (Will Not Affected Billing): 0.3
Route: IL
Detail Level: None

## 2022-01-28 NOTE — PROCEDURE: PATIENT SPECIFIC COUNSELING
Detail Level: Detailed
Discussed using Aldara cream, can C&D the area or inject with Bleo twice and can follow up with the Aldara cream. Informed patient that if all these treatment options don’t work, surgery is the next option.
100

## 2022-04-11 ENCOUNTER — PATIENT MESSAGE (OUTPATIENT)
Dept: MEDICAL GROUP | Facility: MEDICAL CENTER | Age: 52
End: 2022-04-11
Payer: COMMERCIAL

## 2022-04-12 PROBLEM — C44.91 BASAL CELL CARCINOMA: Status: ACTIVE | Noted: 2019-08-30

## 2022-05-02 ENCOUNTER — HOSPITAL ENCOUNTER (OUTPATIENT)
Dept: LAB | Facility: MEDICAL CENTER | Age: 52
End: 2022-05-02
Attending: INTERNAL MEDICINE
Payer: COMMERCIAL

## 2022-05-02 ENCOUNTER — HOSPITAL ENCOUNTER (OUTPATIENT)
Dept: RADIOLOGY | Facility: MEDICAL CENTER | Age: 52
End: 2022-05-02

## 2022-05-02 DIAGNOSIS — R10.10 UPPER ABDOMINAL PAIN: ICD-10-CM

## 2022-05-02 DIAGNOSIS — Z78.0 POSTMENOPAUSAL: ICD-10-CM

## 2022-05-02 DIAGNOSIS — E66.9 OBESITY (BMI 30-39.9): ICD-10-CM

## 2022-05-02 LAB
25(OH)D3 SERPL-MCNC: 37 NG/ML (ref 30–100)
ALBUMIN SERPL BCP-MCNC: 4.5 G/DL (ref 3.2–4.9)
ALBUMIN/GLOB SERPL: 1.6 G/DL
ALP SERPL-CCNC: 74 U/L (ref 30–99)
ALT SERPL-CCNC: 25 U/L (ref 2–50)
ANION GAP SERPL CALC-SCNC: 11 MMOL/L (ref 7–16)
AST SERPL-CCNC: 23 U/L (ref 12–45)
BILIRUB SERPL-MCNC: 0.4 MG/DL (ref 0.1–1.5)
BUN SERPL-MCNC: 13 MG/DL (ref 8–22)
CALCIUM SERPL-MCNC: 9.3 MG/DL (ref 8.5–10.5)
CHLORIDE SERPL-SCNC: 103 MMOL/L (ref 96–112)
CHOLEST SERPL-MCNC: 274 MG/DL (ref 100–199)
CO2 SERPL-SCNC: 25 MMOL/L (ref 20–33)
CREAT SERPL-MCNC: 0.55 MG/DL (ref 0.5–1.4)
EST. AVERAGE GLUCOSE BLD GHB EST-MCNC: 120 MG/DL
GFR SERPLBLD CREATININE-BSD FMLA CKD-EPI: 110 ML/MIN/1.73 M 2
GLOBULIN SER CALC-MCNC: 2.8 G/DL (ref 1.9–3.5)
GLUCOSE SERPL-MCNC: 79 MG/DL (ref 65–99)
HBA1C MFR BLD: 5.8 % (ref 4–5.6)
HDLC SERPL-MCNC: 50 MG/DL
LDLC SERPL CALC-MCNC: 194 MG/DL
POTASSIUM SERPL-SCNC: 4.2 MMOL/L (ref 3.6–5.5)
PROT SERPL-MCNC: 7.3 G/DL (ref 6–8.2)
SODIUM SERPL-SCNC: 139 MMOL/L (ref 135–145)
TRIGL SERPL-MCNC: 152 MG/DL (ref 0–149)

## 2022-05-02 PROCEDURE — 83036 HEMOGLOBIN GLYCOSYLATED A1C: CPT

## 2022-05-02 PROCEDURE — 36415 COLL VENOUS BLD VENIPUNCTURE: CPT

## 2022-05-02 PROCEDURE — 82306 VITAMIN D 25 HYDROXY: CPT

## 2022-05-02 PROCEDURE — 80053 COMPREHEN METABOLIC PANEL: CPT

## 2022-05-02 PROCEDURE — 80061 LIPID PANEL: CPT

## 2022-05-03 ENCOUNTER — PATIENT MESSAGE (OUTPATIENT)
Dept: MEDICAL GROUP | Facility: MEDICAL CENTER | Age: 52
End: 2022-05-03
Payer: COMMERCIAL

## 2022-05-03 DIAGNOSIS — E04.1 THYROID NODULE: ICD-10-CM

## 2022-05-03 DIAGNOSIS — E78.2 MIXED HYPERLIPIDEMIA: ICD-10-CM

## 2022-05-03 DIAGNOSIS — R73.03 PREDIABETES: ICD-10-CM

## 2022-05-06 ENCOUNTER — APPOINTMENT (RX ONLY)
Dept: URBAN - METROPOLITAN AREA CLINIC 4 | Facility: CLINIC | Age: 52
Setting detail: DERMATOLOGY
End: 2022-05-06

## 2022-05-06 DIAGNOSIS — D22 MELANOCYTIC NEVI: ICD-10-CM

## 2022-05-06 DIAGNOSIS — Z85.828 PERSONAL HISTORY OF OTHER MALIGNANT NEOPLASM OF SKIN: ICD-10-CM

## 2022-05-06 PROBLEM — D22.39 MELANOCYTIC NEVI OF OTHER PARTS OF FACE: Status: ACTIVE | Noted: 2022-05-06

## 2022-05-06 PROCEDURE — ? OBSERVATION

## 2022-05-06 PROCEDURE — 99212 OFFICE O/P EST SF 10 MIN: CPT

## 2022-05-06 PROCEDURE — ? COUNSELING

## 2022-05-06 ASSESSMENT — LOCATION SIMPLE DESCRIPTION DERM
LOCATION SIMPLE: RIGHT CHEEK
LOCATION SIMPLE: RIGHT NOSE
LOCATION SIMPLE: LEFT CHEEK

## 2022-05-06 ASSESSMENT — LOCATION ZONE DERM
LOCATION ZONE: FACE
LOCATION ZONE: NOSE

## 2022-05-06 ASSESSMENT — LOCATION DETAILED DESCRIPTION DERM
LOCATION DETAILED: RIGHT NASAL SIDEWALL
LOCATION DETAILED: RIGHT INFERIOR MEDIAL MALAR CHEEK
LOCATION DETAILED: LEFT INFERIOR MEDIAL MALAR CHEEK

## 2022-05-13 ENCOUNTER — HOSPITAL ENCOUNTER (OUTPATIENT)
Dept: LAB | Facility: MEDICAL CENTER | Age: 52
End: 2022-05-13
Attending: INTERNAL MEDICINE
Payer: COMMERCIAL

## 2022-05-13 DIAGNOSIS — E04.1 THYROID NODULE: ICD-10-CM

## 2022-05-13 LAB — TSH SERPL DL<=0.005 MIU/L-ACNC: 3.34 UIU/ML (ref 0.38–5.33)

## 2022-05-13 PROCEDURE — 36415 COLL VENOUS BLD VENIPUNCTURE: CPT

## 2022-05-13 PROCEDURE — 84443 ASSAY THYROID STIM HORMONE: CPT

## 2022-05-31 DIAGNOSIS — Z12.31 SCREENING MAMMOGRAM, ENCOUNTER FOR: ICD-10-CM

## 2022-05-31 PROBLEM — K80.20 GALLSTONES: Status: ACTIVE | Noted: 2022-05-31

## 2022-05-31 PROBLEM — K76.0 FATTY LIVER DISEASE, NONALCOHOLIC: Status: ACTIVE | Noted: 2022-05-31

## 2022-06-03 ENCOUNTER — OFFICE VISIT (OUTPATIENT)
Dept: MEDICAL GROUP | Facility: MEDICAL CENTER | Age: 52
End: 2022-06-03
Attending: INTERNAL MEDICINE
Payer: COMMERCIAL

## 2022-06-03 VITALS
DIASTOLIC BLOOD PRESSURE: 78 MMHG | RESPIRATION RATE: 16 BRPM | HEART RATE: 96 BPM | SYSTOLIC BLOOD PRESSURE: 118 MMHG | HEIGHT: 62 IN | OXYGEN SATURATION: 94 % | WEIGHT: 185 LBS | TEMPERATURE: 97.3 F | BODY MASS INDEX: 34.04 KG/M2

## 2022-06-03 DIAGNOSIS — R06.83 SNORING: ICD-10-CM

## 2022-06-03 DIAGNOSIS — E78.2 MIXED HYPERLIPIDEMIA: ICD-10-CM

## 2022-06-03 DIAGNOSIS — E66.9 OBESITY (BMI 30-39.9): ICD-10-CM

## 2022-06-03 DIAGNOSIS — K80.20 SYMPTOMATIC CHOLELITHIASIS: ICD-10-CM

## 2022-06-03 PROBLEM — R21 RASH: Status: RESOLVED | Noted: 2019-08-30 | Resolved: 2022-06-03

## 2022-06-03 PROCEDURE — 99212 OFFICE O/P EST SF 10 MIN: CPT | Performed by: INTERNAL MEDICINE

## 2022-06-03 PROCEDURE — 99214 OFFICE O/P EST MOD 30 MIN: CPT | Performed by: INTERNAL MEDICINE

## 2022-06-03 RX ORDER — ATORVASTATIN CALCIUM 20 MG/1
20 TABLET, FILM COATED ORAL NIGHTLY
Qty: 90 TABLET | Refills: 3 | Status: SHIPPED | OUTPATIENT
Start: 2022-06-03 | End: 2023-07-13

## 2022-06-03 ASSESSMENT — PATIENT HEALTH QUESTIONNAIRE - PHQ9: CLINICAL INTERPRETATION OF PHQ2 SCORE: 0

## 2022-06-03 NOTE — ASSESSMENT & PLAN NOTE
Patient's partner who is present at her visit today reports that she has been snoring a lot.  He states that he has witnessed several apneic events.  Patient also reports poor sleep quality but she attributes this to having her dog in her bed and needing to get up frequently to let her dog out.  She reports excessive fatigue during the day.  She has never been screened for sleep apnea.

## 2022-06-03 NOTE — ASSESSMENT & PLAN NOTE
Recent labs showed an LDL of 194.  She has had an LDL close to 200 across several blood studies.  In the past she has been on a cholesterol medication without side effect but stopped it because she wanted to be on less medications.  She is amenable to restarting.  She is also interested in lifestyle modification.

## 2022-06-03 NOTE — PROGRESS NOTES
Subjective:   Sury Vidal is a 52 y.o. female here today for f/u labs and ultrasound    Mixed hyperlipidemia  Recent labs showed an LDL of 194.  She has had an LDL close to 200 across several blood studies.  In the past she has been on a cholesterol medication without side effect but stopped it because she wanted to be on less medications.  She is amenable to restarting.  She is also interested in lifestyle modification.    Symptomatic cholelithiasis  She recently completed an abdominal ultrasound which showed a gallstone in the neck of the gallbladder.  She continues to report intermittent right upper quadrant pain.  It occurs about once a month but when it happens it is quite severe.  This last episode was the most severe and it lasted for almost a day.  It was associated with nausea and vomiting.  She reports that the pain is right under the ribs on the right side and it radiates to her back.  She is interested in consultation with the surgeon to discuss gallbladder removal.    Snoring  Patient's partner who is present at her visit today reports that she has been snoring a lot.  He states that he has witnessed several apneic events.  Patient also reports poor sleep quality but she attributes this to having her dog in her bed and needing to get up frequently to let her dog out.  She reports excessive fatigue during the day.  She has never been screened for sleep apnea.       Current medicines (including changes today)  Current Outpatient Medications   Medication Sig Dispense Refill   • atorvastatin (LIPITOR) 20 MG Tab Take 1 Tablet by mouth every evening. 90 Tablet 3     No current facility-administered medications for this visit.     She  has a past medical history of Anxiety, Depression, Fibroid, GERD (gastroesophageal reflux disease), Hemorrhoids (10/1/2014), High cholesterol, Tachycardia, and Thyroid nodule.         Objective:     Vitals:    06/03/22 0708   BP: 118/78   Pulse: 96   Resp: 16   Temp:  36.3 °C (97.3 °F)   SpO2: 94%     Body mass index is 33.84 kg/m².   Physical Exam:  Constitutional: Alert, no distress.  Skin: Warm, dry, good turgor, no rashes in visible areas.  Eye: Equal, round and reactive, conjunctiva clear, lids normal.  Psych: Alert and oriented x3, normal affect and mood.      Results and Imaging:   Hospital Outpatient Visit on 05/13/2022   Component Date Value Ref Range Status   • TSH 05/13/2022 3.340  0.380 - 5.330 uIU/mL Final    Comment: Reference Range:    Pregnant Females, 1st Trimester  0.050-3.700  Pregnant Females, 2nd Trimester  0.310-4.350  Pregnant Females, 3rd Trimester  0.410-5.180        Latest Reference Range & Units 05/02/22 13:35   Sodium 135 - 145 mmol/L 139   Potassium 3.6 - 5.5 mmol/L 4.2   Chloride 96 - 112 mmol/L 103   Co2 20 - 33 mmol/L 25   Anion Gap 7.0 - 16.0  11.0   Glucose 65 - 99 mg/dL 79   Bun 8 - 22 mg/dL 13   Creatinine 0.50 - 1.40 mg/dL 0.55   GFR (CKD-EPI) >60 mL/min/1.73 m 2 110 [1]   Calcium 8.5 - 10.5 mg/dL 9.3   AST(SGOT) 12 - 45 U/L 23   ALT(SGPT) 2 - 50 U/L 25   Alkaline Phosphatase 30 - 99 U/L 74   Total Bilirubin 0.1 - 1.5 mg/dL 0.4   Albumin 3.2 - 4.9 g/dL 4.5   Total Protein 6.0 - 8.2 g/dL 7.3   Globulin 1.9 - 3.5 g/dL 2.8   A-G Ratio g/dL 1.6   Glycohemoglobin 4.0 - 5.6 % 5.8 (H) [2]   Estim. Avg Glu mg/dL 120 [3]   Cholesterol,Tot 100 - 199 mg/dL 274 (H)   Triglycerides 0 - 149 mg/dL 152 (H)   HDL >=40 mg/dL 50   LDL <100 mg/dL 194 (H)   25-Hydroxy   Vitamin D 25 30 - 100 ng/mL 37 [4]     US abdomen:   Reviewed imaging scanned into media.  Shows hepatomegaly with steatosis and gallstones in the gallbladder neck measuring up to 13 mm  Assessment and Plan:   The following treatment plan was discussed    1. Mixed hyperlipidemia  We will start on atorvastatin as below and obtain updated labs in 2 to 3 months.  - atorvastatin (LIPITOR) 20 MG Tab; Take 1 Tablet by mouth every evening.  Dispense: 90 Tablet; Refill: 3  - Lipid Profile; Future    2.  Symptomatic cholelithiasis  She would benefit from evaluation for cholecystectomy.  Referral to surgery placed.  - Referral to General Surgery    3. Snoring  High suspicion for underlying sleep apnea.  We discussed importance of diagnosis and treatment of sleep apnea to prevent pulmonary and cardiovascular disease.  I have placed a referral to sleep medicine.  - Referral to Pulmonary and Sleep Medicine    4. Obesity (BMI 30-39.9)  - Referral to Pulmonary and Sleep Medicine        Followup: Return if symptoms worsen or fail to improve.

## 2022-06-03 NOTE — ASSESSMENT & PLAN NOTE
She recently completed an abdominal ultrasound which showed a gallstone in the neck of the gallbladder.  She continues to report intermittent right upper quadrant pain.  It occurs about once a month but when it happens it is quite severe.  This last episode was the most severe and it lasted for almost a day.  It was associated with nausea and vomiting.  She reports that the pain is right under the ribs on the right side and it radiates to her back.  She is interested in consultation with the surgeon to discuss gallbladder removal.

## 2022-06-18 ENCOUNTER — OFFICE VISIT (OUTPATIENT)
Dept: URGENT CARE | Facility: CLINIC | Age: 52
End: 2022-06-18
Payer: COMMERCIAL

## 2022-06-18 VITALS
TEMPERATURE: 97 F | RESPIRATION RATE: 16 BRPM | WEIGHT: 185.6 LBS | BODY MASS INDEX: 34.16 KG/M2 | DIASTOLIC BLOOD PRESSURE: 78 MMHG | OXYGEN SATURATION: 97 % | HEART RATE: 95 BPM | SYSTOLIC BLOOD PRESSURE: 120 MMHG | HEIGHT: 62 IN

## 2022-06-18 DIAGNOSIS — H00.035 CELLULITIS OF LEFT LOWER EYELID: ICD-10-CM

## 2022-06-18 DIAGNOSIS — H57.89 EYE SWELLING, BILATERAL: ICD-10-CM

## 2022-06-18 DIAGNOSIS — H00.032 CELLULITIS OF RIGHT LOWER EYELID: ICD-10-CM

## 2022-06-18 PROCEDURE — 99213 OFFICE O/P EST LOW 20 MIN: CPT | Performed by: PHYSICIAN ASSISTANT

## 2022-06-18 RX ORDER — SULFAMETHOXAZOLE AND TRIMETHOPRIM 800; 160 MG/1; MG/1
1 TABLET ORAL 2 TIMES DAILY
Qty: 10 TABLET | Refills: 0 | Status: SHIPPED | OUTPATIENT
Start: 2022-06-18 | End: 2022-06-23

## 2022-06-18 RX ORDER — ERYTHROMYCIN 5 MG/G
OINTMENT OPHTHALMIC
Qty: 3.5 G | Refills: 0 | Status: SHIPPED
Start: 2022-06-18 | End: 2022-08-12

## 2022-06-19 ASSESSMENT — ENCOUNTER SYMPTOMS
CHILLS: 0
EYE REDNESS: 1
HEADACHES: 0
DIZZINESS: 0
NAUSEA: 0
FEVER: 0
EYE PAIN: 1
EYE DISCHARGE: 1
VOMITING: 0

## 2022-06-20 NOTE — PROGRESS NOTES
Subjective     Sury Vidal is a 52 y.o. female who presents with Eye Pain (Eyelid swollen ( Both ) x 1.5 weeks )    HPI:  Sury Vidal is a 52 y.o. female who presents today for evaluation of bilateral eye discomfort/eyelid swelling.  Patient reports that 1.5 weeks ago she started to notice some redness and swelling of her right lower eyelid as well as some mild discharge from that eye.  She was applying compresses and it seemed as though it was clearing up a little bit and then she started noticing it on her left lower eyelid a few days ago.  Left lower eyelid started to get red on the lateral aspect and there was swelling all the way down towards her left cheek.  She also started to get some crusting eye discharge again.  She was using refresh eyedrops which provided moderate relief of irritation but she is concerned for possibility of infection or allergic response.  She does have fairly persistent itching of her eyes.  Denies use of any new lotions, soaps, detergents.  Does not wear eye make-up or contact lenses.      Review of Systems   Constitutional: Negative for chills and fever.   Eyes: Positive for pain, discharge and redness.   Gastrointestinal: Negative for nausea and vomiting.   Neurological: Negative for dizziness and headaches.           PMH:  has a past medical history of Anxiety, Depression, Fibroid, GERD (gastroesophageal reflux disease), Hemorrhoids (10/1/2014), High cholesterol, Tachycardia, and Thyroid nodule.  MEDS:   Current Outpatient Medications:   •  sulfamethoxazole-trimethoprim (BACTRIM DS) 800-160 MG tablet, Take 1 Tablet by mouth 2 times a day for 5 days., Disp: 10 Tablet, Rfl: 0  •  erythromycin 5 MG/GM Ointment, Instill ~1 cm ribbon into affected eye(s) up to 6 times daily, Disp: 3.5 g, Rfl: 0  •  atorvastatin (LIPITOR) 20 MG Tab, Take 1 Tablet by mouth every evening. (Patient not taking: Reported on 6/18/2022), Disp: 90 Tablet, Rfl: 3  ALLERGIES:   Allergies   Allergen  "Reactions   • Codeine      Pt states that she is sensative     SURGHX:   Past Surgical History:   Procedure Laterality Date   • BARTHOLIN GLAND EXCISION     • SALPINGO-OOPHORECTOMY, UNILATERAL      dermoid cyst, age 19     SOCHX:  reports that she quit smoking about 10 years ago. Her smoking use included cigarettes. She has a 1.25 pack-year smoking history. She has never used smokeless tobacco. She reports that she does not drink alcohol and does not use drugs.  FH: Family history was reviewed, no pertinent findings to report      Objective     /78   Pulse 95   Temp 36.1 °C (97 °F) (Temporal)   Resp 16   Ht 1.575 m (5' 2\")   Wt 84.2 kg (185 lb 9.6 oz)   SpO2 97%   BMI 33.95 kg/m²      Physical Exam  Constitutional:       Appearance: She is well-developed.   HENT:      Head: Normocephalic and atraumatic.      Right Ear: External ear normal.      Left Ear: External ear normal.   Eyes:      Extraocular Movements: Extraocular movements intact.      Conjunctiva/sclera: Conjunctivae normal.      Pupils: Pupils are equal, round, and reactive to light.     Cardiovascular:      Rate and Rhythm: Normal rate and regular rhythm.      Heart sounds: Normal heart sounds. No murmur heard.  Pulmonary:      Effort: Pulmonary effort is normal.      Breath sounds: Normal breath sounds. No wheezing.   Musculoskeletal:      Cervical back: Normal range of motion.   Lymphadenopathy:      Cervical: No cervical adenopathy.   Skin:     General: Skin is warm and dry.      Capillary Refill: Capillary refill takes less than 2 seconds.   Neurological:      Mental Status: She is alert and oriented to person, place, and time.   Psychiatric:         Behavior: Behavior normal.         Judgment: Judgment normal.             Assessment & Plan     1. Cellulitis of left lower eyelid  - sulfamethoxazole-trimethoprim (BACTRIM DS) 800-160 MG tablet; Take 1 Tablet by mouth 2 times a day for 5 days.  Dispense: 10 Tablet; Refill: 0  - erythromycin " 5 MG/GM Ointment; Instill ~1 cm ribbon into affected eye(s) up to 6 times daily  Dispense: 3.5 g; Refill: 0    2. Cellulitis of right lower eyelid  - sulfamethoxazole-trimethoprim (BACTRIM DS) 800-160 MG tablet; Take 1 Tablet by mouth 2 times a day for 5 days.  Dispense: 10 Tablet; Refill: 0  - erythromycin 5 MG/GM Ointment; Instill ~1 cm ribbon into affected eye(s) up to 6 times daily  Dispense: 3.5 g; Refill: 0    3. Eye swelling, bilateral  - sulfamethoxazole-trimethoprim (BACTRIM DS) 800-160 MG tablet; Take 1 Tablet by mouth 2 times a day for 5 days.  Dispense: 10 Tablet; Refill: 0  - erythromycin 5 MG/GM Ointment; Instill ~1 cm ribbon into affected eye(s) up to 6 times daily  Dispense: 3.5 g; Refill: 0    Clinical picture on today's exam is most consistent with possibility of cellulitis.  Possible stye formation on right lower eyelid as well.  We will treat with both oral antibiotics and antibiotic ointment.  Discussed with patient, however, that if she does not have significant improvement in symptoms towards the end of her antibiotic regimen that she should contact me and we may need to discuss if we need to change our approach and treat for potential allergic response instead.            Differential Diagnosis, natural history, and supportive care discussed. Return to the Urgent Care or follow up with your PCP if symptoms fail to resolve, or for any new or worsening symptoms. Emergency room precautions discussed. Patient and/or family appears understanding of information.

## 2022-08-12 ENCOUNTER — ANESTHESIA (OUTPATIENT)
Dept: SURGERY | Facility: MEDICAL CENTER | Age: 52
End: 2022-08-12
Payer: COMMERCIAL

## 2022-08-12 ENCOUNTER — APPOINTMENT (OUTPATIENT)
Dept: RADIOLOGY | Facility: MEDICAL CENTER | Age: 52
End: 2022-08-12
Attending: EMERGENCY MEDICINE
Payer: COMMERCIAL

## 2022-08-12 ENCOUNTER — ANESTHESIA EVENT (OUTPATIENT)
Dept: SURGERY | Facility: MEDICAL CENTER | Age: 52
End: 2022-08-12
Payer: COMMERCIAL

## 2022-08-12 ENCOUNTER — HOSPITAL ENCOUNTER (OUTPATIENT)
Facility: MEDICAL CENTER | Age: 52
End: 2022-08-13
Attending: EMERGENCY MEDICINE | Admitting: SURGERY
Payer: COMMERCIAL

## 2022-08-12 DIAGNOSIS — K80.00 CALCULUS OF GALLBLADDER WITH ACUTE CHOLECYSTITIS WITHOUT OBSTRUCTION: ICD-10-CM

## 2022-08-12 PROBLEM — K81.0 ACUTE CHOLECYSTITIS: Status: ACTIVE | Noted: 2022-08-12

## 2022-08-12 LAB
ABO + RH BLD: NORMAL
ABO GROUP BLD: NORMAL
ALBUMIN SERPL BCP-MCNC: 4.6 G/DL (ref 3.2–4.9)
ALBUMIN/GLOB SERPL: 1.6 G/DL
ALP SERPL-CCNC: 89 U/L (ref 30–99)
ALT SERPL-CCNC: 30 U/L (ref 2–50)
ANION GAP SERPL CALC-SCNC: 13 MMOL/L (ref 7–16)
APPEARANCE UR: CLEAR
AST SERPL-CCNC: 24 U/L (ref 12–45)
BASOPHILS # BLD AUTO: 0.3 % (ref 0–1.8)
BASOPHILS # BLD: 0.03 K/UL (ref 0–0.12)
BILIRUB SERPL-MCNC: 0.3 MG/DL (ref 0.1–1.5)
BILIRUB UR QL STRIP.AUTO: NEGATIVE
BLD GP AB SCN SERPL QL: NORMAL
BUN SERPL-MCNC: 10 MG/DL (ref 8–22)
CALCIUM SERPL-MCNC: 9 MG/DL (ref 8.5–10.5)
CFT BLD TEG: 4.5 MIN (ref 4.6–9.1)
CFT P HPASE BLD TEG: 4.5 MIN (ref 4.3–8.3)
CHLORIDE SERPL-SCNC: 98 MMOL/L (ref 96–112)
CLOT ANGLE BLD TEG: 72 DEGREES (ref 63–78)
CLOT LYSIS 30M P MA LENFR BLD TEG: 0.6 % (ref 0–2.6)
CO2 SERPL-SCNC: 22 MMOL/L (ref 20–33)
COLOR UR: YELLOW
CREAT SERPL-MCNC: 0.45 MG/DL (ref 0.5–1.4)
CT.EXTRINSIC BLD ROTEM: 1.3 MIN (ref 0.8–2.1)
EKG IMPRESSION: NORMAL
EOSINOPHIL # BLD AUTO: 0.02 K/UL (ref 0–0.51)
EOSINOPHIL NFR BLD: 0.2 % (ref 0–6.9)
ERYTHROCYTE [DISTWIDTH] IN BLOOD BY AUTOMATED COUNT: 39.8 FL (ref 35.9–50)
GFR SERPLBLD CREATININE-BSD FMLA CKD-EPI: 115 ML/MIN/1.73 M 2
GLOBULIN SER CALC-MCNC: 2.9 G/DL (ref 1.9–3.5)
GLUCOSE SERPL-MCNC: 145 MG/DL (ref 65–99)
GLUCOSE UR STRIP.AUTO-MCNC: 100 MG/DL
HCT VFR BLD AUTO: 40.5 % (ref 37–47)
HGB BLD-MCNC: 13.8 G/DL (ref 12–16)
IMM GRANULOCYTES # BLD AUTO: 0.07 K/UL (ref 0–0.11)
IMM GRANULOCYTES NFR BLD AUTO: 0.8 % (ref 0–0.9)
KETONES UR STRIP.AUTO-MCNC: NEGATIVE MG/DL
LEUKOCYTE ESTERASE UR QL STRIP.AUTO: NEGATIVE
LIPASE SERPL-CCNC: 30 U/L (ref 11–82)
LYMPHOCYTES # BLD AUTO: 1.64 K/UL (ref 1–4.8)
LYMPHOCYTES NFR BLD: 18.4 % (ref 22–41)
MCF BLD TEG: 60.5 MM (ref 52–69)
MCF.PLATELET INHIB BLD ROTEM: 20.3 MM (ref 15–32)
MCH RBC QN AUTO: 28.8 PG (ref 27–33)
MCHC RBC AUTO-ENTMCNC: 34.1 G/DL (ref 33.6–35)
MCV RBC AUTO: 84.6 FL (ref 81.4–97.8)
MICRO URNS: ABNORMAL
MONOCYTES # BLD AUTO: 0.3 K/UL (ref 0–0.85)
MONOCYTES NFR BLD AUTO: 3.4 % (ref 0–13.4)
NEUTROPHILS # BLD AUTO: 6.83 K/UL (ref 2–7.15)
NEUTROPHILS NFR BLD: 76.9 % (ref 44–72)
NITRITE UR QL STRIP.AUTO: NEGATIVE
NRBC # BLD AUTO: 0 K/UL
NRBC BLD-RTO: 0 /100 WBC
PA AA BLD-ACNC: 82.7 % (ref 0–11)
PA ADP BLD-ACNC: 42.2 % (ref 0–17)
PH UR STRIP.AUTO: 8 [PH] (ref 5–8)
PLATELET # BLD AUTO: 277 K/UL (ref 164–446)
PMV BLD AUTO: 10.5 FL (ref 9–12.9)
POTASSIUM SERPL-SCNC: 3.7 MMOL/L (ref 3.6–5.5)
PROT SERPL-MCNC: 7.5 G/DL (ref 6–8.2)
PROT UR QL STRIP: NEGATIVE MG/DL
RBC # BLD AUTO: 4.79 M/UL (ref 4.2–5.4)
RBC UR QL AUTO: NEGATIVE
RH BLD: NORMAL
SODIUM SERPL-SCNC: 133 MMOL/L (ref 135–145)
SP GR UR STRIP.AUTO: 1.02
TEG ALGORITHM TGALG: ABNORMAL
UROBILINOGEN UR STRIP.AUTO-MCNC: 0.2 MG/DL
WBC # BLD AUTO: 8.9 K/UL (ref 4.8–10.8)

## 2022-08-12 PROCEDURE — 700102 HCHG RX REV CODE 250 W/ 637 OVERRIDE(OP): Performed by: SURGERY

## 2022-08-12 PROCEDURE — 86850 RBC ANTIBODY SCREEN: CPT

## 2022-08-12 PROCEDURE — 85576 BLOOD PLATELET AGGREGATION: CPT

## 2022-08-12 PROCEDURE — 85025 COMPLETE CBC W/AUTO DIFF WBC: CPT

## 2022-08-12 PROCEDURE — G0378 HOSPITAL OBSERVATION PER HR: HCPCS

## 2022-08-12 PROCEDURE — 700101 HCHG RX REV CODE 250: Performed by: ANESTHESIOLOGY

## 2022-08-12 PROCEDURE — 700101 HCHG RX REV CODE 250: Performed by: SURGERY

## 2022-08-12 PROCEDURE — 99291 CRITICAL CARE FIRST HOUR: CPT

## 2022-08-12 PROCEDURE — 93005 ELECTROCARDIOGRAM TRACING: CPT | Performed by: SURGERY

## 2022-08-12 PROCEDURE — 76705 ECHO EXAM OF ABDOMEN: CPT

## 2022-08-12 PROCEDURE — 96376 TX/PRO/DX INJ SAME DRUG ADON: CPT

## 2022-08-12 PROCEDURE — 160035 HCHG PACU - 1ST 60 MINS PHASE I: Performed by: SURGERY

## 2022-08-12 PROCEDURE — 160002 HCHG RECOVERY MINUTES (STAT): Performed by: SURGERY

## 2022-08-12 PROCEDURE — 85384 FIBRINOGEN ACTIVITY: CPT

## 2022-08-12 PROCEDURE — 700105 HCHG RX REV CODE 258: Performed by: SURGERY

## 2022-08-12 PROCEDURE — 88307 TISSUE EXAM BY PATHOLOGIST: CPT

## 2022-08-12 PROCEDURE — 700111 HCHG RX REV CODE 636 W/ 250 OVERRIDE (IP): Performed by: EMERGENCY MEDICINE

## 2022-08-12 PROCEDURE — 700105 HCHG RX REV CODE 258: Performed by: ANESTHESIOLOGY

## 2022-08-12 PROCEDURE — 96375 TX/PRO/DX INJ NEW DRUG ADDON: CPT

## 2022-08-12 PROCEDURE — 160029 HCHG SURGERY MINUTES - 1ST 30 MINS LEVEL 4: Performed by: SURGERY

## 2022-08-12 PROCEDURE — 00790 ANES IPER UPR ABD NOS: CPT | Performed by: ANESTHESIOLOGY

## 2022-08-12 PROCEDURE — 86900 BLOOD TYPING SEROLOGIC ABO: CPT

## 2022-08-12 PROCEDURE — A9270 NON-COVERED ITEM OR SERVICE: HCPCS | Performed by: SURGERY

## 2022-08-12 PROCEDURE — 700111 HCHG RX REV CODE 636 W/ 250 OVERRIDE (IP): Performed by: ANESTHESIOLOGY

## 2022-08-12 PROCEDURE — 80053 COMPREHEN METABOLIC PANEL: CPT

## 2022-08-12 PROCEDURE — 160048 HCHG OR STATISTICAL LEVEL 1-5: Performed by: SURGERY

## 2022-08-12 PROCEDURE — 96374 THER/PROPH/DIAG INJ IV PUSH: CPT

## 2022-08-12 PROCEDURE — 81003 URINALYSIS AUTO W/O SCOPE: CPT

## 2022-08-12 PROCEDURE — 47562 LAPAROSCOPIC CHOLECYSTECTOMY: CPT | Mod: 22 | Performed by: SURGERY

## 2022-08-12 PROCEDURE — 700111 HCHG RX REV CODE 636 W/ 250 OVERRIDE (IP): Performed by: SURGERY

## 2022-08-12 PROCEDURE — 160036 HCHG PACU - EA ADDL 30 MINS PHASE I: Performed by: SURGERY

## 2022-08-12 PROCEDURE — 99222 1ST HOSP IP/OBS MODERATE 55: CPT | Mod: 57 | Performed by: SURGERY

## 2022-08-12 PROCEDURE — 86901 BLOOD TYPING SEROLOGIC RH(D): CPT

## 2022-08-12 PROCEDURE — 700105 HCHG RX REV CODE 258: Performed by: EMERGENCY MEDICINE

## 2022-08-12 PROCEDURE — 160009 HCHG ANES TIME/MIN: Performed by: SURGERY

## 2022-08-12 PROCEDURE — 47562 LAPAROSCOPIC CHOLECYSTECTOMY: CPT | Mod: AS | Performed by: NURSE PRACTITIONER

## 2022-08-12 PROCEDURE — 85347 COAGULATION TIME ACTIVATED: CPT

## 2022-08-12 PROCEDURE — 160041 HCHG SURGERY MINUTES - EA ADDL 1 MIN LEVEL 4: Performed by: SURGERY

## 2022-08-12 PROCEDURE — 36415 COLL VENOUS BLD VENIPUNCTURE: CPT

## 2022-08-12 PROCEDURE — 83690 ASSAY OF LIPASE: CPT

## 2022-08-12 PROCEDURE — 88313 SPECIAL STAINS GROUP 2: CPT | Mod: 91

## 2022-08-12 PROCEDURE — 88304 TISSUE EXAM BY PATHOLOGIST: CPT

## 2022-08-12 RX ORDER — HYDRALAZINE HYDROCHLORIDE 20 MG/ML
5 INJECTION INTRAMUSCULAR; INTRAVENOUS
Status: DISCONTINUED | OUTPATIENT
Start: 2022-08-12 | End: 2022-08-12 | Stop reason: HOSPADM

## 2022-08-12 RX ORDER — OXYCODONE HCL 5 MG/5 ML
5 SOLUTION, ORAL ORAL
Status: DISCONTINUED | OUTPATIENT
Start: 2022-08-12 | End: 2022-08-12 | Stop reason: HOSPADM

## 2022-08-12 RX ORDER — FEXOFENADINE HCL 180 MG/1
180 TABLET ORAL
COMMUNITY

## 2022-08-12 RX ORDER — BUPIVACAINE HYDROCHLORIDE AND EPINEPHRINE 5; 5 MG/ML; UG/ML
INJECTION, SOLUTION EPIDURAL; INTRACAUDAL; PERINEURAL
Status: DISCONTINUED | OUTPATIENT
Start: 2022-08-12 | End: 2022-08-12 | Stop reason: HOSPADM

## 2022-08-12 RX ORDER — HYDROMORPHONE HYDROCHLORIDE 1 MG/ML
0.2 INJECTION, SOLUTION INTRAMUSCULAR; INTRAVENOUS; SUBCUTANEOUS
Status: DISCONTINUED | OUTPATIENT
Start: 2022-08-12 | End: 2022-08-12 | Stop reason: HOSPADM

## 2022-08-12 RX ORDER — HYDROMORPHONE HYDROCHLORIDE 1 MG/ML
0.1 INJECTION, SOLUTION INTRAMUSCULAR; INTRAVENOUS; SUBCUTANEOUS
Status: DISCONTINUED | OUTPATIENT
Start: 2022-08-12 | End: 2022-08-12 | Stop reason: HOSPADM

## 2022-08-12 RX ORDER — HALOPERIDOL 5 MG/ML
1 INJECTION INTRAMUSCULAR
Status: DISCONTINUED | OUTPATIENT
Start: 2022-08-12 | End: 2022-08-12 | Stop reason: HOSPADM

## 2022-08-12 RX ORDER — LIDOCAINE HYDROCHLORIDE 20 MG/ML
JELLY TOPICAL PRN
Status: DISCONTINUED | OUTPATIENT
Start: 2022-08-12 | End: 2022-08-12 | Stop reason: SURG

## 2022-08-12 RX ORDER — CEFAZOLIN SODIUM 1 G/3ML
INJECTION, POWDER, FOR SOLUTION INTRAMUSCULAR; INTRAVENOUS PRN
Status: DISCONTINUED | OUTPATIENT
Start: 2022-08-12 | End: 2022-08-12 | Stop reason: SURG

## 2022-08-12 RX ORDER — ACETAMINOPHEN 325 MG/1
650 TABLET ORAL EVERY 6 HOURS
Status: DISCONTINUED | OUTPATIENT
Start: 2022-08-12 | End: 2022-08-13 | Stop reason: HOSPADM

## 2022-08-12 RX ORDER — SCOLOPAMINE TRANSDERMAL SYSTEM 1 MG/1
1 PATCH, EXTENDED RELEASE TRANSDERMAL
Status: DISCONTINUED | OUTPATIENT
Start: 2022-08-12 | End: 2022-08-13 | Stop reason: HOSPADM

## 2022-08-12 RX ORDER — HALOPERIDOL 5 MG/ML
1 INJECTION INTRAMUSCULAR EVERY 6 HOURS PRN
Status: DISCONTINUED | OUTPATIENT
Start: 2022-08-12 | End: 2022-08-13 | Stop reason: HOSPADM

## 2022-08-12 RX ORDER — DEXAMETHASONE SODIUM PHOSPHATE 4 MG/ML
INJECTION, SOLUTION INTRA-ARTICULAR; INTRALESIONAL; INTRAMUSCULAR; INTRAVENOUS; SOFT TISSUE PRN
Status: DISCONTINUED | OUTPATIENT
Start: 2022-08-12 | End: 2022-08-12 | Stop reason: SURG

## 2022-08-12 RX ORDER — HYDROMORPHONE HYDROCHLORIDE 2 MG/ML
INJECTION, SOLUTION INTRAMUSCULAR; INTRAVENOUS; SUBCUTANEOUS PRN
Status: DISCONTINUED | OUTPATIENT
Start: 2022-08-12 | End: 2022-08-12 | Stop reason: SURG

## 2022-08-12 RX ORDER — CEFTRIAXONE 2 G/1
2 INJECTION, POWDER, FOR SOLUTION INTRAMUSCULAR; INTRAVENOUS ONCE
Status: COMPLETED | OUTPATIENT
Start: 2022-08-12 | End: 2022-08-12

## 2022-08-12 RX ORDER — ACETAMINOPHEN 325 MG/1
650 TABLET ORAL EVERY 6 HOURS PRN
Status: DISCONTINUED | OUTPATIENT
Start: 2022-08-17 | End: 2022-08-13 | Stop reason: HOSPADM

## 2022-08-12 RX ORDER — OXYCODONE HCL 5 MG/5 ML
10 SOLUTION, ORAL ORAL
Status: DISCONTINUED | OUTPATIENT
Start: 2022-08-12 | End: 2022-08-12 | Stop reason: HOSPADM

## 2022-08-12 RX ORDER — ROCURONIUM BROMIDE 10 MG/ML
INJECTION, SOLUTION INTRAVENOUS PRN
Status: DISCONTINUED | OUTPATIENT
Start: 2022-08-12 | End: 2022-08-12 | Stop reason: SURG

## 2022-08-12 RX ORDER — ALBUTEROL SULFATE 2.5 MG/3ML
2.5 SOLUTION RESPIRATORY (INHALATION)
Status: DISCONTINUED | OUTPATIENT
Start: 2022-08-12 | End: 2022-08-12 | Stop reason: HOSPADM

## 2022-08-12 RX ORDER — LIDOCAINE HYDROCHLORIDE 20 MG/ML
INJECTION, SOLUTION EPIDURAL; INFILTRATION; INTRACAUDAL; PERINEURAL PRN
Status: DISCONTINUED | OUTPATIENT
Start: 2022-08-12 | End: 2022-08-12 | Stop reason: SURG

## 2022-08-12 RX ORDER — DEXAMETHASONE SODIUM PHOSPHATE 4 MG/ML
4 INJECTION, SOLUTION INTRA-ARTICULAR; INTRALESIONAL; INTRAMUSCULAR; INTRAVENOUS; SOFT TISSUE
Status: DISCONTINUED | OUTPATIENT
Start: 2022-08-12 | End: 2022-08-13 | Stop reason: HOSPADM

## 2022-08-12 RX ORDER — OXYCODONE HYDROCHLORIDE 10 MG/1
10 TABLET ORAL
Status: DISCONTINUED | OUTPATIENT
Start: 2022-08-12 | End: 2022-08-13 | Stop reason: HOSPADM

## 2022-08-12 RX ORDER — ONDANSETRON 2 MG/ML
INJECTION INTRAMUSCULAR; INTRAVENOUS PRN
Status: DISCONTINUED | OUTPATIENT
Start: 2022-08-12 | End: 2022-08-12 | Stop reason: SURG

## 2022-08-12 RX ORDER — LABETALOL HYDROCHLORIDE 5 MG/ML
5 INJECTION, SOLUTION INTRAVENOUS
Status: DISCONTINUED | OUTPATIENT
Start: 2022-08-12 | End: 2022-08-12 | Stop reason: HOSPADM

## 2022-08-12 RX ORDER — LABETALOL HYDROCHLORIDE 5 MG/ML
INJECTION, SOLUTION INTRAVENOUS PRN
Status: DISCONTINUED | OUTPATIENT
Start: 2022-08-12 | End: 2022-08-12 | Stop reason: SURG

## 2022-08-12 RX ORDER — DIPHENHYDRAMINE HYDROCHLORIDE 50 MG/ML
12.5 INJECTION INTRAMUSCULAR; INTRAVENOUS
Status: DISCONTINUED | OUTPATIENT
Start: 2022-08-12 | End: 2022-08-12 | Stop reason: HOSPADM

## 2022-08-12 RX ORDER — HYDROMORPHONE HYDROCHLORIDE 1 MG/ML
0.4 INJECTION, SOLUTION INTRAMUSCULAR; INTRAVENOUS; SUBCUTANEOUS
Status: DISCONTINUED | OUTPATIENT
Start: 2022-08-12 | End: 2022-08-12 | Stop reason: HOSPADM

## 2022-08-12 RX ORDER — ONDANSETRON 2 MG/ML
4 INJECTION INTRAMUSCULAR; INTRAVENOUS EVERY 4 HOURS PRN
Status: DISCONTINUED | OUTPATIENT
Start: 2022-08-12 | End: 2022-08-13 | Stop reason: HOSPADM

## 2022-08-12 RX ORDER — SODIUM CHLORIDE, SODIUM LACTATE, POTASSIUM CHLORIDE, CALCIUM CHLORIDE 600; 310; 30; 20 MG/100ML; MG/100ML; MG/100ML; MG/100ML
INJECTION, SOLUTION INTRAVENOUS
Status: DISCONTINUED | OUTPATIENT
Start: 2022-08-12 | End: 2022-08-12 | Stop reason: SURG

## 2022-08-12 RX ORDER — OXYCODONE HYDROCHLORIDE 5 MG/1
5 TABLET ORAL
Status: DISCONTINUED | OUTPATIENT
Start: 2022-08-12 | End: 2022-08-13 | Stop reason: HOSPADM

## 2022-08-12 RX ORDER — MORPHINE SULFATE 4 MG/ML
4 INJECTION INTRAVENOUS ONCE
Status: COMPLETED | OUTPATIENT
Start: 2022-08-12 | End: 2022-08-12

## 2022-08-12 RX ORDER — ENOXAPARIN SODIUM 100 MG/ML
40 INJECTION SUBCUTANEOUS DAILY
Status: DISCONTINUED | OUTPATIENT
Start: 2022-08-13 | End: 2022-08-13 | Stop reason: HOSPADM

## 2022-08-12 RX ORDER — SODIUM CHLORIDE, SODIUM LACTATE, POTASSIUM CHLORIDE, CALCIUM CHLORIDE 600; 310; 30; 20 MG/100ML; MG/100ML; MG/100ML; MG/100ML
INJECTION, SOLUTION INTRAVENOUS
Status: COMPLETED | OUTPATIENT
Start: 2022-08-12 | End: 2022-08-12

## 2022-08-12 RX ORDER — HYDROMORPHONE HYDROCHLORIDE 1 MG/ML
0.5 INJECTION, SOLUTION INTRAMUSCULAR; INTRAVENOUS; SUBCUTANEOUS
Status: DISCONTINUED | OUTPATIENT
Start: 2022-08-12 | End: 2022-08-13 | Stop reason: HOSPADM

## 2022-08-12 RX ORDER — ONDANSETRON 2 MG/ML
4 INJECTION INTRAMUSCULAR; INTRAVENOUS ONCE
Status: COMPLETED | OUTPATIENT
Start: 2022-08-12 | End: 2022-08-12

## 2022-08-12 RX ORDER — SODIUM CHLORIDE 9 MG/ML
INJECTION, SOLUTION INTRAVENOUS CONTINUOUS
Status: DISCONTINUED | OUTPATIENT
Start: 2022-08-12 | End: 2022-08-13

## 2022-08-12 RX ORDER — DIPHENHYDRAMINE HYDROCHLORIDE 50 MG/ML
25 INJECTION INTRAMUSCULAR; INTRAVENOUS EVERY 6 HOURS PRN
Status: DISCONTINUED | OUTPATIENT
Start: 2022-08-12 | End: 2022-08-13 | Stop reason: HOSPADM

## 2022-08-12 RX ORDER — SODIUM CHLORIDE, SODIUM LACTATE, POTASSIUM CHLORIDE, CALCIUM CHLORIDE 600; 310; 30; 20 MG/100ML; MG/100ML; MG/100ML; MG/100ML
INJECTION, SOLUTION INTRAVENOUS CONTINUOUS
Status: DISCONTINUED | OUTPATIENT
Start: 2022-08-12 | End: 2022-08-12 | Stop reason: HOSPADM

## 2022-08-12 RX ORDER — MIDAZOLAM HYDROCHLORIDE 1 MG/ML
INJECTION INTRAMUSCULAR; INTRAVENOUS PRN
Status: DISCONTINUED | OUTPATIENT
Start: 2022-08-12 | End: 2022-08-12 | Stop reason: SURG

## 2022-08-12 RX ORDER — ONDANSETRON 2 MG/ML
4 INJECTION INTRAMUSCULAR; INTRAVENOUS
Status: DISCONTINUED | OUTPATIENT
Start: 2022-08-12 | End: 2022-08-12 | Stop reason: HOSPADM

## 2022-08-12 RX ADMIN — FENTANYL CITRATE 100 MCG: 50 INJECTION, SOLUTION INTRAMUSCULAR; INTRAVENOUS at 15:18

## 2022-08-12 RX ADMIN — LABETALOL HYDROCHLORIDE 10 MG: 5 INJECTION, SOLUTION INTRAVENOUS at 16:01

## 2022-08-12 RX ADMIN — SUGAMMADEX 200 MG: 100 INJECTION, SOLUTION INTRAVENOUS at 17:06

## 2022-08-12 RX ADMIN — SODIUM CHLORIDE, POTASSIUM CHLORIDE, SODIUM LACTATE AND CALCIUM CHLORIDE: 600; 310; 30; 20 INJECTION, SOLUTION INTRAVENOUS at 15:11

## 2022-08-12 RX ADMIN — OXYCODONE 5 MG: 5 TABLET ORAL at 23:14

## 2022-08-12 RX ADMIN — LABETALOL HYDROCHLORIDE 10 MG: 5 INJECTION, SOLUTION INTRAVENOUS at 16:15

## 2022-08-12 RX ADMIN — SODIUM CHLORIDE: 9 INJECTION, SOLUTION INTRAVENOUS at 10:51

## 2022-08-12 RX ADMIN — CEFAZOLIN 2 G: 330 INJECTION, POWDER, FOR SOLUTION INTRAMUSCULAR; INTRAVENOUS at 15:23

## 2022-08-12 RX ADMIN — HYDROMORPHONE HYDROCHLORIDE 0.5 MG: 2 INJECTION INTRAMUSCULAR; INTRAVENOUS; SUBCUTANEOUS at 16:55

## 2022-08-12 RX ADMIN — HYDROMORPHONE HYDROCHLORIDE 0.5 MG: 2 INJECTION INTRAMUSCULAR; INTRAVENOUS; SUBCUTANEOUS at 16:21

## 2022-08-12 RX ADMIN — MIDAZOLAM HYDROCHLORIDE 2 MG: 1 INJECTION, SOLUTION INTRAMUSCULAR; INTRAVENOUS at 15:18

## 2022-08-12 RX ADMIN — ONDANSETRON 4 MG: 2 INJECTION INTRAMUSCULAR; INTRAVENOUS at 10:41

## 2022-08-12 RX ADMIN — LIDOCAINE HYDROCHLORIDE 3 ML: 20 JELLY TOPICAL at 15:22

## 2022-08-12 RX ADMIN — ROCURONIUM BROMIDE 70 MG: 10 INJECTION, SOLUTION INTRAVENOUS at 15:22

## 2022-08-12 RX ADMIN — SODIUM CHLORIDE: 9 INJECTION, SOLUTION INTRAVENOUS at 20:18

## 2022-08-12 RX ADMIN — PROPOFOL 200 MG: 10 INJECTION, EMULSION INTRAVENOUS at 15:22

## 2022-08-12 RX ADMIN — FENTANYL CITRATE 50 MCG: 50 INJECTION, SOLUTION INTRAMUSCULAR; INTRAVENOUS at 15:57

## 2022-08-12 RX ADMIN — ONDANSETRON 4 MG: 2 INJECTION INTRAMUSCULAR; INTRAVENOUS at 16:53

## 2022-08-12 RX ADMIN — ONDANSETRON 4 MG: 2 INJECTION INTRAMUSCULAR; INTRAVENOUS at 23:13

## 2022-08-12 RX ADMIN — ACETAMINOPHEN 650 MG: 325 TABLET, FILM COATED ORAL at 20:17

## 2022-08-12 RX ADMIN — FENTANYL CITRATE 50 MCG: 50 INJECTION, SOLUTION INTRAMUSCULAR; INTRAVENOUS at 15:53

## 2022-08-12 RX ADMIN — MORPHINE SULFATE 4 MG: 4 INJECTION INTRAVENOUS at 12:32

## 2022-08-12 RX ADMIN — CEFTRIAXONE SODIUM 2 G: 2 INJECTION, POWDER, FOR SOLUTION INTRAMUSCULAR; INTRAVENOUS at 10:52

## 2022-08-12 RX ADMIN — LIDOCAINE HYDROCHLORIDE 100 MG: 20 INJECTION, SOLUTION EPIDURAL; INFILTRATION; INTRACAUDAL at 15:22

## 2022-08-12 RX ADMIN — SODIUM CHLORIDE, POTASSIUM CHLORIDE, SODIUM LACTATE AND CALCIUM CHLORIDE: 600; 310; 30; 20 INJECTION, SOLUTION INTRAVENOUS at 16:38

## 2022-08-12 RX ADMIN — MORPHINE SULFATE 4 MG: 4 INJECTION INTRAVENOUS at 10:40

## 2022-08-12 RX ADMIN — DEXAMETHASONE SODIUM PHOSPHATE 8 MG: 4 INJECTION, SOLUTION INTRA-ARTICULAR; INTRALESIONAL; INTRAMUSCULAR; INTRAVENOUS; SOFT TISSUE at 15:26

## 2022-08-12 RX ADMIN — ROCURONIUM BROMIDE 25 MG: 10 INJECTION, SOLUTION INTRAVENOUS at 16:25

## 2022-08-12 RX ADMIN — FENTANYL CITRATE 50 MCG: 50 INJECTION, SOLUTION INTRAMUSCULAR; INTRAVENOUS at 16:33

## 2022-08-12 ASSESSMENT — LIFESTYLE VARIABLES
HOW MANY TIMES IN THE PAST YEAR HAVE YOU HAD 5 OR MORE DRINKS IN A DAY: 0
CONSUMPTION TOTAL: NEGATIVE
HAVE PEOPLE ANNOYED YOU BY CRITICIZING YOUR DRINKING: NO
DOES PATIENT WANT TO STOP DRINKING: NO
TOTAL SCORE: 0
ON A TYPICAL DAY WHEN YOU DRINK ALCOHOL HOW MANY DRINKS DO YOU HAVE: 0
EVER HAD A DRINK FIRST THING IN THE MORNING TO STEADY YOUR NERVES TO GET RID OF A HANGOVER: NO
AVERAGE NUMBER OF DAYS PER WEEK YOU HAVE A DRINK CONTAINING ALCOHOL: 0
EVER FELT BAD OR GUILTY ABOUT YOUR DRINKING: NO
TOTAL SCORE: 0
HAVE YOU EVER FELT YOU SHOULD CUT DOWN ON YOUR DRINKING: NO
TOTAL SCORE: 0
ALCOHOL_USE: NO

## 2022-08-12 ASSESSMENT — PATIENT HEALTH QUESTIONNAIRE - PHQ9
SUM OF ALL RESPONSES TO PHQ QUESTIONS 1-9: 9
9. THOUGHTS THAT YOU WOULD BE BETTER OFF DEAD, OR OF HURTING YOURSELF: SEVERAL DAYS
8. MOVING OR SPEAKING SO SLOWLY THAT OTHER PEOPLE COULD HAVE NOTICED. OR THE OPPOSITE, BEING SO FIGETY OR RESTLESS THAT YOU HAVE BEEN MOVING AROUND A LOT MORE THAN USUAL: SEVERAL DAYS
2. FEELING DOWN, DEPRESSED, IRRITABLE, OR HOPELESS: SEVERAL DAYS
7. TROUBLE CONCENTRATING ON THINGS, SUCH AS READING THE NEWSPAPER OR WATCHING TELEVISION: SEVERAL DAYS
5. POOR APPETITE OR OVEREATING: SEVERAL DAYS
6. FEELING BAD ABOUT YOURSELF - OR THAT YOU ARE A FAILURE OR HAVE LET YOURSELF OR YOUR FAMILY DOWN: SEVERAL DAYS
3. TROUBLE FALLING OR STAYING ASLEEP OR SLEEPING TOO MUCH: SEVERAL DAYS
4. FEELING TIRED OR HAVING LITTLE ENERGY: SEVERAL DAYS
1. LITTLE INTEREST OR PLEASURE IN DOING THINGS: SEVERAL DAYS
SUM OF ALL RESPONSES TO PHQ9 QUESTIONS 1 AND 2: 2

## 2022-08-12 ASSESSMENT — PAIN DESCRIPTION - PAIN TYPE
TYPE: ACUTE PAIN;SURGICAL PAIN
TYPE: ACUTE PAIN;SURGICAL PAIN

## 2022-08-12 NOTE — H&P
CHIEF COMPLAINT: right upper quadrant pain and acute cholecystitis     HISTORY OF PRESENT ILLNESS: The patient is a 56 year-old White woman who presents to the Emergency Department a 1- day history of moderate and constant epigastric and right subcostal abdominal pain. The pain is associated with nausea, vomiting, and anorexia. She reports intermittent precipitation and exacerbation of symptoms with ingestion of fatty foods.  The patient denies any recent or intercurrent illness. The patient denies any recent or intercurrent illness. denies any history of ulcer, gastritis, cholangitis, pancreatitis, hepatitis, or previous abdominal ailments. The patient has undergone  unilateral SO.    PAST MEDICAL HISTORY:  has a past medical history of Anxiety, Depression, Fibroid, GERD (gastroesophageal reflux disease), Hemorrhoids (10/1/2014), High cholesterol, Tachycardia, and Thyroid nodule.    PAST SURGICAL HISTORY:  has a past surgical history that includes salpingo-oophorectomy, unilateral and bartholin gland excision.    ALLERGIES:   Allergies   Allergen Reactions    Codeine Itching     Pt states that she is sensative       CURRENT MEDICATIONS:    Home Medications       Reviewed by Jyoti Raygoza (Pharmacy Tech) on 08/12/22 at 1026  Med List Status: Complete     Medication Last Dose Status   aspirin effervescent (DARY-SELTZER) 325 MG Effer Tab 8/12/2022 Active   atorvastatin (LIPITOR) 20 MG Tab 8/11/2022 Active   fexofenadine (ALLEGRA ALLERGY) 180 MG tablet 8/11/2022 Active                    FAMILY HISTORY: family history includes Cancer in her mother; Heart Disease in her paternal grandfather; Hyperlipidemia in her father and mother; Hypertension in her father and mother; Thyroid in her sister.    SOCIAL HISTORY:  reports that she quit smoking about 10 years ago. Her smoking use included cigarettes. She has a 1.25 pack-year smoking history. She has never used smokeless tobacco. She reports that she does not drink  "alcohol and does not use drugs.    REVIEW OF SYSTEMS: Comprehensive review of systems is negative with the exception of the aforementioned HPI, PMH, and PSH bullets in accordance with CMS guidelines.    PHYSICAL EXAMINATION:      Constitutional:     Vital Signs: BP (!) 139/100   Pulse 90   Temp 36.7 °C (98 °F) (Temporal)   Resp 20   Ht 1.575 m (5' 2\")   Wt 85.8 kg (189 lb 2.5 oz)   SpO2 94%    General Appearance: appears stated age, is well developed and well nourished.  HEENT: The pupils are equal, round, and reactive to light bilaterally. The extraocular muscles are intact bilaterally.. The sclera are anicteric. Nares and oropharynx are clear.   Neck: Supple. No adenopathy.  Respiratory:   Inspection: Unlabored respirations, no intercostal retractions, paradoxical motion, or accessory muscle use.   Auscultation: normal.  Cardiovascular:   Inspection: The skin is warm, dry, and well purfused.  Auscultation: Regular rate and rhythm.   Peripheral Pulses: Normal.   Abdomen:  Inspection: Abdominal inspection reveals no abrasions, contusions, lacerations or penetrating wounds.   Palpation: Palpation is remarkable for moderate tenderness in the epigastric and right subcostal region.   Positive Oliver's sign. No abdominal wall hernias.  Extremities:   Examination of the upper and lower extremities demonstrates no cyanosis edema or clubbing.  Neurologic:   Alert & oriented to person, time and place. Normal motor function. Normal sensory function. No focal deficits noted.    LABORATORY VALUES:   Recent Labs     08/12/22  0808   WBC 8.9   RBC 4.79   HEMOGLOBIN 13.8   HEMATOCRIT 40.5   MCV 84.6   MCH 28.8   MCHC 34.1   RDW 39.8   PLATELETCT 277   MPV 10.5     Recent Labs     08/12/22  0808   SODIUM 133*   POTASSIUM 3.7   CHLORIDE 98   CO2 22   GLUCOSE 145*   BUN 10   CREATININE 0.45*   CALCIUM 9.0     Recent Labs     08/12/22  0808   ASTSGOT 24   ALTSGPT 30   TBILIRUBIN 0.3   ALKPHOSPHAT 89   GLOBULIN 2.9          "   IMAGING:   US-RUQ   Final Result      1.  Sonographic features of acute cholecystitis. Nonmobile gallstone at the gallbladder neck measuring 1.1 cm.   2.  Hepatic steatosis and hepatomegaly.          ASSESSMENT AND PLAN:     Acute cholecystitis- (present on admission)  Assessment & Plan  RUQ pain with N/V  WBC   U/S shows - Gallbladder is distended with nonmobile gallstone at the gallbladder neck measuring up to approximately 1.1 cm. Positive sonographic Oliver's sign.  The gallbladder wall thickness measures 4.40 mm. There is pericholecystic fluid.    Obesity (BMI 30-39.9)- (present on admission)  Assessment & Plan  8/12 BMI 34.6         The patient has Grade I (mild) right upper quadrant pain, cholelithiasis, and acute cholecystitis. Plan: laparoscopic cholecystectomy.    The patient will be taken to the operating room for laparoscopic cholecystectomy. The surgical conduct was discussed in detail. Potential complications including, but not limited to, infection, bleeding, damage to adjacent structures, bile duct injury, need to convert to an open procedure, and anesthetic complications were discussed. Operative consent signed.          ____________________________________     Junaid Calzada M.D.    DD: 8/12/2022  10:30 AM

## 2022-08-12 NOTE — ED PROVIDER NOTES
ED Provider Note    CHIEF COMPLAINT  Chief Complaint   Patient presents with    Abdominal Pain     RUQ starting last night, hx of RUQ pain x 1 year, hx gall stone and has upcoming ultrasound, pt states vomiting starting last night. Denies blood in vomit or stool. LBM this AM, denies recent constipation, pt is post menopausal.        ARJUN Vidal is a 52 y.o. female who presents to the emergency department complaining of right upper quadrant pain nausea and vomiting.  The patient knows that she has gallstones, she has had episodes of right upper quadrant pain going back almost a year but they have been mild and very infrequent but recently they have been more frequent and in May her doctor ordered an ultrasound which did show a gallstone at that time and the patient has been anticipating referral to a general surgeon but this has not yet happened.  Last night she developed a lot of pain in the right upper quadrant and its been unrelenting since about 10 PM she has been vomiting and the pain was so bad she was unable to get any rest or sleep overnight.  The pain is in the right upper quadrant with occasional sharp radiations to the back.    REVIEW OF SYSTEMS no fever no black or bloody stool or emesis no shortness of breath or hemoptysis.  All other systems negative    PAST MEDICAL HISTORY  Past Medical History:   Diagnosis Date    Anxiety     Depression     Fibroid     GERD (gastroesophageal reflux disease)     Hemorrhoids 10/1/2014    High cholesterol     Tachycardia     Thyroid nodule        FAMILY HISTORY  Family History   Problem Relation Age of Onset    Hyperlipidemia Mother     Hypertension Mother     Cancer Mother         thyroid    Hyperlipidemia Father     Hypertension Father     Thyroid Sister     Heart Disease Paternal Grandfather         MI    Diabetes Neg Hx        SOCIAL HISTORY  Social History     Socioeconomic History    Marital status: Single   Tobacco Use    Smoking status: Former      "Packs/day: 0.25     Years: 5.00     Pack years: 1.25     Types: Cigarettes     Quit date: 11/27/2011     Years since quitting: 10.7    Smokeless tobacco: Never   Vaping Use    Vaping Use: Never used   Substance and Sexual Activity    Alcohol use: No     Comment: few drinks a year    Drug use: No    Sexual activity: Yes     Partners: Male       SURGICAL HISTORY  Past Surgical History:   Procedure Laterality Date    BARTHOLIN GLAND EXCISION      SALPINGO-OOPHORECTOMY, UNILATERAL      dermoid cyst, age 19       CURRENT MEDICATIONS  Home Medications       Reviewed by Jyoti Raygoza (Pharmacy Tech) on 08/12/22 at 1026  Med List Status: Complete     Medication Last Dose Status   aspirin effervescent (DARY-SELTZER) 325 MG Effer Tab 8/12/2022 Active   atorvastatin (LIPITOR) 20 MG Tab 8/11/2022 Active   fexofenadine (ALLEGRA ALLERGY) 180 MG tablet 8/11/2022 Active                    ALLERGIES  Allergies   Allergen Reactions    Codeine Itching     Pt states that she is sensative       PHYSICAL EXAM  VITAL SIGNS: BP (!) 139/100   Pulse 90   Temp 36.7 °C (98 °F) (Temporal)   Resp 20   Ht 1.575 m (5' 2\")   Wt 85.8 kg (189 lb 2.5 oz)   SpO2 94%   BMI 34.60 kg/m²    Oxygen saturation is interpreted as adequate  Constitutional: Awake verbal uncomfortable but nontoxic-appearing  Eyes: No erythema discharge or jaundice  Neck: Trachea midline no JVD  Cardiovascular: Regular rate and rhythm  Lungs: Clear and equal bilaterally with no apparent difficulty.  Abdomen/Back: Soft and nondistended but clearly tender with Oliver sign in the right upper quadrant.  Bowel sounds are rare but present  Skin: Warm and dry  Musculoskeletal: No acute bony deformity  Neurologic: Awake verbal ambulatory moving all extremities    Laboratory  CBC shows normal white blood cell count of 8.9 hemoglobin is adequate at 13.8 basic metabolic panel shows a slightly elevated glucose of 145 LFTs and lipase are unremarkable bilirubins were normal.  " Urinalysis negative for nitrite leukocyte Estrace and blood    Radiology  US-RUQ   Final Result      1.  Sonographic features of acute cholecystitis. Nonmobile gallstone at the gallbladder neck measuring 1.1 cm.   2.  Hepatic steatosis and hepatomegaly.        MEDICAL DECISION MAKING and DISPOSITION  In the emergency department an IV was established patient was placed on the cardiac monitor and kept NPO.  I have ordered intravenous morphine Zofran fluids and intravenous ceftriaxone.  I have reviewed the findings with the patient and her family and I have reviewed the case with Dr. Junaid Calzada and Dr. Calzada will be admitting the patient for further surgical evaluation and treatment.    IMPRESSION  1.  Cholelithiasis with acute cholecystitis      Electronically signed by: Jaren Nunes M.D., 8/12/2022 10:55 AM

## 2022-08-12 NOTE — ANESTHESIA PROCEDURE NOTES
Airway    Date/Time: 8/12/2022 3:22 PM  Performed by: Junaid Murdock M.D.  Authorized by: Junaid Murdock M.D.     Location:  OR  Urgency:  Elective  Difficult Airway: No    Indications for Airway Management:  Anesthesia      Spontaneous Ventilation: absent    Sedation Level:  Deep  Preoxygenated: Yes    Patient Position:  Sniffing  Mask Difficulty Assessment:  2 - vent by mask + OA or adjuvant +/- NMBA  Final Airway Type:  Endotracheal airway  Final Endotracheal Airway:  ETT  Cuffed: Yes    Technique Used for Successful ETT Placement:  Direct laryngoscopy    Insertion Site:  Oral  Blade Type:  Mine  Laryngoscope Blade/Videolaryngoscope Blade Size:  3  ETT Size (mm):  6.5  Measured from:  Teeth  ETT to Teeth (cm):  21  Placement Verified by: auscultation and capnometry    Cormack-Lehane Classification:  Grade I - full view of glottis  Number of Attempts at Approach:  1   Atraumatic DLx1

## 2022-08-12 NOTE — ASSESSMENT & PLAN NOTE
8/12 BMI 34.6    Muscle Hinge Flap Text: The defect edges were debeveled with a #15 scalpel blade.  Given the size, depth and location of the defect and the proximity to free margins a muscle hinge flap was deemed most appropriate.  Using a sterile surgical marker, an appropriate hinge flap was drawn incorporating the defect. The area thus outlined was incised with a #15 scalpel blade.  The skin margins were undermined to an appropriate distance in all directions utilizing iris scissors.

## 2022-08-12 NOTE — ASSESSMENT & PLAN NOTE
RUQ pain with N/V  WBC 8.9  U/S shows - Gallbladder is distended with nonmobile gallstone at the gallbladder neck measuring up to approximately 1.1 cm. Positive sonographic Oliver's sign.  The gallbladder wall thickness measures 4.40 mm. There is pericholecystic fluid.  8/12 Lap kirby and liver biopsy

## 2022-08-12 NOTE — ED TRIAGE NOTES
"Chief Complaint   Patient presents with    Abdominal Pain     RUQ starting last night, hx of RUQ pain x 1 year, hx gall stone and has upcoming ultrasound, pt states vomiting starting last night. Denies blood in vomit or stool. LBM this AM, denies recent constipation, pt is post menopausal.      Pt ambulatory to triage for above complaints, VSS on RA, GCS 15, NAD.    Denies all s/sx of covid, denies recent travel, denies fevers.    Pt returned to [facility]. Educated on triage process and to inform staff of any changes.     BP (!) 139/100   Pulse 90   Temp 36.7 °C (98 °F) (Temporal)   Resp 20   Ht 1.575 m (5' 2\")   Wt 85.8 kg (189 lb 2.5 oz)   SpO2 94%   BMI 34.60 kg/m²     "

## 2022-08-12 NOTE — ANESTHESIA PREPROCEDURE EVALUATION
Case: [id number] Date/Time: 08/12/22 1426    Procedure: CHOLECYSTECTOMY, LAPAROSCOPIC PSB OPEN    Location: [facility] OR 10 / SURGERY [facility]    Surgeons: Junaid Calzada M.D.          Relevant Problems   CARDIAC   (positive) Hemorrhoids         (positive) Fatty liver disease, nonalcoholic      Other   (positive) Acute cholecystitis   (positive) Obesity (BMI 30-39.9)   DLD    Physical Exam    Airway   Mallampati: II  TM distance: >3 FB  Neck ROM: full       Cardiovascular - normal exam  Rhythm: regular  Rate: normal  (-) murmur     Dental - normal exam           Pulmonary - normal exam  Breath sounds clear to auscultation     Abdominal    Neurological - normal exam                 Anesthesia Plan    ASA 2- EMERGENT   ASA physical status emergent criteria: acutely contaminated wound or identified infection source    Plan - general       Airway plan will be ETT          Induction: intravenous    Postoperative Plan: Postoperative administration of opioids is intended.    Pertinent diagnostic labs and testing reviewed    Informed Consent:    Anesthetic plan and risks discussed with patient.    Use of blood products discussed with: patient whom consented to blood products.

## 2022-08-13 VITALS
HEIGHT: 62 IN | DIASTOLIC BLOOD PRESSURE: 46 MMHG | OXYGEN SATURATION: 98 % | TEMPERATURE: 97.8 F | BODY MASS INDEX: 34.81 KG/M2 | SYSTOLIC BLOOD PRESSURE: 102 MMHG | HEART RATE: 99 BPM | RESPIRATION RATE: 16 BRPM | WEIGHT: 189.15 LBS

## 2022-08-13 LAB
ALBUMIN SERPL BCP-MCNC: 4 G/DL (ref 3.2–4.9)
ALBUMIN/GLOB SERPL: 1.4 G/DL
ALP SERPL-CCNC: 72 U/L (ref 30–99)
ALT SERPL-CCNC: 131 U/L (ref 2–50)
ANION GAP SERPL CALC-SCNC: 9 MMOL/L (ref 7–16)
AST SERPL-CCNC: 95 U/L (ref 12–45)
BASOPHILS # BLD AUTO: 0.4 % (ref 0–1.8)
BASOPHILS # BLD: 0.04 K/UL (ref 0–0.12)
BILIRUB SERPL-MCNC: 0.4 MG/DL (ref 0.1–1.5)
BUN SERPL-MCNC: 7 MG/DL (ref 8–22)
CALCIUM SERPL-MCNC: 8.6 MG/DL (ref 8.5–10.5)
CHLORIDE SERPL-SCNC: 104 MMOL/L (ref 96–112)
CO2 SERPL-SCNC: 27 MMOL/L (ref 20–33)
CREAT SERPL-MCNC: 0.51 MG/DL (ref 0.5–1.4)
EOSINOPHIL # BLD AUTO: 0 K/UL (ref 0–0.51)
EOSINOPHIL NFR BLD: 0 % (ref 0–6.9)
ERYTHROCYTE [DISTWIDTH] IN BLOOD BY AUTOMATED COUNT: 42.6 FL (ref 35.9–50)
GFR SERPLBLD CREATININE-BSD FMLA CKD-EPI: 112 ML/MIN/1.73 M 2
GLOBULIN SER CALC-MCNC: 2.9 G/DL (ref 1.9–3.5)
GLUCOSE SERPL-MCNC: 133 MG/DL (ref 65–99)
HCT VFR BLD AUTO: 39 % (ref 37–47)
HGB BLD-MCNC: 13.1 G/DL (ref 12–16)
IMM GRANULOCYTES # BLD AUTO: 0.03 K/UL (ref 0–0.11)
IMM GRANULOCYTES NFR BLD AUTO: 0.3 % (ref 0–0.9)
LYMPHOCYTES # BLD AUTO: 1.69 K/UL (ref 1–4.8)
LYMPHOCYTES NFR BLD: 16.6 % (ref 22–41)
MCH RBC QN AUTO: 29.3 PG (ref 27–33)
MCHC RBC AUTO-ENTMCNC: 33.6 G/DL (ref 33.6–35)
MCV RBC AUTO: 87.2 FL (ref 81.4–97.8)
MONOCYTES # BLD AUTO: 0.8 K/UL (ref 0–0.85)
MONOCYTES NFR BLD AUTO: 7.9 % (ref 0–13.4)
NEUTROPHILS # BLD AUTO: 7.63 K/UL (ref 2–7.15)
NEUTROPHILS NFR BLD: 74.8 % (ref 44–72)
NRBC # BLD AUTO: 0 K/UL
NRBC BLD-RTO: 0 /100 WBC
PLATELET # BLD AUTO: 273 K/UL (ref 164–446)
PMV BLD AUTO: 10.6 FL (ref 9–12.9)
POTASSIUM SERPL-SCNC: 3.7 MMOL/L (ref 3.6–5.5)
PROT SERPL-MCNC: 6.9 G/DL (ref 6–8.2)
RBC # BLD AUTO: 4.47 M/UL (ref 4.2–5.4)
SODIUM SERPL-SCNC: 140 MMOL/L (ref 135–145)
WBC # BLD AUTO: 10.2 K/UL (ref 4.8–10.8)

## 2022-08-13 PROCEDURE — 99024 POSTOP FOLLOW-UP VISIT: CPT | Performed by: NURSE PRACTITIONER

## 2022-08-13 PROCEDURE — A9270 NON-COVERED ITEM OR SERVICE: HCPCS | Performed by: SURGERY

## 2022-08-13 PROCEDURE — A9270 NON-COVERED ITEM OR SERVICE: HCPCS | Performed by: NURSE PRACTITIONER

## 2022-08-13 PROCEDURE — 85025 COMPLETE CBC W/AUTO DIFF WBC: CPT

## 2022-08-13 PROCEDURE — 80053 COMPREHEN METABOLIC PANEL: CPT

## 2022-08-13 PROCEDURE — 700102 HCHG RX REV CODE 250 W/ 637 OVERRIDE(OP): Performed by: NURSE PRACTITIONER

## 2022-08-13 PROCEDURE — G0378 HOSPITAL OBSERVATION PER HR: HCPCS

## 2022-08-13 PROCEDURE — 36415 COLL VENOUS BLD VENIPUNCTURE: CPT

## 2022-08-13 PROCEDURE — 700102 HCHG RX REV CODE 250 W/ 637 OVERRIDE(OP): Performed by: SURGERY

## 2022-08-13 RX ORDER — POLYETHYLENE GLYCOL 3350 17 G/17G
1 POWDER, FOR SOLUTION ORAL 2 TIMES DAILY
Status: DISCONTINUED | OUTPATIENT
Start: 2022-08-13 | End: 2022-08-13 | Stop reason: HOSPADM

## 2022-08-13 RX ORDER — OXYCODONE HYDROCHLORIDE 5 MG/1
5 TABLET ORAL EVERY 6 HOURS PRN
Qty: 10 TABLET | Refills: 0 | Status: ON HOLD | OUTPATIENT
Start: 2022-08-13 | End: 2022-08-19

## 2022-08-13 RX ORDER — AMOXICILLIN 250 MG
1 CAPSULE ORAL DAILY
Status: DISCONTINUED | OUTPATIENT
Start: 2022-08-13 | End: 2022-08-13 | Stop reason: HOSPADM

## 2022-08-13 RX ORDER — ACETAMINOPHEN 325 MG/1
650 TABLET ORAL EVERY 6 HOURS PRN
COMMUNITY
Start: 2022-08-13

## 2022-08-13 RX ADMIN — ACETAMINOPHEN 650 MG: 325 TABLET, FILM COATED ORAL at 12:18

## 2022-08-13 RX ADMIN — SENNOSIDES AND DOCUSATE SODIUM 1 TABLET: 50; 8.6 TABLET ORAL at 09:23

## 2022-08-13 RX ADMIN — ACETAMINOPHEN 650 MG: 325 TABLET, FILM COATED ORAL at 04:50

## 2022-08-13 RX ADMIN — POLYETHYLENE GLYCOL 3350 1 PACKET: 17 POWDER, FOR SOLUTION ORAL at 09:23

## 2022-08-13 RX ADMIN — MAGNESIUM HYDROXIDE 30 ML: 400 SUSPENSION ORAL at 09:23

## 2022-08-13 ASSESSMENT — COGNITIVE AND FUNCTIONAL STATUS - GENERAL
CLIMB 3 TO 5 STEPS WITH RAILING: A LITTLE
STANDING UP FROM CHAIR USING ARMS: A LITTLE
DRESSING REGULAR LOWER BODY CLOTHING: A LITTLE
HELP NEEDED FOR BATHING: A LITTLE
TURNING FROM BACK TO SIDE WHILE IN FLAT BAD: A LITTLE
DAILY ACTIVITIY SCORE: 22
SUGGESTED CMS G CODE MODIFIER MOBILITY: CJ
MOBILITY SCORE: 20
SUGGESTED CMS G CODE MODIFIER DAILY ACTIVITY: CJ
MOVING TO AND FROM BED TO CHAIR: A LITTLE

## 2022-08-13 ASSESSMENT — PATIENT HEALTH QUESTIONNAIRE - PHQ9
3. TROUBLE FALLING OR STAYING ASLEEP OR SLEEPING TOO MUCH: SEVERAL DAYS
2. FEELING DOWN, DEPRESSED, IRRITABLE, OR HOPELESS: SEVERAL DAYS
6. FEELING BAD ABOUT YOURSELF - OR THAT YOU ARE A FAILURE OR HAVE LET YOURSELF OR YOUR FAMILY DOWN: SEVERAL DAYS
5. POOR APPETITE OR OVEREATING: SEVERAL DAYS
1. LITTLE INTEREST OR PLEASURE IN DOING THINGS: SEVERAL DAYS
4. FEELING TIRED OR HAVING LITTLE ENERGY: SEVERAL DAYS
7. TROUBLE CONCENTRATING ON THINGS, SUCH AS READING THE NEWSPAPER OR WATCHING TELEVISION: SEVERAL DAYS
SUM OF ALL RESPONSES TO PHQ9 QUESTIONS 1 AND 2: 2
9. THOUGHTS THAT YOU WOULD BE BETTER OFF DEAD, OR OF HURTING YOURSELF: SEVERAL DAYS
SUM OF ALL RESPONSES TO PHQ QUESTIONS 1-9: 9
8. MOVING OR SPEAKING SO SLOWLY THAT OTHER PEOPLE COULD HAVE NOTICED. OR THE OPPOSITE, BEING SO FIGETY OR RESTLESS THAT YOU HAVE BEEN MOVING AROUND A LOT MORE THAN USUAL: SEVERAL DAYS

## 2022-08-13 ASSESSMENT — PAIN DESCRIPTION - PAIN TYPE
TYPE: ACUTE PAIN;SURGICAL PAIN
TYPE: ACUTE PAIN
TYPE: SURGICAL PAIN

## 2022-08-13 NOTE — PROGRESS NOTES
4 Eyes Skin Assessment Completed by TANYA Gomez and TANYA Hagan.    Head WDL  Ears WDL  Nose WDL  Mouth WDL  Neck WDL  Breast/Chest WDL  Shoulder Blades WDL  Spine WDL  (R) Arm/Elbow/Hand WDL  (L) Arm/Elbow/Hand WDL  Abdomen Incision x4 lap sites with dermabond  Groin WDL  Scrotum/Coccyx/Buttocks WDL  (R) Leg WDL  (L) Leg WDL  (R) Heel/Foot/Toe WDL  (L) Heel/Foot/Toe WDL          Devices In Places Pulse Ox, SCD's, and Oxy Mask      Interventions In Place NC W/Ear Foams, Pillows, and Pressure Redistribution Mattress    Possible Skin Injury No    Pictures Uploaded Into Epic N/A  Wound Consult Placed N/A  RN Wound Prevention Protocol Ordered No

## 2022-08-13 NOTE — PROGRESS NOTES
"Patient admitted to room T411 via transport in [facility] from PACU at 2000.  BP (!) 150/90 Comment: RN aware   Pulse 89   Temp 37.2 °C (98.9 °F) (Temporal)   Resp 18   Ht 1.575 m (5' 2\")   Wt 85.8 kg (189 lb 2.5 oz)   SpO2 93%   BMI 34.60 kg/m²      Patient reports pain at 2 on a scale of 0-10. Educated patient regarding pharmacologic and non pharmacologic modalities for pain management. Oriented to room call light and smoking policy.  Reviewed plan of care (equipment, incentive spirometer, sequential compression devices, medications, activity, diet, fall precautions, skin care, and pain) with patient and family. Welcome packet given and reviewed with patient, all questions answered. Education provided on oral hygiene program.    A&Ox4. Denies CP/SOB.  See 2 RN skin note  Tolerating clear liquid diet.   + void. + BM. Last BM PTA.  Pt ambulates with standby assist.  Spo2 >95% on 10L ERAS.  All needs met at this time. Call light within reach. Pt calls appropriately. Bed low and locked, non skid socks in place. Hourly rounding in place.   "

## 2022-08-13 NOTE — OP REPORT
DATE OF OPERATION:   8/12/2022     PREOPERATIVE DIAGNOSIS: right upper quadrant pain, cholelithiasis, acute cholecystitis, and hydrops.    POSTOPERATIVE DIAGNOSIS: right upper quadrant pain, cholelithiasis, acute cholecystitis, and hydrops.    PROCEDURE PERFORMED: laparoscopic cholecystectomy / liver biopsy x 2 right lobe    SURGEON:    Junaid Calzada M.D.    ASSISTANT:    JIM Hicks.     ANESTHESIOLOGIST:  Junaid Murdock M.D.    ANESTHESIA:   General endotracheal anesthesia. / Local 30 cc 0.5% maracaine with epi    ASA CLASSIFICATION:  II. Emergent.    INDICATIONS: The patient is a 52 year-old woman with clinical and radiographic findings of right upper quadrant pain, cholelithiasis, acute cholecystitis, and hydrops. She is taken to the operating room for planned laparoscopic cholecystectomy and percutaneous liver biopsy     The nature of the surgical procedure warranted additional skilled operative assistance from an Advanced Registered Nurse Practitioner (ARNP). The assistant was present during the entire operation. The surgical assistant performed the following: provided assistance with optimal surgical exposure of the operative field, provided high complexity, subspecialty decision making input, operated the camera for the laparoscopic portion of the procedure, assisted with the surgical resection, assisted with the fascial closure, and performed the skin closure and dressing application.     FINDINGS:  Bleeding from all incision and surfaces,  intraabdominal adhesions right upper quadrant and right lobe of liver. Gallbladder wall thickening and acute inflammation. Fatty appearing liver.    WOUND CLASSIFICATION:  Class III, Contaminated.    SPECIMEN:    Gallbladder. Core needle liver biopsy.    ESTIMATED BLOOD LOSS: 100 mL.    PROCEDURE: Following informed consent consent, the patient was properly identified, taken to the operating room and placed in supine position where general  endotracheal anesthesia was administered. Intravenous antibiotics were administered in the Emergency Department within the correct time interval. The patient voided prior to surgery. A urinary catheter was not placed. Sequential compression devices were employed. The abdomen was prepped and draped into a sterile field. A timeout was conducted with the full attention and participation of all operating room personnel.    Marcaine 0.5% was used to infiltrate the port sites. A supraumbilical incision was made and the subcutaneous tissues spread bluntly. The fascia was incised and a 5 mm port without a trocar was inserted.  Carbon dioxide pneumoperitoneum was instilled.  A 5 mm 30 degree lens and camera was passed into the peritoneal cavity. An 11 mm port was placed in the epigastric midline under direct vision. Two 5 mm right subcostal ports were placed under direct vision.     The gallbladder was identified and elevated. Dissection was carried out to completely expose and delineate the hepatocystic triangle. The critical view was achieved definitively identifying the single cystic duct and single cystic artery entering the gallbladder. These structures were multiply clipped proximally, once distally and divided. The gallbladder was dissected free from the undersurface of the liver using electrocautery and placed within a laparoscopic specimen retrieval bag. The gallbladder was delivered intact from the abdominal cavity and submitted for pathology.  The gallbladder fossa was irrigated. All excess irrigant was evacuated from the abdominal cavity.  The gallbladder fossa was inspected. Hemostasis was satisfactory. The gallbladder fossa was inspected. Hemostasis was controlled with electrocautery and application of SURGICEL® Powder Absorbable Hemostat.    The epigastric port site fascia was approximated using a trocar site closure device with a 0 VICRYL® Plus Antibacterial suture.x 2.    The patient tolerated the procedure  well, and there were no apparent complications. All sponge, needle, and instrument counts were correct on 2 separate occasions. The patient was awakened, extubated, and transferred to  to the post anesthesia care unit (PACU) in satisfactory condition.       ____________________________________     Junaid Calzada M.D.    DD: 8/12/2022  5:19 PM

## 2022-08-13 NOTE — ANESTHESIA TIME REPORT
Anesthesia Start and Stop Event Times     Date Time Event    8/12/2022 1439 Ready for Procedure     1507 Anesthesia Start     1721 Anesthesia Stop        Responsible Staff  08/12/22    Name Role Begin End    Junaid Murdock M.D. Anesth 1507 1721        Overtime Reason:  no overtime (within assigned shift)    Comments:

## 2022-08-13 NOTE — ANESTHESIA POSTPROCEDURE EVALUATION
Patient: Sury Abreu [place]    Procedure Summary     Date: 08/12/22 Room / Location: [place] [address] / SURGERY [facility]    Anesthesia Start: 1507 Anesthesia Stop: 1721    Procedures:       CHOLECYSTECTOMY, LAPAROSCOPIC (Abdomen)      BIOPSY, LIVER (Abdomen) Diagnosis: (ACUTE CHOLECYSTITIS)    Surgeons: Junaid Calzada M.D. Responsible Provider: Junaid Murdock M.D.    Anesthesia Type: general ASA Status: 2 - Emergent          Final Anesthesia Type: general  Last vitals  BP   Blood Pressure: 137/69    Temp   36.9 °C (98.4 °F)    Pulse   81   Resp   (!) 10    SpO2   94 %      Anesthesia Post Evaluation    Patient location during evaluation: PACU  Patient participation: complete - patient participated  Level of consciousness: awake and alert    Airway patency: patent  Anesthetic complications: no  Cardiovascular status: hemodynamically stable  Respiratory status: acceptable  Hydration status: euvolemic    PONV: none          No notable events documented.     Nurse Pain Score: 0 (NPRS)

## 2022-08-13 NOTE — PROGRESS NOTES
Report received at 0700 from Starr MARTÍNEZ and Danya MARTÍNEZ. Care of patient assumed at that time. Patient is Aox4. Assessment completed. Patient has tolerated ambulation and diet being advanced to regular.

## 2022-08-13 NOTE — CARE PLAN
The patient is Stable - Low risk of patient condition declining or worsening    Shift Goals  Clinical Goals: nausea control, pain control    Progress made toward(s) clinical / shift goals:  Patient medicated for nausea per MAR.     Patient is not progressing towards the following goals:

## 2022-08-13 NOTE — DISCHARGE SUMMARY
Discharge Summary    DATE OF ADMISSION: 8/12/2022    DATE OF DISCHARGE: 8/13/2022    DISCHARGE DIAGNOSIS:  Acute Cholecystitis.     CONSULTATIONS:  none    PROCEDURES:   Procedure completed by Dr. Calzada on August 12, 2022, Laparoscopic cholecystectomy and liver biopsy X 2.    BRIEF HPI and HOSPITAL COURSE:  Admitted with above, see admission history and physical. Underwent procedure as above. On day of discharge, the patient has stable vital signs, on room air, tolerating an oral diet, and pain is well controlled with PO meds. The patient is stable for discharge to home.    DISPOSITION: Discharged home on 8/13/2022. The patient and family were counseled and questions were answered. Specifically, signs and symptoms of infection, respiratory decompensation, follow up in ACS clinic, wound care and persistent or worsening pain were discussed and the patient agrees to seek medical attention if any of these develop.    DISCHARGE MEDICATIONS:  The patients controlled substance history was reviewed and a controlled substance use informed consent (if applicable) was provided by [facility] and the patient has been prescribed.     Medication List        START taking these medications        Instructions   acetaminophen 325 MG Tabs  Commonly known as: Tylenol   Take 2 Tablets by mouth every 6 hours as needed for Mild Pain.  Dose: 650 mg     oxyCODONE immediate-release 5 MG Tabs  Commonly known as: ROXICODONE   Take 1 Tablet by mouth every 6 hours as needed for Severe Pain for up to 3 days.  Dose: 5 mg            CONTINUE taking these medications        Instructions   atorvastatin 20 MG Tabs  Commonly known as: LIPITOR   Take 1 Tablet by mouth every evening.  Dose: 20 mg     fexofenadine 180 MG tablet  Commonly known as: ALLEGRA   Take 180 mg by mouth 1 time a day as needed (Allergies).  Dose: 180 mg            STOP taking these medications      aspirin effervescent 325 MG Tbef  Commonly known as:  NAIMA              ACTIVITY:  No strenuous exercise or heavy lifting (more than 10 lbs) until released in follow up.       WOUND CARE:  You may shower, but do not submerge in a bath for at least two weeks. If you have wound dressings, they may come off after 48 hours. If you have skin glue to the wound, this will fall off on its own, do not pick at it. If you have steri strips to the wound, these will fall off on their own, do not pick at them, may trim the edges if needed.       DIET:  Orders Placed This Encounter   Procedures    Diet Order Diet: Regular     Standing Status:   Standing     Number of Occurrences:   1     Order Specific Question:   ERAS     Answer:   Yes     Order Specific Question:   Diet:     Answer:   Regular [1]       FOLLOW UP:  [facility]  [address] [place] [id number]  [phone]  Follow up in 1 week(s)  Follow up acute care surgery clinic in 1-2 weeks      TIME SPENT ON DISCHARGE: 29 minutes      ____________________________________________  MALAIKA Johnson    DD: 8/13/2022 4:44 PM

## 2022-08-13 NOTE — PROGRESS NOTES
"    DATE: 8/13/2022    Post Operative Day  1 laparoscopic cholecystectomy.    INTERVAL EVENTS:  Tolerating clears - ADAT  Adequate pain control  Some belching, no flatus   No overt signs of bleeding   Voiding  Mobilize  AM labs pending    Anticipate home this afternoon if BM and tolerating diet     PHYSICAL EXAMINATION:  Vital Signs: /67   Pulse 93   Temp 36.8 °C (98.2 °F) (Temporal)   Resp 16   Ht 1.575 m (5' 2\")   Wt 85.8 kg (189 lb 2.5 oz)   SpO2 93%   Physical Exam  Vitals and nursing note reviewed.   Constitutional:       General: She is not in acute distress.  HENT:      Head: Normocephalic.      Mouth/Throat:      Mouth: Mucous membranes are moist.   Pulmonary:      Effort: Pulmonary effort is normal. No respiratory distress.   Chest:      Chest wall: No tenderness.   Abdominal:      Comments: Soft   Non distended  Tenderness with palpation in the RUQ as expected  Lap sites CDI   Skin:     General: Skin is warm and dry.      Capillary Refill: Capillary refill takes less than 2 seconds.   Neurological:      General: No focal deficit present.      Mental Status: She is alert and oriented to person, place, and time.   Psychiatric:         Mood and Affect: Mood normal.         Behavior: Behavior normal.         Thought Content: Thought content normal.         LABORATORY VALUES:   Recent Labs     08/12/22  0808   WBC 8.9   RBC 4.79   HEMOGLOBIN 13.8   HEMATOCRIT 40.5   MCV 84.6   MCH 28.8   MCHC 34.1   RDW 39.8   PLATELETCT 277   MPV 10.5     Recent Labs     08/12/22  0808   SODIUM 133*   POTASSIUM 3.7   CHLORIDE 98   CO2 22   GLUCOSE 145*   BUN 10   CREATININE 0.45*   CALCIUM 9.0     Recent Labs     08/12/22  0808   ASTSGOT 24   ALTSGPT 30   TBILIRUBIN 0.3   ALKPHOSPHAT 89   GLOBULIN 2.9            IMAGING:   US-RUQ   Final Result      1.  Sonographic features of acute cholecystitis. Nonmobile gallstone at the gallbladder neck measuring 1.1 cm.   2.  Hepatic steatosis and hepatomegaly.    "       Medications reviewed and Labs reviewed  Simth catheter: No Smith      DVT Prophylaxis: Enoxaparin (Lovenox)  DVT prophylaxis - mechanical: SCDs  Ulcer prophylaxis: Not indicated    Assessed for rehab: Patient returned to prior level of function, rehabilitation not indicated at this time    ASSESSMENT AND PLAN:   Acute cholecystitis- (present on admission)  Assessment & Plan  RUQ pain with N/V  WBC 8.9  U/S shows - Gallbladder is distended with nonmobile gallstone at the gallbladder neck measuring up to approximately 1.1 cm. Positive sonographic Oliver's sign.  The gallbladder wall thickness measures 4.40 mm. There is pericholecystic fluid.  8/12 Lap kirby and liver biopsy     Obesity (BMI 30-39.9)- (present on admission)  Assessment & Plan  8/12 BMI 34.6     Discussed patient condition with RN, Patient, and Dr. YARELI Calzada .

## 2022-08-13 NOTE — OR NURSING
"1719: Pt arrived from OR post lap kirby and liver biopsy. Pt is asleep;oral airway present. Abdominal sights x 4 are CDI. Cardiac rhythm appears to be SR.     1800: Pt sleepy, but arousable to RN voice; pt denies nausea. Pt reports no pain. On 10L per ERAS.     1820: Lab armando COD and TEG.     1846: Updated pt's mom, Pili.     1917: Report to TANYA Horton. Pt denies pain; reports feeling \"uncomfortable\" but tolerable at this time. Tolerating sips of sprite.     1950: Pt to T 411 via [place] with transport. Pt on 10L; tank is full.           "

## 2022-08-13 NOTE — DISCHARGE INSTRUCTIONS
Laparoscopic Cholecystectomy, Care After  This sheet gives you information about how to care for yourself after your procedure. Your doctor may also give you more specific instructions. If you have problems or questions, contact your doctor.  Follow these instructions at home:  Care for cuts from surgery (incisions)    Follow instructions from your doctor about how to take care of your cuts from surgery. Make sure you:  Wash your hands with soap and water before you change your bandage (dressing). If you cannot use soap and water, use hand .  Change your bandage as told by your doctor.  Leave stitches (sutures), skin glue, or skin tape (adhesive) strips in place. They may need to stay in place for 2 weeks or longer. If tape strips get loose and curl up, you may trim the loose edges. Do not remove tape strips completely unless your doctor says it is okay.  Do not take baths, swim, or use a hot tub until your doctor says it is okay. Ask your doctor if you can take showers. You may only be allowed to take sponge baths for bathing.  Check your surgical cut area every day for signs of infection. Check for:  More redness, swelling, or pain.  More fluid or blood.  Warmth.  Pus or a bad smell.  Activity  Do not drive or use heavy machinery while taking prescription pain medicine.  Do not lift anything that is heavier than 10 lb (4.5 kg) until your doctor says it is okay.  Do not play contact sports until your doctor says it is okay.  Do not drive for 24 hours if you were given a medicine to help you relax (sedative).  Rest as needed. Do not return to work or school until your doctor says it is okay.  General instructions  Take over-the-counter and prescription medicines only as told by your doctor.  To prevent or treat constipation while you are taking prescription pain medicine, your doctor may recommend that you:  Drink enough fluid to keep your pee (urine) clear or pale yellow.  Take over-the-counter or  prescription medicines.  Eat foods that are high in fiber, such as fresh fruits and vegetables, whole grains, and beans.  Limit foods that are high in fat and processed sugars, such as fried and sweet foods.  Contact a doctor if:  You develop a rash.  You have more redness, swelling, or pain around your surgical cuts.  You have more fluid or blood coming from your surgical cuts.  Your surgical cuts feel warm to the touch.  You have pus or a bad smell coming from your surgical cuts.  You have a fever.  One or more of your surgical cuts breaks open.  Get help right away if:  You have trouble breathing.  You have chest pain.  You have pain that is getting worse in your shoulders.  You faint or feel dizzy when you stand.  You have very bad pain in your belly (abdomen).  You are sick to your stomach (nauseous) for more than one day.  You have throwing up (vomiting) that lasts for more than one day.  You have leg pain.  This information is not intended to replace advice given to you by your health care provider. Make sure you discuss any questions you have with your health care provider.      Post Operative Discharge Instructions:    1. DIET: Upon discharge from the hospital you may resume your normal preoperative diet. Depending on how you are feeling and whether you have nausea or not, you may wish to stay with a bland diet for the first few days. However, you can advance this as quickly as you feel ready.    2. ACTIVITIES: After discharge from the hospital, you may resume full routine activities. However, there should be no heavy lifting (greater than 10 pounds) and no strenuous activities until after your follow-up visit. Otherwise, routine activities of daily living are acceptable.    3. DRIVING: You may drive whenever you are off pain medications and are able to perform the activities needed to drive, i.e. turning, bending, twisting, etc.    4. BATHING: You may get the wound wet at any time after leaving the  hospital. You may shower, but do not submerge in a bath for at least two weeks. If you have wound dressings, they may come off after 48 hours. If you have skin glue to the wound, this will fall off on its own, do not pick at it. If you have steri strips to the wound, these will fall off on their own, do not pick at them, may trim the edges if needed.    5. BOWEL FUNCTION: A few patients, after this operation, will develop either frequent or loose stools after meals. This usually corrects itself after a few days, to a few weeks. If this occurs, do not worry; it is not unusual and will resolve. Much more common than loose stools, is constipation. The combination of pain medication and decreased activity level can cause constipation in otherwise normal patients. If you feel this is occurring, take a laxative (Milk of Magnesia, Ex-Lax, Senokot, etc.) until the problem has resolved.    6. PAIN MEDICATION: You will be given a prescription for pain medication at discharge. Please take these as directed. It is important to remember not to take medications on an empty stomach as this may cause nausea. You may also take over the counter acetaminophen and/or NSAIDS (ibuprofen, Aleve, Advil, Motrin) per the package instructions.     7.CALL IF YOU HAVE: (1) Fevers to more than 101F, (2) Unusual chest or leg pain, (3) Drainage or fluid from incision that may be foul smelling, increased tenderness or soreness at the wound or the wound edges are no longer together, redness or swelling at the incision site. Please do not hesitate to call with any other questions.    Document Released: 09/26/2009 Document Revised: 11/30/2018 Document Reviewed: 06/05/2017  [employer] Patient Education © 2020 [employer]

## 2022-08-13 NOTE — PROGRESS NOTES
Patient resting comfortably on echevarria  No overt signs of bleeding, HR 70's with soft abdomen. Patient reports decreased in pain and feeling overall better.   AA inhibition 82.7.  Will require 1 unit of platelets if s/s of continued bleeding noted.   Discussed with bedside RN and patient.

## 2022-08-14 NOTE — PROGRESS NOTES
PIV removed. Discharge paperwork reviewed and signed. Discharge education given to patient. All questions answered. Pt has copy of paperwork to keep.

## 2022-08-14 NOTE — CARE PLAN
The patient is Stable - Low risk of patient condition declining or worsening    Shift Goals  Clinical Goals: ambulation, pain control, bowel movement  Patient Goals: bowel movement, increase oral intake  Family Goals: n/a    Progress made toward(s) clinical / shift goals:  Patient denies acute pain issues and has ambulated well. Patient has tolerated resumption of diet and has voided. Patient has received her discharge orders.     Patient is not progressing towards the following goals: n/a

## 2022-08-15 ENCOUNTER — APPOINTMENT (OUTPATIENT)
Dept: RADIOLOGY | Facility: MEDICAL CENTER | Age: 52
DRG: 921 | End: 2022-08-15
Attending: EMERGENCY MEDICINE
Payer: COMMERCIAL

## 2022-08-15 ENCOUNTER — HOSPITAL ENCOUNTER (INPATIENT)
Facility: MEDICAL CENTER | Age: 52
LOS: 1 days | DRG: 921 | End: 2022-08-19
Attending: EMERGENCY MEDICINE | Admitting: SURGERY
Payer: COMMERCIAL

## 2022-08-15 DIAGNOSIS — R06.02 SHORTNESS OF BREATH: ICD-10-CM

## 2022-08-15 DIAGNOSIS — R14.0 ABDOMINAL DISTENSION: ICD-10-CM

## 2022-08-15 DIAGNOSIS — K75.81 NASH (NONALCOHOLIC STEATOHEPATITIS): ICD-10-CM

## 2022-08-15 DIAGNOSIS — T81.9XXA COMPLICATION OF PROCEDURE, INITIAL ENCOUNTER: ICD-10-CM

## 2022-08-15 PROBLEM — R18.8 INTRAABDOMINAL FLUID COLLECTION: Status: ACTIVE | Noted: 2022-08-15

## 2022-08-15 PROBLEM — E66.9 OBESITY (BMI 30-39.9): Status: RESOLVED | Noted: 2019-08-30 | Resolved: 2022-08-15

## 2022-08-15 PROBLEM — K80.00 CHOLECYSTITIS, ACUTE WITH CHOLELITHIASIS: Status: RESOLVED | Noted: 2022-08-12 | Resolved: 2022-08-15

## 2022-08-15 PROBLEM — K81.0 ACUTE CHOLECYSTITIS: Status: RESOLVED | Noted: 2022-08-12 | Resolved: 2022-08-15

## 2022-08-15 LAB
ALBUMIN SERPL BCP-MCNC: 4.2 G/DL (ref 3.2–4.9)
ALBUMIN/GLOB SERPL: 1.3 G/DL
ALP SERPL-CCNC: 86 U/L (ref 30–99)
ALT SERPL-CCNC: 81 U/L (ref 2–50)
ANION GAP SERPL CALC-SCNC: 11 MMOL/L (ref 7–16)
APPEARANCE UR: CLEAR
AST SERPL-CCNC: 33 U/L (ref 12–45)
BASOPHILS # BLD AUTO: 0.5 % (ref 0–1.8)
BASOPHILS # BLD: 0.05 K/UL (ref 0–0.12)
BILIRUB SERPL-MCNC: 0.5 MG/DL (ref 0.1–1.5)
BILIRUB UR QL STRIP.AUTO: NEGATIVE
BUN SERPL-MCNC: 8 MG/DL (ref 8–22)
CALCIUM SERPL-MCNC: 9.2 MG/DL (ref 8.5–10.5)
CHLORIDE SERPL-SCNC: 101 MMOL/L (ref 96–112)
CO2 SERPL-SCNC: 27 MMOL/L (ref 20–33)
COLOR UR: YELLOW
CREAT SERPL-MCNC: 0.48 MG/DL (ref 0.5–1.4)
D DIMER PPP IA.FEU-MCNC: 2 UG/ML (FEU) (ref 0–0.5)
EKG IMPRESSION: NORMAL
EOSINOPHIL # BLD AUTO: 0.24 K/UL (ref 0–0.51)
EOSINOPHIL NFR BLD: 2.5 % (ref 0–6.9)
ERYTHROCYTE [DISTWIDTH] IN BLOOD BY AUTOMATED COUNT: 41.2 FL (ref 35.9–50)
FLUAV RNA SPEC QL NAA+PROBE: NEGATIVE
FLUBV RNA SPEC QL NAA+PROBE: NEGATIVE
GFR SERPLBLD CREATININE-BSD FMLA CKD-EPI: 114 ML/MIN/1.73 M 2
GLOBULIN SER CALC-MCNC: 3.2 G/DL (ref 1.9–3.5)
GLUCOSE SERPL-MCNC: 101 MG/DL (ref 65–99)
GLUCOSE UR STRIP.AUTO-MCNC: NEGATIVE MG/DL
HCG SERPL QL: NEGATIVE
HCT VFR BLD AUTO: 40.2 % (ref 37–47)
HGB BLD-MCNC: 13.4 G/DL (ref 12–16)
IMM GRANULOCYTES # BLD AUTO: 0.03 K/UL (ref 0–0.11)
IMM GRANULOCYTES NFR BLD AUTO: 0.3 % (ref 0–0.9)
KETONES UR STRIP.AUTO-MCNC: 15 MG/DL
LACTATE SERPL-SCNC: 0.9 MMOL/L (ref 0.5–2)
LEUKOCYTE ESTERASE UR QL STRIP.AUTO: NEGATIVE
LIPASE SERPL-CCNC: 35 U/L (ref 11–82)
LYMPHOCYTES # BLD AUTO: 2.69 K/UL (ref 1–4.8)
LYMPHOCYTES NFR BLD: 28.3 % (ref 22–41)
MCH RBC QN AUTO: 28.8 PG (ref 27–33)
MCHC RBC AUTO-ENTMCNC: 33.3 G/DL (ref 33.6–35)
MCV RBC AUTO: 86.5 FL (ref 81.4–97.8)
MICRO URNS: ABNORMAL
MONOCYTES # BLD AUTO: 0.79 K/UL (ref 0–0.85)
MONOCYTES NFR BLD AUTO: 8.3 % (ref 0–13.4)
NEUTROPHILS # BLD AUTO: 5.72 K/UL (ref 2–7.15)
NEUTROPHILS NFR BLD: 60.1 % (ref 44–72)
NITRITE UR QL STRIP.AUTO: NEGATIVE
NRBC # BLD AUTO: 0 K/UL
NRBC BLD-RTO: 0 /100 WBC
PATHOLOGY CONSULT NOTE: NORMAL
PH UR STRIP.AUTO: 7.5 [PH] (ref 5–8)
PLATELET # BLD AUTO: 278 K/UL (ref 164–446)
PMV BLD AUTO: 10.4 FL (ref 9–12.9)
POTASSIUM SERPL-SCNC: 3.5 MMOL/L (ref 3.6–5.5)
PROT SERPL-MCNC: 7.4 G/DL (ref 6–8.2)
PROT UR QL STRIP: NEGATIVE MG/DL
RBC # BLD AUTO: 4.65 M/UL (ref 4.2–5.4)
RBC UR QL AUTO: NEGATIVE
RSV RNA SPEC QL NAA+PROBE: NEGATIVE
SARS-COV-2 RNA RESP QL NAA+PROBE: NOTDETECTED
SODIUM SERPL-SCNC: 139 MMOL/L (ref 135–145)
SP GR UR STRIP.AUTO: 1.02
SPECIMEN SOURCE: NORMAL
TROPONIN T SERPL-MCNC: <6 NG/L (ref 6–19)
UROBILINOGEN UR STRIP.AUTO-MCNC: 0.2 MG/DL
WBC # BLD AUTO: 9.5 K/UL (ref 4.8–10.8)

## 2022-08-15 PROCEDURE — 85379 FIBRIN DEGRADATION QUANT: CPT

## 2022-08-15 PROCEDURE — 93005 ELECTROCARDIOGRAM TRACING: CPT

## 2022-08-15 PROCEDURE — 85025 COMPLETE CBC W/AUTO DIFF WBC: CPT

## 2022-08-15 PROCEDURE — 80053 COMPREHEN METABOLIC PANEL: CPT

## 2022-08-15 PROCEDURE — 71275 CT ANGIOGRAPHY CHEST: CPT

## 2022-08-15 PROCEDURE — 84484 ASSAY OF TROPONIN QUANT: CPT

## 2022-08-15 PROCEDURE — 36415 COLL VENOUS BLD VENIPUNCTURE: CPT

## 2022-08-15 PROCEDURE — 81003 URINALYSIS AUTO W/O SCOPE: CPT

## 2022-08-15 PROCEDURE — 99024 POSTOP FOLLOW-UP VISIT: CPT | Performed by: SURGERY

## 2022-08-15 PROCEDURE — 93005 ELECTROCARDIOGRAM TRACING: CPT | Performed by: EMERGENCY MEDICINE

## 2022-08-15 PROCEDURE — 700102 HCHG RX REV CODE 250 W/ 637 OVERRIDE(OP): Performed by: SURGERY

## 2022-08-15 PROCEDURE — 83605 ASSAY OF LACTIC ACID: CPT

## 2022-08-15 PROCEDURE — C9803 HOPD COVID-19 SPEC COLLECT: HCPCS | Performed by: EMERGENCY MEDICINE

## 2022-08-15 PROCEDURE — 700105 HCHG RX REV CODE 258: Performed by: SURGERY

## 2022-08-15 PROCEDURE — 71045 X-RAY EXAM CHEST 1 VIEW: CPT

## 2022-08-15 PROCEDURE — A9270 NON-COVERED ITEM OR SERVICE: HCPCS | Performed by: SURGERY

## 2022-08-15 PROCEDURE — G0378 HOSPITAL OBSERVATION PER HR: HCPCS

## 2022-08-15 PROCEDURE — 74177 CT ABD & PELVIS W/CONTRAST: CPT

## 2022-08-15 PROCEDURE — 84703 CHORIONIC GONADOTROPIN ASSAY: CPT

## 2022-08-15 PROCEDURE — 700117 HCHG RX CONTRAST REV CODE 255: Performed by: EMERGENCY MEDICINE

## 2022-08-15 PROCEDURE — 99285 EMERGENCY DEPT VISIT HI MDM: CPT

## 2022-08-15 PROCEDURE — 83690 ASSAY OF LIPASE: CPT

## 2022-08-15 PROCEDURE — 0241U HCHG SARS-COV-2 COVID-19 NFCT DS RESP RNA 4 TRGT MIC: CPT

## 2022-08-15 RX ORDER — POLYETHYLENE GLYCOL 3350 17 G/17G
1 POWDER, FOR SOLUTION ORAL 2 TIMES DAILY
Status: DISCONTINUED | OUTPATIENT
Start: 2022-08-15 | End: 2022-08-19 | Stop reason: HOSPADM

## 2022-08-15 RX ORDER — ENOXAPARIN SODIUM 100 MG/ML
40 INJECTION SUBCUTANEOUS DAILY
Status: DISCONTINUED | OUTPATIENT
Start: 2022-08-16 | End: 2022-08-19 | Stop reason: HOSPADM

## 2022-08-15 RX ORDER — ONDANSETRON 4 MG/1
4 TABLET, ORALLY DISINTEGRATING ORAL EVERY 4 HOURS PRN
Status: DISCONTINUED | OUTPATIENT
Start: 2022-08-15 | End: 2022-08-19 | Stop reason: HOSPADM

## 2022-08-15 RX ORDER — OXYCODONE HYDROCHLORIDE 10 MG/1
10 TABLET ORAL
Status: DISCONTINUED | OUTPATIENT
Start: 2022-08-15 | End: 2022-08-19

## 2022-08-15 RX ORDER — SODIUM CHLORIDE, SODIUM LACTATE, POTASSIUM CHLORIDE, CALCIUM CHLORIDE 600; 310; 30; 20 MG/100ML; MG/100ML; MG/100ML; MG/100ML
INJECTION, SOLUTION INTRAVENOUS CONTINUOUS
Status: DISCONTINUED | OUTPATIENT
Start: 2022-08-15 | End: 2022-08-18

## 2022-08-15 RX ORDER — OXYCODONE HYDROCHLORIDE 5 MG/1
5 TABLET ORAL
Status: DISCONTINUED | OUTPATIENT
Start: 2022-08-15 | End: 2022-08-19

## 2022-08-15 RX ORDER — ONDANSETRON 2 MG/ML
4 INJECTION INTRAMUSCULAR; INTRAVENOUS EVERY 4 HOURS PRN
Status: DISCONTINUED | OUTPATIENT
Start: 2022-08-15 | End: 2022-08-19 | Stop reason: HOSPADM

## 2022-08-15 RX ORDER — ENEMA 19; 7 G/133ML; G/133ML
1 ENEMA RECTAL
Status: DISCONTINUED | OUTPATIENT
Start: 2022-08-15 | End: 2022-08-19 | Stop reason: HOSPADM

## 2022-08-15 RX ORDER — BISACODYL 10 MG
10 SUPPOSITORY, RECTAL RECTAL
Status: DISCONTINUED | OUTPATIENT
Start: 2022-08-15 | End: 2022-08-19 | Stop reason: HOSPADM

## 2022-08-15 RX ORDER — ACETAMINOPHEN 500 MG
1000 TABLET ORAL EVERY 6 HOURS
Status: DISCONTINUED | OUTPATIENT
Start: 2022-08-16 | End: 2022-08-19 | Stop reason: HOSPADM

## 2022-08-15 RX ORDER — ACETAMINOPHEN 500 MG
1000 TABLET ORAL EVERY 6 HOURS PRN
Status: DISCONTINUED | OUTPATIENT
Start: 2022-08-21 | End: 2022-08-19 | Stop reason: HOSPADM

## 2022-08-15 RX ORDER — HYDROMORPHONE HYDROCHLORIDE 1 MG/ML
0.5 INJECTION, SOLUTION INTRAMUSCULAR; INTRAVENOUS; SUBCUTANEOUS
Status: DISCONTINUED | OUTPATIENT
Start: 2022-08-15 | End: 2022-08-19

## 2022-08-15 RX ADMIN — IOHEXOL 100 ML: 350 INJECTION, SOLUTION INTRAVENOUS at 21:55

## 2022-08-15 RX ADMIN — POLYETHYLENE GLYCOL 3350 1 PACKET: 17 POWDER, FOR SOLUTION ORAL at 23:45

## 2022-08-15 RX ADMIN — SODIUM CHLORIDE, POTASSIUM CHLORIDE, SODIUM LACTATE AND CALCIUM CHLORIDE: 600; 310; 30; 20 INJECTION, SOLUTION INTRAVENOUS at 23:45

## 2022-08-15 ASSESSMENT — LIFESTYLE VARIABLES: DO YOU DRINK ALCOHOL: NO

## 2022-08-15 ASSESSMENT — FIBROSIS 4 INDEX: FIB4 SCORE: 1.58

## 2022-08-15 NOTE — ED TRIAGE NOTES
"Chief Complaint   Patient presents with    Shortness of Breath     Pt states she can't take a deep breath. Pt also states things taste different and would like a COVID test.     Abdominal Pain     RUQ pain    Post-Op Complications     Pt had a lap kirby on Saturday. Pt states she has been getting progressively more distended since procedure. Pt states she had a bm on Friday and that was the only day. Pt has been taking dulcolax.      Pt also endorses HA.     BP (!) 147/92   Pulse (!) 111   Temp 37.8 °C (100 °F) (Temporal)   Resp 18   Ht 1.575 m (5' 2\")   Wt 84.1 kg (185 lb 6.5 oz)   SpO2 93%   BMI 33.91 kg/m²     "

## 2022-08-16 PROBLEM — Z78.9 NO CONTRAINDICATION TO DEEP VEIN THROMBOSIS (DVT) PROPHYLAXIS: Status: ACTIVE | Noted: 2022-08-16

## 2022-08-16 LAB
ANION GAP SERPL CALC-SCNC: 11 MMOL/L (ref 7–16)
BASOPHILS # BLD AUTO: 0.3 % (ref 0–1.8)
BASOPHILS # BLD: 0.03 K/UL (ref 0–0.12)
BUN SERPL-MCNC: 9 MG/DL (ref 8–22)
CALCIUM SERPL-MCNC: 8.8 MG/DL (ref 8.5–10.5)
CHLORIDE SERPL-SCNC: 102 MMOL/L (ref 96–112)
CO2 SERPL-SCNC: 27 MMOL/L (ref 20–33)
CREAT SERPL-MCNC: 0.44 MG/DL (ref 0.5–1.4)
EOSINOPHIL # BLD AUTO: 0.29 K/UL (ref 0–0.51)
EOSINOPHIL NFR BLD: 3.1 % (ref 0–6.9)
ERYTHROCYTE [DISTWIDTH] IN BLOOD BY AUTOMATED COUNT: 41.7 FL (ref 35.9–50)
GFR SERPLBLD CREATININE-BSD FMLA CKD-EPI: 116 ML/MIN/1.73 M 2
GLUCOSE BLD STRIP.AUTO-MCNC: 87 MG/DL (ref 65–99)
GLUCOSE BLD STRIP.AUTO-MCNC: 97 MG/DL (ref 65–99)
GLUCOSE SERPL-MCNC: 77 MG/DL (ref 65–99)
HCT VFR BLD AUTO: 38.1 % (ref 37–47)
HGB BLD-MCNC: 13 G/DL (ref 12–16)
IMM GRANULOCYTES # BLD AUTO: 0.07 K/UL (ref 0–0.11)
IMM GRANULOCYTES NFR BLD AUTO: 0.7 % (ref 0–0.9)
INR PPP: 1.08 (ref 0.87–1.13)
LYMPHOCYTES # BLD AUTO: 2.05 K/UL (ref 1–4.8)
LYMPHOCYTES NFR BLD: 21.8 % (ref 22–41)
MCH RBC QN AUTO: 29.6 PG (ref 27–33)
MCHC RBC AUTO-ENTMCNC: 34.1 G/DL (ref 33.6–35)
MCV RBC AUTO: 86.8 FL (ref 81.4–97.8)
MONOCYTES # BLD AUTO: 0.7 K/UL (ref 0–0.85)
MONOCYTES NFR BLD AUTO: 7.4 % (ref 0–13.4)
NEUTROPHILS # BLD AUTO: 6.27 K/UL (ref 2–7.15)
NEUTROPHILS NFR BLD: 66.7 % (ref 44–72)
NRBC # BLD AUTO: 0 K/UL
NRBC BLD-RTO: 0 /100 WBC
PLATELET # BLD AUTO: 259 K/UL (ref 164–446)
PMV BLD AUTO: 10 FL (ref 9–12.9)
POTASSIUM SERPL-SCNC: 3.8 MMOL/L (ref 3.6–5.5)
PROTHROMBIN TIME: 13.9 SEC (ref 12–14.6)
RBC # BLD AUTO: 4.39 M/UL (ref 4.2–5.4)
SODIUM SERPL-SCNC: 140 MMOL/L (ref 135–145)
WBC # BLD AUTO: 9.4 K/UL (ref 4.8–10.8)

## 2022-08-16 PROCEDURE — A9270 NON-COVERED ITEM OR SERVICE: HCPCS | Performed by: SURGERY

## 2022-08-16 PROCEDURE — 82962 GLUCOSE BLOOD TEST: CPT

## 2022-08-16 PROCEDURE — G0378 HOSPITAL OBSERVATION PER HR: HCPCS

## 2022-08-16 PROCEDURE — 85610 PROTHROMBIN TIME: CPT

## 2022-08-16 PROCEDURE — 80048 BASIC METABOLIC PNL TOTAL CA: CPT

## 2022-08-16 PROCEDURE — 94669 MECHANICAL CHEST WALL OSCILL: CPT

## 2022-08-16 PROCEDURE — 700102 HCHG RX REV CODE 250 W/ 637 OVERRIDE(OP): Performed by: SURGERY

## 2022-08-16 PROCEDURE — 99024 POSTOP FOLLOW-UP VISIT: CPT

## 2022-08-16 PROCEDURE — 85025 COMPLETE CBC W/AUTO DIFF WBC: CPT

## 2022-08-16 RX ORDER — ALPRAZOLAM 0.25 MG/1
0.25 TABLET ORAL NIGHTLY PRN
Status: DISCONTINUED | OUTPATIENT
Start: 2022-08-16 | End: 2022-08-19 | Stop reason: HOSPADM

## 2022-08-16 RX ADMIN — ACETAMINOPHEN 1000 MG: 500 TABLET ORAL at 05:35

## 2022-08-16 RX ADMIN — ALPRAZOLAM 0.25 MG: 0.25 TABLET ORAL at 21:58

## 2022-08-16 RX ADMIN — MAGNESIUM HYDROXIDE 30 ML: 1200 LIQUID ORAL at 05:39

## 2022-08-16 RX ADMIN — ACETAMINOPHEN 1000 MG: 500 TABLET ORAL at 00:00

## 2022-08-16 ASSESSMENT — PATIENT HEALTH QUESTIONNAIRE - PHQ9
8. MOVING OR SPEAKING SO SLOWLY THAT OTHER PEOPLE COULD HAVE NOTICED. OR THE OPPOSITE, BEING SO FIGETY OR RESTLESS THAT YOU HAVE BEEN MOVING AROUND A LOT MORE THAN USUAL: NOT AT ALL
9. THOUGHTS THAT YOU WOULD BE BETTER OFF DEAD, OR OF HURTING YOURSELF: NOT AT ALL
2. FEELING DOWN, DEPRESSED, IRRITABLE, OR HOPELESS: NOT AT ALL
7. TROUBLE CONCENTRATING ON THINGS, SUCH AS READING THE NEWSPAPER OR WATCHING TELEVISION: NOT AT ALL
4. FEELING TIRED OR HAVING LITTLE ENERGY: NOT AT ALL
SUM OF ALL RESPONSES TO PHQ9 QUESTIONS 1 AND 2: 0
SUM OF ALL RESPONSES TO PHQ QUESTIONS 1-9: 0
5. POOR APPETITE OR OVEREATING: NOT AT ALL
3. TROUBLE FALLING OR STAYING ASLEEP OR SLEEPING TOO MUCH: NOT AT ALL
1. LITTLE INTEREST OR PLEASURE IN DOING THINGS: NOT AT ALL
6. FEELING BAD ABOUT YOURSELF - OR THAT YOU ARE A FAILURE OR HAVE LET YOURSELF OR YOUR FAMILY DOWN: NOT AL ALL

## 2022-08-16 ASSESSMENT — ENCOUNTER SYMPTOMS
MYALGIAS: 0
SHORTNESS OF BREATH: 0
BRUISES/BLEEDS EASILY: 0
WEAKNESS: 0
ABDOMINAL PAIN: 1
FEVER: 0
BLURRED VISION: 0
NAUSEA: 0
VOMITING: 0
SHORTNESS OF BREATH: 1
DIZZINESS: 0
MUSCULOSKELETAL NEGATIVE: 1
DIAPHORESIS: 0
NEUROLOGICAL NEGATIVE: 1
HEADACHES: 0
CHILLS: 0
PALPITATIONS: 0
COUGH: 0

## 2022-08-16 ASSESSMENT — COPD QUESTIONNAIRES
DO YOU EVER COUGH UP ANY MUCUS OR PHLEGM?: NO/ONLY WITH OCCASIONAL COLDS OR INFECTIONS
DURING THE PAST 4 WEEKS HOW MUCH DID YOU FEEL SHORT OF BREATH: NONE/LITTLE OF THE TIME
COPD SCREENING SCORE: 3
HAVE YOU SMOKED AT LEAST 100 CIGARETTES IN YOUR ENTIRE LIFE: YES

## 2022-08-16 ASSESSMENT — PAIN DESCRIPTION - PAIN TYPE: TYPE: SURGICAL PAIN

## 2022-08-16 ASSESSMENT — FIBROSIS 4 INDEX: FIB4 SCORE: 0.69

## 2022-08-16 NOTE — ED NOTES
Pt awoken for AM medications. Ambulatory to restroom with steady gait. Denies significant abdominal pain. Medicated w/ acetaminophen per MAR

## 2022-08-16 NOTE — ED NOTES
Report given to CDU RN Jen.  Patient to floor via wheelchair accompanied by CDU RN and family. All belongings accounted for.

## 2022-08-16 NOTE — ED NOTES
Assumed care of patient. Pt resting in bed. States abdominal pain is tolerable-2/10. Declines additional pain medication at this time. Bowel sounds active in all quadrants. Incisions well approximated. No additional needs at this time. Informed of NPO status

## 2022-08-16 NOTE — ED NOTES
"Med rec completed historically 08/13/2022    \"Med Rec completed per patient   Allergies reviewed  No ORAL antibiotics in last 30 days\"  "

## 2022-08-16 NOTE — ED PROVIDER NOTES
ED PROVIDER NOTE    Scribed for Sana Hamlin D.O. by Justyna Conroy. 8/15/2022, 10:50 PM.    This is an addendum to the note on Sury Vidal. For further details and full chart entry, see the previously signed ED Provider Note written by Dr. Becky Duggan (ERP).      9:00 PM - I discussed the patient's case with Dr. Duggan (ERP) who will transfer care of the patient to me at this time.        10:50 PM - Reevaluated the patient. Discussed resulted studies.  Results for orders placed or performed during the hospital encounter of 08/15/22   CBC with Differential   Result Value Ref Range    WBC 9.5 4.8 - 10.8 K/uL    RBC 4.65 4.20 - 5.40 M/uL    Hemoglobin 13.4 12.0 - 16.0 g/dL    Hematocrit 40.2 37.0 - 47.0 %    MCV 86.5 81.4 - 97.8 fL    MCH 28.8 27.0 - 33.0 pg    MCHC 33.3 (L) 33.6 - 35.0 g/dL    RDW 41.2 35.9 - 50.0 fL    Platelet Count 278 164 - 446 K/uL    MPV 10.4 9.0 - 12.9 fL    Neutrophils-Polys 60.10 44.00 - 72.00 %    Lymphocytes 28.30 22.00 - 41.00 %    Monocytes 8.30 0.00 - 13.40 %    Eosinophils 2.50 0.00 - 6.90 %    Basophils 0.50 0.00 - 1.80 %    Immature Granulocytes 0.30 0.00 - 0.90 %    Nucleated RBC 0.00 /100 WBC    Neutrophils (Absolute) 5.72 2.00 - 7.15 K/uL    Lymphs (Absolute) 2.69 1.00 - 4.80 K/uL    Monos (Absolute) 0.79 0.00 - 0.85 K/uL    Eos (Absolute) 0.24 0.00 - 0.51 K/uL    Baso (Absolute) 0.05 0.00 - 0.12 K/uL    Immature Granulocytes (abs) 0.03 0.00 - 0.11 K/uL    NRBC (Absolute) 0.00 K/uL   Comp Metabolic Panel   Result Value Ref Range    Sodium 139 135 - 145 mmol/L    Potassium 3.5 (L) 3.6 - 5.5 mmol/L    Chloride 101 96 - 112 mmol/L    Co2 27 20 - 33 mmol/L    Anion Gap 11.0 7.0 - 16.0    Glucose 101 (H) 65 - 99 mg/dL    Bun 8 8 - 22 mg/dL    Creatinine 0.48 (L) 0.50 - 1.40 mg/dL    Calcium 9.2 8.5 - 10.5 mg/dL    AST(SGOT) 33 12 - 45 U/L    ALT(SGPT) 81 (H) 2 - 50 U/L    Alkaline Phosphatase 86 30 - 99 U/L    Total Bilirubin 0.5 0.1 - 1.5 mg/dL    Albumin 4.2 3.2 - 4.9 g/dL     Total Protein 7.4 6.0 - 8.2 g/dL    Globulin 3.2 1.9 - 3.5 g/dL    A-G Ratio 1.3 g/dL   Lipase   Result Value Ref Range    Lipase 35 11 - 82 U/L   Urinalysis    Specimen: Urine   Result Value Ref Range    Color Yellow     Character Clear     Specific Gravity 1.020 <1.035    Ph 7.5 5.0 - 8.0    Glucose Negative Negative mg/dL    Ketones 15 (A) Negative mg/dL    Protein Negative Negative mg/dL    Bilirubin Negative Negative    Urobilinogen, Urine 0.2 Negative    Nitrite Negative Negative    Leukocyte Esterase Negative Negative    Occult Blood Negative Negative    Micro Urine Req see below    ESTIMATED GFR   Result Value Ref Range    GFR (CKD-EPI) 114 >60 mL/min/1.73 m 2   COV-2, FLU A/B, AND RSV BY PCR (2-4 HOURS CEPHEID): Collect NP swab in VTM    Specimen: Respirate   Result Value Ref Range    Influenza virus A RNA Negative Negative    Influenza virus B, PCR Negative Negative    RSV, PCR Negative Negative    SARS-CoV-2 by PCR NotDetected     SARS-CoV-2 Source NP Swab    TROPONIN   Result Value Ref Range    Troponin T <6 6 - 19 ng/L   HCG QUAL SERUM   Result Value Ref Range    Beta-Hcg Qualitative Serum Negative Negative   D-DIMER   Result Value Ref Range    D-Dimer Screen 2.00 (H) 0.00 - 0.50 ug/mL (FEU)   LACTIC ACID   Result Value Ref Range    Lactic Acid 0.9 0.5 - 2.0 mmol/L   EKG   Result Value Ref Range    Report       [facility] Emergency Dept.    Test Date:  2022-08-15  Pt Name:    ALVIN MAY               Department: ER  MRN:        [id number]                      Room:  Gender:     Female                       Technician: [id number]  :        1970                   Requested By:ER TRIAGE PROTOCOL  Order #:    [id number]                    Marcial DUNNE: Becky Duggan    Measurements  Intervals                                Axis  Rate:       107                          P:          54  VA:         144                          QRS:        40  QRSD:       90                           T:           8  QT:         340  QTc:        454    Interpretive Statements  SINUS TACHYCARDIA  PROBABLE LEFT ATRIAL ABNORMALITY  RSR' IN V1 OR V2, RIGHT VCD OR RVH  Compared to ECG 08/12/2022 12:41:32  Right ventricular hypertrophy now present  RSR' in V1 or V2 now present  Sinus rhythm no longer present  Electronically Signed On 8- 17:52:09 PDT by Becky Duggan     POCT glucose device results   Result Value Ref Range    POC Glucose, Blood 97 65 - 99 mg/dL     CT-CTA CHEST PULMONARY ARTERY W/ RECONS   Final Result         1.  No pulmonary embolus appreciated.   2.  Atherosclerosis and atherosclerotic coronary artery disease.      CT-ABDOMEN-PELVIS WITH   Final Result         1.  Large collection of intermediate density fluid in the gallbladder fossa with stippled air, could represent postop hematoma and/or evolving abscess.   2.  Hepatomegaly with diffuse low-attenuation changes of the liver, appearance most compatible with hepatic steatosis   3.  Posterior disc osteophyte complex at L5/S1 results in bilateral neural foraminal stenosis   4.  Mild fluid-filled prominence of small bowel suggests component of ileus and/or enteritis   5.  Minimal diverticulosis      DX-CHEST-PORTABLE (1 VIEW)   Final Result      1.  Atelectasis at both lung bases      2.  No other finding        Patient was notified of all of her test results and radiologic studies and she is in agreement to stay in the hospital.  She will require surgical intervention for the fluid has built up in her abdomen.  10:53 PM Consulted with Dr. Sampson, general surgery, about the patient's case.They will admit.    Decision Making:  This is a 52 y.o. female who presents with postoperative complication, shortness of breath secondary to decreased tidal volume due to her abdominal distention.    DISPOSITION:  Patient will be hospitalized by Dr. De Dios in guarded condition.      FINAL IMPRESSION   1. Abdominal distension    2. Shortness of breath    3. Complication of  procedure, initial encounter      IJustyna (Scribe), am scribing for, and in the presence of, Sana Hamlin D.O..    Electronically signed by: Justyna Conroy (Mellibyadiel), 8/15/2022    ISana D.O. personally performed the services described in this documentation, as scribed by Justyna Conroy in my presence, and it is both accurate and complete.    The note accurately reflects work and decisions made by me.  Sana Hamlin D.O.  8/16/2022  3:16 AM

## 2022-08-16 NOTE — CONSULTS
Radiology Consult  Author: PRITESH Hooker Date & Time created: 8/16/2022  2:38 PM   Date of admission  8/15/2022  Note to reader: this note follows the APSO format rather than the historical SOAP format. Assessment and plan located at the top of the note for ease of use.    Chief Complaint  52 y.o. female admitted 8/15/2022 with   Chief Complaint   Patient presents with    Shortness of Breath     Pt states she can't take a deep breath. Pt also states things taste different and would like a COVID test.     Abdominal Pain     RUQ pain    Post-Op Complications     Pt had a lap kirby on Saturday. Pt states she has been getting progressively more distended since procedure. Pt states she had a bm on Friday and that was the only day. Pt has been taking dulcolax.        HPI   52 year-old White woman who presents to the Emergency Department with a one- day history of progressive dyspnea and right-upper quadrant pain. She underwent laparoscopic cholecystectomy and liver biopsy on 8/12, she was discharged 8/13 and doing well at home until this morning. Over the course of the day she has noted worsening abdominal bloating, right-upper quadrant pain, and dyspnea causing her to seek evaluation in the [facility] ER. CT scan revealed a large intermediate density fluid collection in the gallbladder fossa and did not show a PE. She is passing flatus and tolerating a diet, although her appetite has decreased today. She has not yet passed stool post-operatively. She denies fevers, chills, nausea, vomiting, cough.    Consulted for GB fossa drain placement     Assessment/Plan  Interval History   Principal Problem:    Intraabdominal fluid collection  Active Problems:    Obesity    No contraindication to deep vein thrombosis (DVT) prophylaxis      Plan IR  - GB fossa drain placement tomorrow 8/17   - NPO at midnight   - Hold blood thinners/ASA  Went over the risks, benefits, and alternatives of aforementioned procedures were  discussed with patient in detail.  Patient was given opportunities to ask questions and discuss other options.  Risks including but not limited to perforation, infection, bleeding,  possible need for surgery(ies) and/or interventional radiology, possible need for repeat procedure(s) and/or additional testing, hospitalization possibly prolonged, pulmonary evenst, aspiration, hypoxia, medication (s) and/or moderate sedation reaction(s), indefinite diagnosis, discomfort/pain, unsuccessful and/or incomplete procedure,  damage to adjacent organs/structure and/or vascular, and other adverse event(s) possibly life-threatening.  Interactive discussion was undertaken with Layman's terms.  I answered questions in full and to satisfaction.  Patient stated understanding and acceptance of these risks, and wished to proceed.      [id number]  IR:            Review of Systems  Physical Exam   Review of Systems   Constitutional:  Negative for chills and fever.   HENT:  Negative for hearing loss.    Eyes:  Negative for blurred vision.   Respiratory:  Negative for cough and shortness of breath.    Cardiovascular:  Negative for chest pain and palpitations.   Gastrointestinal:  Positive for abdominal pain. Negative for vomiting.   Genitourinary:  Negative for dysuria.   Musculoskeletal:  Negative for myalgias.   Skin:  Negative for rash.   Neurological:  Negative for dizziness, weakness and headaches.   Endo/Heme/Allergies:  Does not bruise/bleed easily.    Vitals:    08/16/22 1122   BP: 113/69   Pulse: 89   Resp: 18   Temp: 36.5 °C (97.7 °F)   SpO2: 91%      Physical Exam  Constitutional:       Appearance: Normal appearance. She is obese.   HENT:      Head: Normocephalic.      Nose: Nose normal.      Mouth/Throat:      Mouth: Mucous membranes are moist.   Eyes:      Pupils: Pupils are equal, round, and reactive to light.   Cardiovascular:      Rate and Rhythm: Normal rate.   Pulmonary:      Effort: Pulmonary effort is normal. No  respiratory distress.   Abdominal:      Palpations: Abdomen is soft.      Tenderness: There is abdominal tenderness.      Comments: X4 surgical wounds healing well    Musculoskeletal:         General: No tenderness or deformity.      Cervical back: Normal range of motion.   Skin:     General: Skin is warm and dry.      Capillary Refill: Capillary refill takes less than 2 seconds.      Coloration: Skin is not jaundiced or pale.   Neurological:      General: No focal deficit present.      Mental Status: She is alert and oriented to person, place, and time.      Motor: No weakness.   Psychiatric:         Mood and Affect: Mood normal.         Behavior: Behavior normal.           Labs    Recent Labs     08/15/22  1614   WBC 9.5   RBC 4.65   HEMOGLOBIN 13.4   HEMATOCRIT 40.2   MCV 86.5   MCH 28.8   MCHC 33.3*   RDW 41.2   PLATELETCT 278   MPV 10.4     Recent Labs     08/15/22  1614   SODIUM 139   POTASSIUM 3.5*   CHLORIDE 101   CO2 27   GLUCOSE 101*   BUN 8   CREATININE 0.48*   CALCIUM 9.2     CT-CTA CHEST PULMONARY ARTERY W/ RECONS   Final Result         1.  No pulmonary embolus appreciated.   2.  Atherosclerosis and atherosclerotic coronary artery disease.      CT-ABDOMEN-PELVIS WITH   Final Result         1.  Large collection of intermediate density fluid in the gallbladder fossa with stippled air, could represent postop hematoma and/or evolving abscess.   2.  Hepatomegaly with diffuse low-attenuation changes of the liver, appearance most compatible with hepatic steatosis   3.  Posterior disc osteophyte complex at L5/S1 results in bilateral neural foraminal stenosis   4.  Mild fluid-filled prominence of small bowel suggests component of ileus and/or enteritis   5.  Minimal diverticulosis      DX-CHEST-PORTABLE (1 VIEW)   Final Result      1.  Atelectasis at both lung bases      2.  No other finding      CT-DRAIN-PERITONEAL    (Results Pending)     Recent Labs     08/15/22  1614   SODIUM 139   POTASSIUM 3.5*   CHLORIDE  101   CO2 27   GLUCOSE 101*   BUN 8     INR   Date Value Ref Range Status   08/16/2022 1.08 0.87 - 1.13 Final     Comment:     INR - Non-therapeutic Reference Range: 0.87-1.13  INR - Therapeutic Reference Range: 2.0-4.0       No results found for: POCINR   No intake or output data in the 24 hours ending 08/16/22 1438   Labs not explicitly included in this progress note were reviewed by the author. Radiology/imaging not explicitly included in this progress note was reviewed by the author.     Past Medical History:   Diagnosis Date    Anxiety     Depression     Fibroid 08/01/2019    estimated    GERD (gastroesophageal reflux disease)     Hemorrhoids 10/01/2014    High cholesterol     Tachycardia     Thyroid nodule         Home Medications    Medication Sig Taking? Last Dose Authorizing Provider   acetaminophen (TYLENOL) 325 MG Tab Take 2 Tablets by mouth every 6 hours as needed for Mild Pain.  TAD at [facility] Ally GENTILE Alec, A.P.N.   oxyCODONE immediate-release (ROXICODONE) 5 MG Tab Take 1 Tablet by mouth every 6 hours as needed for Severe Pain for up to 3 days.  UNKN at [facility] Ally Virgenrman, A.P.N.   fexofenadine (ALLEGRA) 180 MG tablet Take 180 mg by mouth 1 time a day as needed (Allergies).  UNKN at [facility]   atorvastatin (LIPITOR) 20 MG Tab Take 1 Tablet by mouth every evening.  TAD at [facility] Edelmira Saucedo M.D.     I have performed a physical exam and reviewed and updated ROS and Plan today (8/16/2022).     15 minutes in directly providing and coordinating care and extensive data review.  No time overlap and excludes procedures.

## 2022-08-16 NOTE — ED NOTES
Pt medicated per MAR, FSBG checked, WNL. Report given to Esther MARTÍNEZ and p moved from red 2H to yellow 65.

## 2022-08-16 NOTE — ED PROVIDER NOTES
"ED Provider Note    CHIEF COMPLAINT  Shortness of breath, abdominal distention    HPI  Sury Vidal is a 52 y.o. female who presents to the emergency department for evaluation of shortness of breath and abdominal distention.  The patient states that she had a laparoscopic cholecystectomy on 8/12 by Dr. Calzada.  She states that she felt a little short of breath after the surgery but that it became significantly worse today.  She is also noted some abdominal distention.  She denies any pain other than her expected postsurgical pain.  She has not had any fevers.  She states that her taste is \"off\".  She has not had any significant coughing or hemoptysis.  She states that she had 3 watery bowel movements yesterday.  She denies any dysuria or hematuria.  She has no history of blood clots.  She is not on any OCPs.  She denies any calf tenderness or swelling.    REVIEW OF SYSTEMS  See HPI for further details. All other systems are negative.     PAST MEDICAL HISTORY   has a past medical history of Anxiety, Depression, Fibroid, GERD (gastroesophageal reflux disease), Hemorrhoids (10/1/2014), High cholesterol, Tachycardia, and Thyroid nodule.    SOCIAL HISTORY  Social History     Tobacco Use    Smoking status: Former     Packs/day: 0.25     Years: 5.00     Pack years: 1.25     Types: Cigarettes     Quit date: 11/27/2011     Years since quitting: 10.7    Smokeless tobacco: Never   Vaping Use    Vaping Use: Never used   Substance and Sexual Activity    Alcohol use: No     Comment: few drinks a year    Drug use: No    Sexual activity: Yes     Partners: Male       SURGICAL HISTORY   has a past surgical history that includes salpingo-oophorectomy, unilateral; bartholin gland excision; kirby by laparoscopy (N/A, 8/12/2022); and liver biopsy (N/A, 8/12/2022).    CURRENT MEDICATIONS  Home Medications    **Home medications have not yet been reviewed for this encounter**         ALLERGIES  Allergies   Allergen Reactions    " "Codeine Itching     Pt states that she is sensative       PHYSICAL EXAM  VITAL SIGNS: BP (!) 147/87   Pulse 100   Temp 36.7 °C (98 °F) (Temporal)   Resp 18   Ht 1.575 m (5' 2\")   Wt 84.1 kg (185 lb 6.5 oz)   LMP 04/20/2017   SpO2 95%   BMI 33.91 kg/m²    Constitutional: Alert and in no apparent distress.  HENT: Normocephalic atraumatic. Bilateral external ears normal. Nose normal. Mucous membranes are moist.  Eyes: Pupils are equal and reactive. Conjunctiva normal. Non-icteric sclera.   Neck: Normal range of motion without tenderness. Supple. No meningeal signs.  Cardiovascular: Tachycardic rate and regular rhythm. No murmurs, gallops or rubs.  Thorax & Lungs: Breath sounds are clear to auscultation bilaterally. No wheezing, rhonchi or rales.  Abdomen: Soft and nontender.  There is distention noted.  Surgical incisions appear to be healing well with no surrounding erythema, induration, or discharge.    Skin: Warm and dry. No rashes are noted.  Back: No bony tenderness, No CVA tenderness.   Extremities: 2+ peripheral pulses. Cap refill is less than 2 seconds. No edema, cyanosis, or clubbing.  Musculoskeletal: Good range of motion in all major joints. No tenderness to palpation or major deformities noted.   Neurologic: Alert and oriented ×3. The patient moves all 4 extremities and follows commands.  Psychiatric: Affect is normal. Judgment appears to be intact.    DIAGNOSTIC STUDIES / PROCEDURES    LABS  Results for orders placed or performed during the hospital encounter of 08/15/22   CBC with Differential   Result Value Ref Range    WBC 9.5 4.8 - 10.8 K/uL    RBC 4.65 4.20 - 5.40 M/uL    Hemoglobin 13.4 12.0 - 16.0 g/dL    Hematocrit 40.2 37.0 - 47.0 %    MCV 86.5 81.4 - 97.8 fL    MCH 28.8 27.0 - 33.0 pg    MCHC 33.3 (L) 33.6 - 35.0 g/dL    RDW 41.2 35.9 - 50.0 fL    Platelet Count 278 164 - 446 K/uL    MPV 10.4 9.0 - 12.9 fL    Neutrophils-Polys 60.10 44.00 - 72.00 %    Lymphocytes 28.30 22.00 - 41.00 %    " Monocytes 8.30 0.00 - 13.40 %    Eosinophils 2.50 0.00 - 6.90 %    Basophils 0.50 0.00 - 1.80 %    Immature Granulocytes 0.30 0.00 - 0.90 %    Nucleated RBC 0.00 /100 WBC    Neutrophils (Absolute) 5.72 2.00 - 7.15 K/uL    Lymphs (Absolute) 2.69 1.00 - 4.80 K/uL    Monos (Absolute) 0.79 0.00 - 0.85 K/uL    Eos (Absolute) 0.24 0.00 - 0.51 K/uL    Baso (Absolute) 0.05 0.00 - 0.12 K/uL    Immature Granulocytes (abs) 0.03 0.00 - 0.11 K/uL    NRBC (Absolute) 0.00 K/uL   Comp Metabolic Panel   Result Value Ref Range    Sodium 139 135 - 145 mmol/L    Potassium 3.5 (L) 3.6 - 5.5 mmol/L    Chloride 101 96 - 112 mmol/L    Co2 27 20 - 33 mmol/L    Anion Gap 11.0 7.0 - 16.0    Glucose 101 (H) 65 - 99 mg/dL    Bun 8 8 - 22 mg/dL    Creatinine 0.48 (L) 0.50 - 1.40 mg/dL    Calcium 9.2 8.5 - 10.5 mg/dL    AST(SGOT) 33 12 - 45 U/L    ALT(SGPT) 81 (H) 2 - 50 U/L    Alkaline Phosphatase 86 30 - 99 U/L    Total Bilirubin 0.5 0.1 - 1.5 mg/dL    Albumin 4.2 3.2 - 4.9 g/dL    Total Protein 7.4 6.0 - 8.2 g/dL    Globulin 3.2 1.9 - 3.5 g/dL    A-G Ratio 1.3 g/dL   Lipase   Result Value Ref Range    Lipase 35 11 - 82 U/L   Urinalysis    Specimen: Urine   Result Value Ref Range    Color Yellow     Character Clear     Specific Gravity 1.020 <1.035    Ph 7.5 5.0 - 8.0    Glucose Negative Negative mg/dL    Ketones 15 (A) Negative mg/dL    Protein Negative Negative mg/dL    Bilirubin Negative Negative    Urobilinogen, Urine 0.2 Negative    Nitrite Negative Negative    Leukocyte Esterase Negative Negative    Occult Blood Negative Negative    Micro Urine Req see below    ESTIMATED GFR   Result Value Ref Range    GFR (CKD-EPI) 114 >60 mL/min/1.73 m 2   COV-2, FLU A/B, AND RSV BY PCR (2-4 HOURS CEPHEID): Collect NP swab in VTM    Specimen: Respirate   Result Value Ref Range    Influenza virus A RNA Negative Negative    Influenza virus B, PCR Negative Negative    RSV, PCR Negative Negative    SARS-CoV-2 by PCR NotDetected     SARS-CoV-2 Source NP Swab     TROPONIN   Result Value Ref Range    Troponin T <6 6 - 19 ng/L   HCG QUAL SERUM   Result Value Ref Range    Beta-Hcg Qualitative Serum Negative Negative   D-DIMER   Result Value Ref Range    D-Dimer Screen 2.00 (H) 0.00 - 0.50 ug/mL (FEU)   LACTIC ACID   Result Value Ref Range    Lactic Acid 0.9 0.5 - 2.0 mmol/L   EKG   Result Value Ref Range    Report       [facility] Emergency Dept.    Test Date:  2022-08-15  Pt Name:    ALVIN MAY               Department: ER  MRN:        [id number]                      Room:  Gender:     Female                       Technician: [id number]  :        1970                   Requested By:ER TRIAGE PROTOCOL  Order #:    [id number]                    Reading MD: Becky Duggan    Measurements  Intervals                                Axis  Rate:       107                          P:          54  [place]:         144                          QRS:        40  QRSD:       90                           T:          8  QT:         340  QTc:        454    Interpretive Statements  SINUS TACHYCARDIA  PROBABLE LEFT ATRIAL ABNORMALITY  RSR' IN V1 OR V2, RIGHT VCD OR RVH  Compared to ECG 2022 12:41:32  Right ventricular hypertrophy now present  RSR' in V1 or V2 now present  Sinus rhythm no longer present  Electronically Signed On 8- 17:52:09 PDT by Becky Duggan       RADIOLOGY  DX-CHEST-PORTABLE (1 VIEW)   Final Result      1.  Atelectasis at both lung bases      2.  No other finding      CT-CTA CHEST PULMONARY ARTERY W/ RECONS    (Results Pending)   CT-ABDOMEN-PELVIS WITH    (Results Pending)     COURSE & MEDICAL DECISION MAKING  Pertinent Labs & Imaging studies reviewed. (See chart for details)    This is a 52-year-old female presenting to the emergency department for evaluation of shortness of breath and abdominal distention after surgery.  On initial evaluation, the patient did not appear to be in any acute distress but she was noted to be persistently tachycardic.       EKG was consistent with sinus tachycardia but did not show any evidence of acute ischemia.  Troponin was normal.  Chest x-ray was clear.    D-dimer was ordered and elevated at 2.  The patient has been persistently tachycardic and dropped her oxygen level to 88%.  She was placed on 1 L of nasal cannula.  Given her positive D-dimer, recent surgery, and symptoms, I am concerned for pulmonary embolism.  CT was ordered.    9:02 PM - The patient was signed out to Dr Hamlin pending results of the CT and final disposition.     FINAL IMPRESSION  1. Abdominal distension    2. Shortness of breath      Electronically signed by: Becky Duggan D.O., 8/15/2022 5:55 PM

## 2022-08-16 NOTE — ED NOTES
Patient's oxygen saturation 89-90% on room air after ambulation to and from BR. Placed on 2L NC humidified with saturation improving to 94%.

## 2022-08-16 NOTE — PROGRESS NOTES
"    DATE: 8/16/2022    Hospital Day 1  Gallbladder fluid collection .    INTERVAL EVENTS:  Awaiting IR drainage of fluid collection  Continues to have mild shortness of breath and now tolerating air  No other significant events    - Repeat CBC in AM  - IV hydration while NPO  - Advance diet as tolerated after IR procedure  - Continue to encourage independent ambulation    REVIEW OF SYSTEMS:  Review of Systems   Constitutional:  Positive for malaise/fatigue. Negative for chills, diaphoresis and fever.   Respiratory:  Positive for shortness of breath.    Cardiovascular:  Negative for chest pain.   Gastrointestinal:  Positive for abdominal pain (right sided). Negative for nausea and vomiting.   Genitourinary: Negative.    Musculoskeletal: Negative.    Neurological: Negative.      PHYSICAL EXAMINATION:  Vital Signs: /69   Pulse 89   Temp 36.5 °C (97.7 °F) (Temporal)   Resp 18   Ht 1.575 m (5' 2\")   Wt 82.9 kg (182 lb 12.2 oz)   SpO2 91%     Physical Exam  Constitutional:       General: She is not in acute distress.     Appearance: She is not toxic-appearing or diaphoretic.   Cardiovascular:      Rate and Rhythm: Normal rate and regular rhythm.   Pulmonary:      Effort: Pulmonary effort is normal. No respiratory distress.      Breath sounds: Normal breath sounds.   Abdominal:      General: Bowel sounds are normal. There is no distension.      Palpations: Abdomen is soft.      Tenderness: There is abdominal tenderness (RUQ & RLQ).      Comments: Scattered abdominal incisions well approximated with surrounding ecchymosis and no signs of infection.   Skin:     General: Skin is warm and dry.   Neurological:      Mental Status: She is alert and oriented to person, place, and time. Mental status is at baseline.   Psychiatric:         Mood and Affect: Mood normal.         Behavior: Behavior normal.       LABORATORY VALUES:   Recent Labs     08/15/22  1614   WBC 9.5   RBC 4.65   HEMOGLOBIN 13.4   HEMATOCRIT 40.2 "   MCV 86.5   MCH 28.8   MCHC 33.3*   RDW 41.2   PLATELETCT 278   MPV 10.4     Recent Labs     08/15/22  1614   SODIUM 139   POTASSIUM 3.5*   CHLORIDE 101   CO2 27   GLUCOSE 101*   BUN 8   CREATININE 0.48*   CALCIUM 9.2     Recent Labs     08/15/22  1614 08/16/22  1049   ASTSGOT 33  --    ALTSGPT 81*  --    TBILIRUBIN 0.5  --    ALKPHOSPHAT 86  --    GLOBULIN 3.2  --    INR  --  1.08     Recent Labs     08/16/22  1049   INR 1.08        IMAGING:   CT-CTA CHEST PULMONARY ARTERY W/ RECONS   Final Result         1.  No pulmonary embolus appreciated.   2.  Atherosclerosis and atherosclerotic coronary artery disease.      CT-ABDOMEN-PELVIS WITH   Final Result         1.  Large collection of intermediate density fluid in the gallbladder fossa with stippled air, could represent postop hematoma and/or evolving abscess.   2.  Hepatomegaly with diffuse low-attenuation changes of the liver, appearance most compatible with hepatic steatosis   3.  Posterior disc osteophyte complex at L5/S1 results in bilateral neural foraminal stenosis   4.  Mild fluid-filled prominence of small bowel suggests component of ileus and/or enteritis   5.  Minimal diverticulosis      DX-CHEST-PORTABLE (1 VIEW)   Final Result      1.  Atelectasis at both lung bases      2.  No other finding      CT-DRAIN-PERITONEAL    (Results Pending)       Radiology images reviewed, Labs reviewed and Medications reviewed  Smith catheter: No Smith      DVT Prophylaxis: Enoxaparin (Lovenox)  DVT prophylaxis - mechanical: SCDs        ASSESSMENT AND PLAN:   * Intraabdominal fluid collection- (present on admission)  Assessment & Plan  Status-post laparoscopic cholecystectomy 8/12  Worsening abdominal distention, pain in right-upper quadrant, and dyspnea 8/15  CT with 8.5 cm x 6.6 cm intermediate density fluid collection in gallbladder fossa  8/16 IR drainage PENDING.    No contraindication to deep vein thrombosis (DVT) prophylaxis- (present on admission)  Assessment &  Plan  Prophylactic dose enoxaparin initiated upon admission.    Obesity- (present on admission)  Assessment & Plan  BMI 33.43      Discussed patient condition with Family, Patient, and general surgery, Dr. Dewitt.

## 2022-08-16 NOTE — ASSESSMENT & PLAN NOTE
Status-post laparoscopic cholecystectomy 8/12  Worsening abdominal distention, pain in right-upper quadrant, and dyspnea 8/15  CT with 8.5 cm x 6.6 cm intermediate density fluid collection in gallbladder fossa  8/16 IR drainage and cultures obtained  -Prelim abdominal fluid cultures negative

## 2022-08-16 NOTE — PROGRESS NOTES
"Report received from ER RN. Pt brought to unit via w/c Assume care. Pt. AAOx4 pt is sitting up in bed text ing. States \"I'm parra right now\" Pt agreed to be alone and will call when ready for assessment Pt has call light within reach,  bed is in the lowest position. Pt has no other needs at this time.    "

## 2022-08-16 NOTE — PROGRESS NOTES
2 RN Skin Assessment Completed by , RN and , RN.     Head: WDL  Ears: WDL  Nose: WDL  Mouth: WDL  Neck: WDL  Breasts/Chest: WDL  Shoulder Blades: WDL  Spine: WDL  (R) Arm/Elbow/hand: WDL  (L) Arm/Elbow/hand: WDL  Abdomen:4 lap stabs Open to air, approximated, post op bruising.   Groin: WDL  Sacrum/Coccyx/Buttocks: blanching  (R) Leg: WDL  (L) Leg: WDL  (R) Heel/Foot/Toe: blanching  (L) Heel/Foot/Toe: blanching              Devices in place: BP Cuff and Pulse Ox     Interventions in place: Gray ear foams and Pillows     Possible skin injury found: No     Pictures uploaded into Epic: N/A  Wound Consult Placed: N/A

## 2022-08-16 NOTE — H&P
DATE:  8/15/2022     HISTORY OF PRESENT ILLNESS: The patient is a 52 year-old White woman who presents to the Emergency Department with a one- day history of progressive dyspnea and right-upper quadrant pain. She underwent laparoscopic cholecystectomy and liver biopsy on 8/12, she was discharged 8/13 and doing well at home until this morning. Over the course of the day she has noted worsening abdominal bloating, right-upper quadrant pain, and dyspnea causing her to seek evaluation in the [facility] ER. CT scan revealed a large intermediate density fluid collection in the gallbladder fossa and did not show a PE. She is passing flatus and tolerating a diet, although her appetite has decreased today. She has not yet passed stool post-operatively. She denies fevers, chills, nausea, vomiting, cough.    PAST MEDICAL HISTORY:  has a past medical history of Anxiety, Depression, Fibroid, GERD (gastroesophageal reflux disease), Hemorrhoids (10/1/2014), High cholesterol, Tachycardia, and Thyroid nodule.    PAST SURGICAL HISTORY:  has a past surgical history that includes salpingo-oophorectomy, unilateral; bartholin gland excision; kirby by laparoscopy (N/A, 8/12/2022); and liver biopsy (N/A, 8/12/2022).    ALLERGIES:   Allergies   Allergen Reactions    Codeine Itching     Pt states that she is sensative       CURRENT MEDICATIONS:    Home Medications    **Home medications have not yet been reviewed for this encounter**         FAMILY HISTORY: family history includes Cancer in her mother; Heart Disease in her paternal grandfather; Hyperlipidemia in her father and mother; Hypertension in her father and mother; Thyroid in her sister.    SOCIAL HISTORY:  reports that she quit smoking about 10 years ago. Her smoking use included cigarettes. She has a 1.25 pack-year smoking history. She has never used smokeless tobacco. She reports that she does not drink alcohol and does not use drugs.    REVIEW OF SYSTEMS: Review of systems is  remarkable for the following dysgeusia. The remainder of the comprehensive ROS is negative with the exception of the aforementioned HPI, PMH, and PSH bullets in accordance with CMS guideline.    PHYSICAL EXAMINATION:    Physical Exam  Vitals and nursing note reviewed.   Constitutional:       General: She is not in acute distress.     Appearance: She is obese.   HENT:      Head: Normocephalic and atraumatic.      Right Ear: External ear normal.      Left Ear: External ear normal.      Nose: Nose normal.      Comments: Nasal cannula in place     Mouth/Throat:      Mouth: Mucous membranes are dry.   Eyes:      General: No scleral icterus.     Pupils: Pupils are equal, round, and reactive to light.   Cardiovascular:      Rate and Rhythm: Regular rhythm. Tachycardia present.      Pulses: Normal pulses.   Pulmonary:      Effort: Pulmonary effort is normal. No respiratory distress.      Breath sounds: Decreased air movement present.   Abdominal:      Palpations: Abdomen is soft.      Tenderness: There is no guarding or rebound.      Comments: Well-approximated port-site incisions, clean/dry/intact. Resolving ecchymosis adjacent to subxiphoid and umbilical incisions.  Tender to palpation in the right-upper quadrant overlying the gallbladder fossa.   Musculoskeletal:         General: No tenderness or deformity.      Cervical back: Neck supple.   Skin:     General: Skin is warm and dry.      Capillary Refill: Capillary refill takes less than 2 seconds.      Coloration: Skin is not jaundiced.   Neurological:      General: No focal deficit present.      Mental Status: She is alert and oriented to person, place, and time.   Psychiatric:         Mood and Affect: Mood normal.         Behavior: Behavior normal.       LABORATORY VALUES:   Recent Labs     08/13/22  0929 08/15/22  1614   WBC 10.2 9.5   RBC 4.47 4.65   HEMOGLOBIN 13.1 13.4   HEMATOCRIT 39.0 40.2   MCV 87.2 86.5   MCH 29.3 28.8   MCHC 33.6 33.3*   RDW 42.6 41.2    PLATELETCT 273 278   MPV 10.6 10.4     Recent Labs     08/13/22  0929 08/15/22  1614   SODIUM 140 139   POTASSIUM 3.7 3.5*   CHLORIDE 104 101   CO2 27 27   GLUCOSE 133* 101*   BUN 7* 8   CREATININE 0.51 0.48*   CALCIUM 8.6 9.2     Recent Labs     08/13/22  0929 08/15/22  1614   ASTSGOT 95* 33   ALTSGPT 131* 81*   TBILIRUBIN 0.4 0.5   ALKPHOSPHAT 72 86   GLOBULIN 2.9 3.2            IMAGING:   CT-CTA CHEST PULMONARY ARTERY W/ RECONS   Final Result         1.  No pulmonary embolus appreciated.   2.  Atherosclerosis and atherosclerotic coronary artery disease.      CT-ABDOMEN-PELVIS WITH   Final Result         1.  Large collection of intermediate density fluid in the gallbladder fossa with stippled air, could represent postop hematoma and/or evolving abscess.   2.  Hepatomegaly with diffuse low-attenuation changes of the liver, appearance most compatible with hepatic steatosis   3.  Posterior disc osteophyte complex at L5/S1 results in bilateral neural foraminal stenosis   4.  Mild fluid-filled prominence of small bowel suggests component of ileus and/or enteritis   5.  Minimal diverticulosis      DX-CHEST-PORTABLE (1 VIEW)   Final Result      1.  Atelectasis at both lung bases      2.  No other finding          ASSESSMENT AND PLAN:     * Intraabdominal fluid collection- (present on admission)  Assessment & Plan  Status-post laparoscopic cholecystectomy 8/12  Worsening abdominal distention, pain in right-upper quadrant, and dyspnea 8/15  Saturating mid 90s on 1L nasal canula in ER  CT with 8.5 cm x 6.6 cm intermediate density fluid collection in gallbladder fossa    Given new supplemental oxygen requirement will admit for observation  Air in right quadrant collection of unclear significance, no signs or symptoms of infection  May benefit from percutaneous drainage of collection given symptoms         ____________________________________     Ramin Sampson M.D.    DD: 8/15/2022  10:49 PM    ACS NSQIP Surgical Risk  Calculator

## 2022-08-17 ENCOUNTER — APPOINTMENT (OUTPATIENT)
Dept: RADIOLOGY | Facility: MEDICAL CENTER | Age: 52
DRG: 921 | End: 2022-08-17
Attending: SURGERY
Payer: COMMERCIAL

## 2022-08-17 ENCOUNTER — APPOINTMENT (OUTPATIENT)
Dept: RADIOLOGY | Facility: MEDICAL CENTER | Age: 52
DRG: 921 | End: 2022-08-17
Payer: COMMERCIAL

## 2022-08-17 PROBLEM — K75.81 NASH (NONALCOHOLIC STEATOHEPATITIS): Status: ACTIVE | Noted: 2022-08-17

## 2022-08-17 LAB
ANION GAP SERPL CALC-SCNC: 12 MMOL/L (ref 7–16)
BASOPHILS # BLD AUTO: 0.5 % (ref 0–1.8)
BASOPHILS # BLD: 0.04 K/UL (ref 0–0.12)
BUN SERPL-MCNC: 8 MG/DL (ref 8–22)
CALCIUM SERPL-MCNC: 8.5 MG/DL (ref 8.5–10.5)
CHLORIDE SERPL-SCNC: 102 MMOL/L (ref 96–112)
CO2 SERPL-SCNC: 24 MMOL/L (ref 20–33)
CREAT SERPL-MCNC: 0.49 MG/DL (ref 0.5–1.4)
EOSINOPHIL # BLD AUTO: 0.36 K/UL (ref 0–0.51)
EOSINOPHIL NFR BLD: 4.1 % (ref 0–6.9)
ERYTHROCYTE [DISTWIDTH] IN BLOOD BY AUTOMATED COUNT: 41.2 FL (ref 35.9–50)
GFR SERPLBLD CREATININE-BSD FMLA CKD-EPI: 113 ML/MIN/1.73 M 2
GLUCOSE SERPL-MCNC: 90 MG/DL (ref 65–99)
GRAM STN SPEC: NORMAL
HCT VFR BLD AUTO: 37.1 % (ref 37–47)
HGB BLD-MCNC: 12.6 G/DL (ref 12–16)
IMM GRANULOCYTES # BLD AUTO: 0.03 K/UL (ref 0–0.11)
IMM GRANULOCYTES NFR BLD AUTO: 0.3 % (ref 0–0.9)
LYMPHOCYTES # BLD AUTO: 2.36 K/UL (ref 1–4.8)
LYMPHOCYTES NFR BLD: 26.6 % (ref 22–41)
MCH RBC QN AUTO: 29.4 PG (ref 27–33)
MCHC RBC AUTO-ENTMCNC: 34 G/DL (ref 33.6–35)
MCV RBC AUTO: 86.7 FL (ref 81.4–97.8)
MONOCYTES # BLD AUTO: 0.76 K/UL (ref 0–0.85)
MONOCYTES NFR BLD AUTO: 8.6 % (ref 0–13.4)
NEUTROPHILS # BLD AUTO: 5.33 K/UL (ref 2–7.15)
NEUTROPHILS NFR BLD: 59.9 % (ref 44–72)
NRBC # BLD AUTO: 0 K/UL
NRBC BLD-RTO: 0 /100 WBC
PLATELET # BLD AUTO: 289 K/UL (ref 164–446)
PMV BLD AUTO: 10.4 FL (ref 9–12.9)
POTASSIUM SERPL-SCNC: 3.4 MMOL/L (ref 3.6–5.5)
RBC # BLD AUTO: 4.28 M/UL (ref 4.2–5.4)
SIGNIFICANT IND 70042: NORMAL
SITE SITE: NORMAL
SODIUM SERPL-SCNC: 138 MMOL/L (ref 135–145)
SOURCE SOURCE: NORMAL
WBC # BLD AUTO: 8.9 K/UL (ref 4.8–10.8)

## 2022-08-17 PROCEDURE — A9270 NON-COVERED ITEM OR SERVICE: HCPCS

## 2022-08-17 PROCEDURE — 700102 HCHG RX REV CODE 250 W/ 637 OVERRIDE(OP): Performed by: SURGERY

## 2022-08-17 PROCEDURE — 700111 HCHG RX REV CODE 636 W/ 250 OVERRIDE (IP)

## 2022-08-17 PROCEDURE — 96372 THER/PROPH/DIAG INJ SC/IM: CPT

## 2022-08-17 PROCEDURE — A9270 NON-COVERED ITEM OR SERVICE: HCPCS | Performed by: SURGERY

## 2022-08-17 PROCEDURE — 700111 HCHG RX REV CODE 636 W/ 250 OVERRIDE (IP): Performed by: RADIOLOGY

## 2022-08-17 PROCEDURE — G0378 HOSPITAL OBSERVATION PER HR: HCPCS

## 2022-08-17 PROCEDURE — 94669 MECHANICAL CHEST WALL OSCILL: CPT

## 2022-08-17 PROCEDURE — 85025 COMPLETE CBC W/AUTO DIFF WBC: CPT

## 2022-08-17 PROCEDURE — 87205 SMEAR GRAM STAIN: CPT

## 2022-08-17 PROCEDURE — 99152 MOD SED SAME PHYS/QHP 5/>YRS: CPT

## 2022-08-17 PROCEDURE — 99024 POSTOP FOLLOW-UP VISIT: CPT

## 2022-08-17 PROCEDURE — 0W9G30Z DRAINAGE OF PERITONEAL CAVITY WITH DRAINAGE DEVICE, PERCUTANEOUS APPROACH: ICD-10-PCS | Performed by: RADIOLOGY

## 2022-08-17 PROCEDURE — 700105 HCHG RX REV CODE 258: Performed by: SURGERY

## 2022-08-17 PROCEDURE — 700111 HCHG RX REV CODE 636 W/ 250 OVERRIDE (IP): Performed by: SURGERY

## 2022-08-17 PROCEDURE — 80048 BASIC METABOLIC PNL TOTAL CA: CPT

## 2022-08-17 PROCEDURE — 82247 BILIRUBIN TOTAL: CPT

## 2022-08-17 PROCEDURE — 87070 CULTURE OTHR SPECIMN AEROBIC: CPT

## 2022-08-17 PROCEDURE — 700102 HCHG RX REV CODE 250 W/ 637 OVERRIDE(OP)

## 2022-08-17 RX ORDER — ATORVASTATIN CALCIUM 20 MG/1
20 TABLET, FILM COATED ORAL NIGHTLY
Status: DISCONTINUED | OUTPATIENT
Start: 2022-08-17 | End: 2022-08-19 | Stop reason: HOSPADM

## 2022-08-17 RX ORDER — SODIUM CHLORIDE 9 MG/ML
500 INJECTION, SOLUTION INTRAVENOUS
Status: ACTIVE | OUTPATIENT
Start: 2022-08-17 | End: 2022-08-17

## 2022-08-17 RX ORDER — ONDANSETRON 2 MG/ML
4 INJECTION INTRAMUSCULAR; INTRAVENOUS EVERY 8 HOURS PRN
Status: ACTIVE | OUTPATIENT
Start: 2022-08-17 | End: 2022-08-18

## 2022-08-17 RX ORDER — MIDAZOLAM HYDROCHLORIDE 1 MG/ML
INJECTION INTRAMUSCULAR; INTRAVENOUS
Status: COMPLETED
Start: 2022-08-17 | End: 2022-08-17

## 2022-08-17 RX ORDER — OXYCODONE HYDROCHLORIDE 10 MG/1
10 TABLET ORAL
Status: ACTIVE | OUTPATIENT
Start: 2022-08-17 | End: 2022-08-18

## 2022-08-17 RX ORDER — ONDANSETRON 2 MG/ML
4 INJECTION INTRAMUSCULAR; INTRAVENOUS PRN
Status: ACTIVE | OUTPATIENT
Start: 2022-08-17 | End: 2022-08-17

## 2022-08-17 RX ORDER — LIDOCAINE HYDROCHLORIDE 10 MG/ML
INJECTION, SOLUTION INFILTRATION; PERINEURAL
Status: DISPENSED
Start: 2022-08-17 | End: 2022-08-17

## 2022-08-17 RX ORDER — MIDAZOLAM HYDROCHLORIDE 1 MG/ML
.5-2 INJECTION INTRAMUSCULAR; INTRAVENOUS PRN
Status: ACTIVE | OUTPATIENT
Start: 2022-08-17 | End: 2022-08-17

## 2022-08-17 RX ORDER — OXYCODONE HYDROCHLORIDE 5 MG/1
5 TABLET ORAL
Status: ACTIVE | OUTPATIENT
Start: 2022-08-17 | End: 2022-08-18

## 2022-08-17 RX ORDER — POTASSIUM CHLORIDE 20 MEQ/1
40 TABLET, EXTENDED RELEASE ORAL ONCE
Status: ACTIVE | OUTPATIENT
Start: 2022-08-17 | End: 2022-08-18

## 2022-08-17 RX ADMIN — ALPRAZOLAM 0.25 MG: 0.25 TABLET ORAL at 20:10

## 2022-08-17 RX ADMIN — ACETAMINOPHEN 1000 MG: 500 TABLET ORAL at 12:47

## 2022-08-17 RX ADMIN — FENTANYL CITRATE 25 MCG: 50 INJECTION, SOLUTION INTRAMUSCULAR; INTRAVENOUS at 09:52

## 2022-08-17 RX ADMIN — FENTANYL CITRATE 25 MCG: 50 INJECTION, SOLUTION INTRAMUSCULAR; INTRAVENOUS at 09:57

## 2022-08-17 RX ADMIN — SODIUM CHLORIDE, POTASSIUM CHLORIDE, SODIUM LACTATE AND CALCIUM CHLORIDE: 600; 310; 30; 20 INJECTION, SOLUTION INTRAVENOUS at 18:10

## 2022-08-17 RX ADMIN — MIDAZOLAM 2 MG: 1 INJECTION INTRAMUSCULAR; INTRAVENOUS at 09:52

## 2022-08-17 RX ADMIN — ATORVASTATIN CALCIUM 20 MG: 20 TABLET, FILM COATED ORAL at 20:10

## 2022-08-17 RX ADMIN — MIDAZOLAM HYDROCHLORIDE 2 MG: 1 INJECTION, SOLUTION INTRAMUSCULAR; INTRAVENOUS at 09:52

## 2022-08-17 RX ADMIN — ACETAMINOPHEN 1000 MG: 500 TABLET ORAL at 23:05

## 2022-08-17 RX ADMIN — ACETAMINOPHEN 1000 MG: 500 TABLET ORAL at 18:10

## 2022-08-17 RX ADMIN — ENOXAPARIN SODIUM 40 MG: 40 INJECTION SUBCUTANEOUS at 18:10

## 2022-08-17 RX ADMIN — FENTANYL CITRATE 25 MCG: 50 INJECTION, SOLUTION INTRAMUSCULAR; INTRAVENOUS at 09:00

## 2022-08-17 RX ADMIN — MIDAZOLAM HYDROCHLORIDE 1 MG: 1 INJECTION, SOLUTION INTRAMUSCULAR; INTRAVENOUS at 09:57

## 2022-08-17 ASSESSMENT — ENCOUNTER SYMPTOMS
ABDOMINAL PAIN: 1
NAUSEA: 0
MUSCULOSKELETAL NEGATIVE: 1
FEVER: 0
SHORTNESS OF BREATH: 0
VOMITING: 0
CHILLS: 0
DIAPHORESIS: 0
NEUROLOGICAL NEGATIVE: 1

## 2022-08-17 ASSESSMENT — PAIN DESCRIPTION - PAIN TYPE
TYPE: ACUTE PAIN
TYPE: SURGICAL PAIN

## 2022-08-17 NOTE — CARE PLAN
Problem: Hyperinflation  Goal: Prevent or improve atelectasis  Description: Target End Date:  3 to 4 days    1. Instruct incentive spirometry usage  2.  Perform hyperinflation therapy as indicated  Flowsheets  Taken 8/16/2022 1713 by Yanick Banuelos, RRT  Hyperinflation Protocol Indications: Atelectasis Documented by Chest X-Ray  Taken 8/16/2022 1431 by Yamini Jones, RRT  Hyperinflation Protocol Goals/Outcome: Improvement in Repeat CXR  Hyperinflation Protocol Indications: Atelectasis Documented by Chest X-Ray   PEP QID 1000

## 2022-08-17 NOTE — CARE PLAN
Problem: Hyperinflation  Goal: Prevent or improve atelectasis  Description: Target End Date:  3 to 4 days    1. Instruct incentive spirometry usage  2.  Perform hyperinflation therapy as indicated  Outcome: Progressing  Flowsheets  Taken 8/16/2022 1713 by Yanick Banuelos, RRT  Hyperinflation Protocol Indications: Atelectasis Documented by Chest X-Ray  Taken 8/16/2022 1431 by Yamini Jones, RRT  Hyperinflation Protocol Goals/Outcome: Improvement in Repeat CXR  Hyperinflation Protocol Indications: Atelectasis Documented by Chest X-Ray

## 2022-08-17 NOTE — PROGRESS NOTES
Pt is experiencing multiple loose BM today, according to patient she's been having loose BM since prior to admission and received milk of mag/ miralax upon admission. Pt is requesting some Imodium at this time since she is currently incontinent of bowel.     Pt is also tearful and experiencing some anxiety due to situation/procedure tomorrow and would like to be medicated.     Reached out to on call MD Sr. Received orders for Xanax, pt will be medicated by MAR.

## 2022-08-17 NOTE — CARE PLAN
Problem: Knowledge Deficit - Standard  Goal: Patient and family/care givers will demonstrate understanding of plan of care, disease process/condition, diagnostic tests and medications  Outcome: Progressing   The patient is Stable - Low risk of patient condition declining or worsening    Shift Goals  Clinical Goals: ambulation and pain control by 1500  Patient Goals: rest    Progress made toward(s) clinical / shift goals:  yes pt was ambulating about hallways and no pain reported during shift    Patient is not progressing towards the following goals:

## 2022-08-17 NOTE — CARE PLAN
The patient is Watcher - Medium risk of patient condition declining or worsening    Shift Goals  Clinical Goals: IR in AM  Patient Goals: Imodium, anxiety medication, rest  Family Goals: n/a    Progress made toward(s) clinical / shift goals:    Problem: Knowledge Deficit - Standard  Goal: Patient and family/care givers will demonstrate understanding of plan of care, disease process/condition, diagnostic tests and medications  Outcome: Progressing       Patient is not progressing towards the following goals:

## 2022-08-17 NOTE — OR SURGEON
Immediate Post- Operative Note        Findings: RUQ Biloma      Procedure(s): CT Drainage      Estimated Blood Loss: Less than 5 ml        Complications: None            8/17/2022     10:33 AM     Kelvin Osborne M.D.

## 2022-08-17 NOTE — OR SURGEON
Immediate Post- Operative Note        Findings: Needs Chemo      Procedure(s): RIJ Albina Cath      Estimated Blood Loss: Less than 5 ml        Complications: None            8/17/2022     9:41 AM     Kelvin Osborne M.D.

## 2022-08-17 NOTE — PROGRESS NOTES
Report received from IR. Pt up to floor, Post Op vitals in place. no s/s of hematoma or bleeding, CMS intact. Adequate drainage from TEO drain; Monitor on pt.  Post op plan Pt verbalized understanding to post op plan VSS.

## 2022-08-17 NOTE — PROGRESS NOTES
Bedside report received Pt denies pain; no overnight events;Pending IR procedure; . POC discussed with pt; all questions answered at this time.

## 2022-08-17 NOTE — PROGRESS NOTES
Patient to IR CT-4 via transport for abdominal abscess drain placement with moderate sedation.  Patient identified per hospital standards and placed in holding for pre procedure interview/consent.  Patient chart/allergies/labs/meds/history all reviewed.  Dr. Osborne to bedside for informed consent.  Procedure has been explained to patient and all questions have been answered.  Signed and witnessed consent form obtained and on the chart.  Patient moved to procedure table in supine fashion and prepped per sterile standard.  All safety/positioning/monitoring equipment present and in use.  Please see intraprocedure narrator/Epic flowsheets for complete case details.    Successful completion of procedure as outlined above.  Patient tolerated procedure and sedation well and without incident.  New 10F drain to RUQ dressed with stay fix gauze and medipore tape.  Report called to primary RN.  Patient escorted back to her room in [facility], on room air with no complaints of pain.  16ml specimen has been sent for testing.  SDJ    [employer] Locking Pigtail  10f x 25cm  REF [id number]  LOT [id number]  EXP 05-10-25

## 2022-08-17 NOTE — PROGRESS NOTES
"    DATE: 8/17/2022    Hospital Day 1  Gallbladder fluid collection .    INTERVAL EVENTS:  WBC remains normalized, currently afebrile & nontoxic  Awaiting IR drain placement today  K+ trend down to 3.4    - Replete potassium  - Repeat CBC in AM  - Advance diet as tolerated after IR procedure  - Continue to encourage independent ambulation    REVIEW OF SYSTEMS:  Review of Systems   Constitutional:  Negative for chills, diaphoresis, fever and malaise/fatigue.   Respiratory:  Negative for shortness of breath.         Mild Dyspnea   Cardiovascular:  Negative for chest pain.   Gastrointestinal:  Positive for abdominal pain (right sided). Negative for nausea and vomiting.   Genitourinary: Negative.    Musculoskeletal: Negative.    Neurological: Negative.      PHYSICAL EXAMINATION:  Vital Signs: /80   Pulse 88   Temp 37.1 °C (98.7 °F) (Temporal)   Resp 18   Ht 1.575 m (5' 2.01\")   Wt 82.9 kg (182 lb 12.2 oz)   SpO2 95%     Physical Exam  Constitutional:       General: She is not in acute distress.     Appearance: She is not toxic-appearing or diaphoretic.   Cardiovascular:      Rate and Rhythm: Normal rate and regular rhythm.   Pulmonary:      Effort: Pulmonary effort is normal. No respiratory distress.      Breath sounds: Normal breath sounds.   Abdominal:      General: Bowel sounds are normal. There is no distension.      Palpations: Abdomen is soft.      Tenderness: There is abdominal tenderness (RUQ & RLQ).      Comments: Scattered abdominal incisions well approximated with surrounding ecchymosis and no signs of infection.   Skin:     General: Skin is warm and dry.   Neurological:      Mental Status: She is alert and oriented to person, place, and time. Mental status is at baseline.   Psychiatric:         Mood and Affect: Mood normal.         Behavior: Behavior normal.       LABORATORY VALUES:   Recent Labs     08/15/22  1614 08/16/22  1537 08/17/22  0241   WBC 9.5 9.4 8.9   RBC 4.65 4.39 4.28   HEMOGLOBIN " 13.4 13.0 12.6   HEMATOCRIT 40.2 38.1 37.1   MCV 86.5 86.8 86.7   MCH 28.8 29.6 29.4   MCHC 33.3* 34.1 34.0   RDW 41.2 41.7 41.2   PLATELETCT 278 259 289   MPV 10.4 10.0 10.4       Recent Labs     08/15/22  1614 08/16/22  1537 08/17/22  0241   SODIUM 139 140 138   POTASSIUM 3.5* 3.8 3.4*   CHLORIDE 101 102 102   CO2 27 27 24   GLUCOSE 101* 77 90   BUN 8 9 8   CREATININE 0.48* 0.44* 0.49*   CALCIUM 9.2 8.8 8.5       Recent Labs     08/15/22  1614 08/16/22  1049   ASTSGOT 33  --    ALTSGPT 81*  --    TBILIRUBIN 0.5  --    ALKPHOSPHAT 86  --    GLOBULIN 3.2  --    INR  --  1.08       Recent Labs     08/16/22  1049   INR 1.08          IMAGING:   CT-CTA CHEST PULMONARY ARTERY W/ RECONS   Final Result         1.  No pulmonary embolus appreciated.   2.  Atherosclerosis and atherosclerotic coronary artery disease.      CT-ABDOMEN-PELVIS WITH   Final Result         1.  Large collection of intermediate density fluid in the gallbladder fossa with stippled air, could represent postop hematoma and/or evolving abscess.   2.  Hepatomegaly with diffuse low-attenuation changes of the liver, appearance most compatible with hepatic steatosis   3.  Posterior disc osteophyte complex at L5/S1 results in bilateral neural foraminal stenosis   4.  Mild fluid-filled prominence of small bowel suggests component of ileus and/or enteritis   5.  Minimal diverticulosis      DX-CHEST-PORTABLE (1 VIEW)   Final Result      1.  Atelectasis at both lung bases      2.  No other finding      CT-DRAIN-PERITONEAL    (Results Pending)       Radiology images reviewed, Labs reviewed and Medications reviewed  Smith catheter: No Smith      DVT Prophylaxis: Enoxaparin (Lovenox)  DVT prophylaxis - mechanical: SCDs        ASSESSMENT AND PLAN:   * Intraabdominal fluid collection- (present on admission)  Assessment & Plan  Status-post laparoscopic cholecystectomy 8/12  Worsening abdominal distention, pain in right-upper quadrant, and dyspnea 8/15  CT with 8.5 cm x  6.6 cm intermediate density fluid collection in gallbladder fossa  8/16 IR drainage PENDING.    No contraindication to deep vein thrombosis (DVT) prophylaxis- (present on admission)  Assessment & Plan  Prophylactic dose enoxaparin initiated upon admission.    Obesity- (present on admission)  Assessment & Plan  BMI 33.43      Discussed patient condition with RN, Patient, and general surgery, Dr. Dewitt.

## 2022-08-18 PROBLEM — R18.8 ABDOMINAL FLUID COLLECTION: Status: ACTIVE | Noted: 2022-08-18

## 2022-08-18 LAB
ANION GAP SERPL CALC-SCNC: 10 MMOL/L (ref 7–16)
BASOPHILS # BLD AUTO: 0.4 % (ref 0–1.8)
BASOPHILS # BLD: 0.03 K/UL (ref 0–0.12)
BILIRUB FLD-MCNC: 0.7 MG/DL
BUN SERPL-MCNC: 8 MG/DL (ref 8–22)
CALCIUM SERPL-MCNC: 8.6 MG/DL (ref 8.5–10.5)
CHLORIDE SERPL-SCNC: 104 MMOL/L (ref 96–112)
CO2 SERPL-SCNC: 25 MMOL/L (ref 20–33)
CREAT SERPL-MCNC: 0.57 MG/DL (ref 0.5–1.4)
EOSINOPHIL # BLD AUTO: 0.33 K/UL (ref 0–0.51)
EOSINOPHIL NFR BLD: 4.1 % (ref 0–6.9)
ERYTHROCYTE [DISTWIDTH] IN BLOOD BY AUTOMATED COUNT: 41.4 FL (ref 35.9–50)
GFR SERPLBLD CREATININE-BSD FMLA CKD-EPI: 109 ML/MIN/1.73 M 2
GLUCOSE SERPL-MCNC: 102 MG/DL (ref 65–99)
HCT VFR BLD AUTO: 35.8 % (ref 37–47)
HGB BLD-MCNC: 12.1 G/DL (ref 12–16)
IMM GRANULOCYTES # BLD AUTO: 0.04 K/UL (ref 0–0.11)
IMM GRANULOCYTES NFR BLD AUTO: 0.5 % (ref 0–0.9)
LYMPHOCYTES # BLD AUTO: 3.13 K/UL (ref 1–4.8)
LYMPHOCYTES NFR BLD: 38.5 % (ref 22–41)
MAGNESIUM SERPL-MCNC: 2 MG/DL (ref 1.5–2.5)
MCH RBC QN AUTO: 29.4 PG (ref 27–33)
MCHC RBC AUTO-ENTMCNC: 33.8 G/DL (ref 33.6–35)
MCV RBC AUTO: 87.1 FL (ref 81.4–97.8)
MONOCYTES # BLD AUTO: 0.74 K/UL (ref 0–0.85)
MONOCYTES NFR BLD AUTO: 9.1 % (ref 0–13.4)
NEUTROPHILS # BLD AUTO: 3.86 K/UL (ref 2–7.15)
NEUTROPHILS NFR BLD: 47.4 % (ref 44–72)
NRBC # BLD AUTO: 0 K/UL
NRBC BLD-RTO: 0 /100 WBC
PHOSPHATE SERPL-MCNC: 3.6 MG/DL (ref 2.5–4.5)
PLATELET # BLD AUTO: 289 K/UL (ref 164–446)
PMV BLD AUTO: 10.2 FL (ref 9–12.9)
POTASSIUM SERPL-SCNC: 3.6 MMOL/L (ref 3.6–5.5)
RBC # BLD AUTO: 4.11 M/UL (ref 4.2–5.4)
SODIUM SERPL-SCNC: 139 MMOL/L (ref 135–145)
SPECIMEN SOURCE: NORMAL
WBC # BLD AUTO: 8.1 K/UL (ref 4.8–10.8)

## 2022-08-18 PROCEDURE — 700105 HCHG RX REV CODE 258: Performed by: SURGERY

## 2022-08-18 PROCEDURE — 770001 HCHG ROOM/CARE - MED/SURG/GYN PRIV*

## 2022-08-18 PROCEDURE — 700102 HCHG RX REV CODE 250 W/ 637 OVERRIDE(OP)

## 2022-08-18 PROCEDURE — 85025 COMPLETE CBC W/AUTO DIFF WBC: CPT

## 2022-08-18 PROCEDURE — A9270 NON-COVERED ITEM OR SERVICE: HCPCS | Performed by: SURGERY

## 2022-08-18 PROCEDURE — 84100 ASSAY OF PHOSPHORUS: CPT

## 2022-08-18 PROCEDURE — 700111 HCHG RX REV CODE 636 W/ 250 OVERRIDE (IP): Performed by: SURGERY

## 2022-08-18 PROCEDURE — A9270 NON-COVERED ITEM OR SERVICE: HCPCS

## 2022-08-18 PROCEDURE — 94669 MECHANICAL CHEST WALL OSCILL: CPT

## 2022-08-18 PROCEDURE — 99024 POSTOP FOLLOW-UP VISIT: CPT

## 2022-08-18 PROCEDURE — 80048 BASIC METABOLIC PNL TOTAL CA: CPT

## 2022-08-18 PROCEDURE — 700102 HCHG RX REV CODE 250 W/ 637 OVERRIDE(OP): Performed by: SURGERY

## 2022-08-18 PROCEDURE — 83735 ASSAY OF MAGNESIUM: CPT

## 2022-08-18 RX ADMIN — ACETAMINOPHEN 1000 MG: 500 TABLET ORAL at 18:25

## 2022-08-18 RX ADMIN — ATORVASTATIN CALCIUM 20 MG: 20 TABLET, FILM COATED ORAL at 21:16

## 2022-08-18 RX ADMIN — ACETAMINOPHEN 1000 MG: 500 TABLET ORAL at 04:48

## 2022-08-18 RX ADMIN — SODIUM CHLORIDE, POTASSIUM CHLORIDE, SODIUM LACTATE AND CALCIUM CHLORIDE: 600; 310; 30; 20 INJECTION, SOLUTION INTRAVENOUS at 07:38

## 2022-08-18 RX ADMIN — ENOXAPARIN SODIUM 40 MG: 40 INJECTION SUBCUTANEOUS at 18:25

## 2022-08-18 ASSESSMENT — LIFESTYLE VARIABLES
HOW MANY TIMES IN THE PAST YEAR HAVE YOU HAD 5 OR MORE DRINKS IN A DAY: 0
EVER FELT BAD OR GUILTY ABOUT YOUR DRINKING: NO
HAVE PEOPLE ANNOYED YOU BY CRITICIZING YOUR DRINKING: NO
CONSUMPTION TOTAL: NEGATIVE
ON A TYPICAL DAY WHEN YOU DRINK ALCOHOL HOW MANY DRINKS DO YOU HAVE: 0
TOTAL SCORE: 0
AVERAGE NUMBER OF DAYS PER WEEK YOU HAVE A DRINK CONTAINING ALCOHOL: 0
TOTAL SCORE: 0
HAVE YOU EVER FELT YOU SHOULD CUT DOWN ON YOUR DRINKING: NO
EVER HAD A DRINK FIRST THING IN THE MORNING TO STEADY YOUR NERVES TO GET RID OF A HANGOVER: NO
ALCOHOL_USE: NO
TOTAL SCORE: 0

## 2022-08-18 ASSESSMENT — ENCOUNTER SYMPTOMS
WEAKNESS: 0
HEMOPTYSIS: 0
BLURRED VISION: 0
SHORTNESS OF BREATH: 0
NEUROLOGICAL NEGATIVE: 1
MYALGIAS: 0
VOMITING: 0
PALPITATIONS: 0
ABDOMINAL PAIN: 0
COUGH: 0
DIZZINESS: 0
MUSCULOSKELETAL NEGATIVE: 1
HEADACHES: 0
CHILLS: 0
BRUISES/BLEEDS EASILY: 0
ABDOMINAL PAIN: 1
DIAPHORESIS: 0
FEVER: 0
NAUSEA: 0

## 2022-08-18 NOTE — PROGRESS NOTES
"    DATE: 8/18/2022    Hospital Day 2  Gallbladder fluid collection .    INTERVAL EVENTS:  IR drain placement yesterday and cultures obtained.  Output is thin, serous, brown, and red tinged, likely consistent with old blood.  Drain output 90 mL since placement.  WBC remains normalized, currently afebrile & nontoxic.  Prelim abdominal fluid cultures negative.    - Continue TEO drain  - Trend fluid cultures until finalized  - Repeat CBC in AM  - Continue to encourage independent ambulation    REVIEW OF SYSTEMS:  Review of Systems   Constitutional:  Negative for chills, diaphoresis, fever and malaise/fatigue.   Respiratory:  Negative for shortness of breath.         Mild Dyspnea on inspiration   Cardiovascular:  Negative for chest pain.   Gastrointestinal:  Negative for abdominal pain, nausea and vomiting.   Genitourinary: Negative.    Musculoskeletal: Negative.    Neurological: Negative.      PHYSICAL EXAMINATION:  Vital Signs: /73   Pulse 78   Temp 37.1 °C (98.7 °F) (Temporal)   Resp 18   Ht 1.575 m (5' 2.01\")   Wt 82.9 kg (182 lb 12.2 oz)   SpO2 95%     Physical Exam  Constitutional:       General: She is not in acute distress.     Appearance: She is not toxic-appearing or diaphoretic.   Cardiovascular:      Rate and Rhythm: Normal rate and regular rhythm.   Pulmonary:      Effort: Pulmonary effort is normal. No respiratory distress.      Breath sounds: Normal breath sounds.   Abdominal:      General: Bowel sounds are normal. There is no distension.      Palpations: Abdomen is soft.      Tenderness: There is abdominal tenderness (RUQ).      Comments: Scattered abdominal incisions well approximated with surrounding ecchymosis and no signs of infection.   Skin:     General: Skin is warm and dry.   Neurological:      Mental Status: She is alert and oriented to person, place, and time. Mental status is at baseline.   Psychiatric:         Mood and Affect: Mood normal.         Behavior: Behavior normal. "       LABORATORY VALUES:   Recent Labs     08/16/22  1537 08/17/22  0241 08/18/22  0045   WBC 9.4 8.9 8.1   RBC 4.39 4.28 4.11*   HEMOGLOBIN 13.0 12.6 12.1   HEMATOCRIT 38.1 37.1 35.8*   MCV 86.8 86.7 87.1   MCH 29.6 29.4 29.4   MCHC 34.1 34.0 33.8   RDW 41.7 41.2 41.4   PLATELETCT 259 289 289   MPV 10.0 10.4 10.2     Recent Labs     08/16/22  1537 08/17/22  0241 08/18/22  0045   SODIUM 140 138 139   POTASSIUM 3.8 3.4* 3.6   CHLORIDE 102 102 104   CO2 27 24 25   GLUCOSE 77 90 102*   BUN 9 8 8   CREATININE 0.44* 0.49* 0.57   CALCIUM 8.8 8.5 8.6     Recent Labs     08/15/22  1614 08/16/22  1049   ASTSGOT 33  --    ALTSGPT 81*  --    TBILIRUBIN 0.5  --    ALKPHOSPHAT 86  --    GLOBULIN 3.2  --    INR  --  1.08     Recent Labs     08/16/22  1049   INR 1.08        IMAGING:   CT-DRAIN-PERITONEAL   Final Result      1.  CT guided gallbladder fossa biloma catheter drainage.   2.  The current plan is to obtain a follow-up CT in 5-7 days..      CT-CTA CHEST PULMONARY ARTERY W/ RECONS   Final Result         1.  No pulmonary embolus appreciated.   2.  Atherosclerosis and atherosclerotic coronary artery disease.      CT-ABDOMEN-PELVIS WITH   Final Result         1.  Large collection of intermediate density fluid in the gallbladder fossa with stippled air, could represent postop hematoma and/or evolving abscess.   2.  Hepatomegaly with diffuse low-attenuation changes of the liver, appearance most compatible with hepatic steatosis   3.  Posterior disc osteophyte complex at L5/S1 results in bilateral neural foraminal stenosis   4.  Mild fluid-filled prominence of small bowel suggests component of ileus and/or enteritis   5.  Minimal diverticulosis      DX-CHEST-PORTABLE (1 VIEW)   Final Result      1.  Atelectasis at both lung bases      2.  No other finding          Radiology images reviewed, Labs reviewed and Medications reviewed  Smith catheter: No Smith      DVT Prophylaxis: Enoxaparin (Lovenox)  DVT prophylaxis - mechanical:  SCDs        ASSESSMENT AND PLAN:   * Intraabdominal fluid collection- (present on admission)  Assessment & Plan  Status-post laparoscopic cholecystectomy 8/12  Worsening abdominal distention, pain in right-upper quadrant, and dyspnea 8/15  CT with 8.5 cm x 6.6 cm intermediate density fluid collection in gallbladder fossa  8/16 IR drainage and cultures obtained  -Prelim abdominal fluid cultures negative    No contraindication to deep vein thrombosis (DVT) prophylaxis- (present on admission)  Assessment & Plan  Prophylactic dose enoxaparin initiated upon admission.    Obesity- (present on admission)  Assessment & Plan  BMI 33.43    Hyperlipidemia- (present on admission)  Assessment & Plan  Chronic condition treated with Atorvastatin.  Resumed maintenance medication on admission.    Discussed patient condition with Patient and general surgery, Dr. Dewitt.

## 2022-08-18 NOTE — PROGRESS NOTES
Radiology Progress Note   Author: PRITESH Hooker Date & Time created: 8/18/2022  4:20 PM   Date of admission  8/15/2022  Note to reader: this note follows the APSO format rather than the historical SOAP format. Assessment and plan located at the top of the note for ease of use.    Chief Complaint  52 y.o. female admitted 8/15/2022 with   Chief Complaint   Patient presents with    Shortness of Breath     Pt states she can't take a deep breath. Pt also states things taste different and would like a COVID test.     Abdominal Pain     RUQ pain    Post-Op Complications     Pt had a lap kirby on Saturday. Pt states she has been getting progressively more distended since procedure. Pt states she had a bm on Friday and that was the only day. Pt has been taking dulcolax.          HPI  52 year-old White woman who presents to the Emergency Department with a one- day history of progressive dyspnea and right-upper quadrant pain. She underwent laparoscopic cholecystectomy and liver biopsy on 8/12, she was discharged 8/13 and doing well at home until this morning. Over the course of the day she has noted worsening abdominal bloating, right-upper quadrant pain, and dyspnea causing her to seek evaluation in the [facility] ER. CT scan revealed a large intermediate density fluid collection in the gallbladder fossa and did not show a PE. She is passing flatus and tolerating a diet, although her appetite has decreased today. She has not yet passed stool post-operatively. She denies fevers, chills, nausea, vomiting, cough.     Consulted for GB fossa drain placement    Assessment/Plan  Interval History   Principal Problem:    Intraabdominal fluid collection  Active Problems:    Hyperlipidemia    Obesity    No contraindication to deep vein thrombosis (DVT) prophylaxis    Abdominal fluid collection      Plan IR  - Irrigate RUG drain with 10 ml of sterile saline twice per shift   - Fluid cultures pending   - Surgery following and  managing drain, IR will sign off   - Recommend follow up CT scan in 5-7 days (8/23)  or as outpatient, IR can arrange for outpatient imaging and drain removal if needed, please contact myself or IR team       -Thank you for allowing Interventional Radiology team to participate in the patients care, if any additional care or requests are needed in the future please do not hesitate call or place IR order   [phone]      IR:   8/17- GB fossa drain placed   8/18- Patient CO of slight soreness at drain site but overall feeling better. Old blood drainage in TEO Bulb. Pending fluid cultures          Review of Systems  Physical Exam   Review of Systems   Constitutional:  Negative for chills and fever.   HENT:  Negative for hearing loss.    Eyes:  Negative for blurred vision.   Respiratory:  Negative for cough, hemoptysis and shortness of breath.    Cardiovascular:  Negative for chest pain and palpitations.   Gastrointestinal:  Positive for abdominal pain. Negative for vomiting.        Soreness at drain site    Genitourinary:  Negative for dysuria.   Musculoskeletal:  Negative for myalgias.   Skin:  Negative for rash.   Neurological:  Negative for dizziness, weakness and headaches.   Endo/Heme/Allergies:  Does not bruise/bleed easily.    Vitals:    08/18/22 1506   BP:    Pulse: 81   Resp: 18   Temp:    SpO2: 97%        Physical Exam  Constitutional:       Appearance: Normal appearance.   HENT:      Head: Normocephalic.      Nose: Nose normal.      Mouth/Throat:      Mouth: Mucous membranes are moist.   Eyes:      Pupils: Pupils are equal, round, and reactive to light.   Cardiovascular:      Rate and Rhythm: Normal rate.   Pulmonary:      Effort: Pulmonary effort is normal. No respiratory distress.   Abdominal:      Palpations: Abdomen is soft.      Tenderness: There is no abdominal tenderness.       Musculoskeletal:         General: No tenderness or deformity.      Cervical back: Normal range of motion.   Skin:     General:  Skin is warm and dry.      Coloration: Skin is not jaundiced or pale.   Neurological:      Mental Status: She is alert and oriented to person, place, and time.      Motor: No weakness.   Psychiatric:         Mood and Affect: Mood normal.         Behavior: Behavior normal.           Labs    Recent Labs     08/16/22 1537 08/17/22 0241 08/18/22  0045   WBC 9.4 8.9 8.1   RBC 4.39 4.28 4.11*   HEMOGLOBIN 13.0 12.6 12.1   HEMATOCRIT 38.1 37.1 35.8*   MCV 86.8 86.7 87.1   MCH 29.6 29.4 29.4   MCHC 34.1 34.0 33.8   RDW 41.7 41.2 41.4   PLATELETCT 259 289 289   MPV 10.0 10.4 10.2     Recent Labs     08/16/22 1537 08/17/22  0241 08/18/22  0045   SODIUM 140 138 139   POTASSIUM 3.8 3.4* 3.6   CHLORIDE 102 102 104   CO2 27 24 25   GLUCOSE 77 90 102*   BUN 9 8 8   CREATININE 0.44* 0.49* 0.57   CALCIUM 8.8 8.5 8.6     Recent Labs     08/16/22 1537 08/17/22  0241 08/18/22  0045   CREATININE 0.44* 0.49* 0.57     CT-DRAIN-PERITONEAL   Final Result      1.  CT guided gallbladder fossa biloma catheter drainage.   2.  The current plan is to obtain a follow-up CT in 5-7 days..      CT-CTA CHEST PULMONARY ARTERY W/ RECONS   Final Result         1.  No pulmonary embolus appreciated.   2.  Atherosclerosis and atherosclerotic coronary artery disease.      CT-ABDOMEN-PELVIS WITH   Final Result         1.  Large collection of intermediate density fluid in the gallbladder fossa with stippled air, could represent postop hematoma and/or evolving abscess.   2.  Hepatomegaly with diffuse low-attenuation changes of the liver, appearance most compatible with hepatic steatosis   3.  Posterior disc osteophyte complex at L5/S1 results in bilateral neural foraminal stenosis   4.  Mild fluid-filled prominence of small bowel suggests component of ileus and/or enteritis   5.  Minimal diverticulosis      DX-CHEST-PORTABLE (1 VIEW)   Final Result      1.  Atelectasis at both lung bases      2.  No other finding          INR   Date Value Ref Range Status    08/16/2022 1.08 0.87 - 1.13 Final     Comment:     INR - Non-therapeutic Reference Range: 0.87-1.13  INR - Therapeutic Reference Range: 2.0-4.0       No results found for: POCINR     Intake/Output Summary (Last 24 hours) at 8/18/2022 1620  Last data filed at 8/18/2022 0400  Gross per 24 hour   Intake --   Output 0.5 ml   Net -0.5 ml      Labs not explicitly included in this progress note were reviewed by the author. Radiology/imaging not explicitly included in this progress note was reviewed by the author.     I have performed a physical exam and reviewed and updated ROS and Plan today (8/18/2022).     15 minutes in directly providing and coordinating care and extensive data review.  No time overlap and excludes procedures.

## 2022-08-18 NOTE — PROGRESS NOTES
Pt verbalized she still feels some discomfort when taking deep breath. Maintaining oxygen. Pt verbalizes concern that fluid might still be collecting. TEO drain functioning properly. Encourage patient to write down questions she may have for MD that RN could not provide.

## 2022-08-18 NOTE — CARE PLAN
The patient is Watcher - Medium risk of patient condition declining or worsening    Shift Goals  Clinical Goals: IR procedure recovery, pain control  Patient Goals: pain control, rest  Family Goals: n/a    Progress made toward(s) clinical / shift goals:    Problem: Knowledge Deficit - Standard  Goal: Patient and family/care givers will demonstrate understanding of plan of care, disease process/condition, diagnostic tests and medications  Outcome: Progressing     Problem: Pain - Standard  Goal: Alleviation of pain or a reduction in pain to the patient’s comfort goal  Outcome: Progressing       Patient is not progressing towards the following goals:

## 2022-08-19 VITALS
TEMPERATURE: 97.8 F | RESPIRATION RATE: 16 BRPM | BODY MASS INDEX: 33.63 KG/M2 | HEART RATE: 76 BPM | HEIGHT: 62 IN | SYSTOLIC BLOOD PRESSURE: 127 MMHG | WEIGHT: 182.76 LBS | DIASTOLIC BLOOD PRESSURE: 80 MMHG | OXYGEN SATURATION: 93 %

## 2022-08-19 DIAGNOSIS — R18.8 INTRAABDOMINAL FLUID COLLECTION: ICD-10-CM

## 2022-08-19 LAB
ANION GAP SERPL CALC-SCNC: 13 MMOL/L (ref 7–16)
BASOPHILS # BLD AUTO: 0.4 % (ref 0–1.8)
BASOPHILS # BLD: 0.03 K/UL (ref 0–0.12)
BUN SERPL-MCNC: 8 MG/DL (ref 8–22)
CALCIUM SERPL-MCNC: 9 MG/DL (ref 8.5–10.5)
CHLORIDE SERPL-SCNC: 105 MMOL/L (ref 96–112)
CO2 SERPL-SCNC: 22 MMOL/L (ref 20–33)
CREAT SERPL-MCNC: 0.56 MG/DL (ref 0.5–1.4)
EOSINOPHIL # BLD AUTO: 0.35 K/UL (ref 0–0.51)
EOSINOPHIL NFR BLD: 4.5 % (ref 0–6.9)
ERYTHROCYTE [DISTWIDTH] IN BLOOD BY AUTOMATED COUNT: 40.8 FL (ref 35.9–50)
GFR SERPLBLD CREATININE-BSD FMLA CKD-EPI: 109 ML/MIN/1.73 M 2
GLUCOSE BLD STRIP.AUTO-MCNC: 97 MG/DL (ref 65–99)
GLUCOSE SERPL-MCNC: 100 MG/DL (ref 65–99)
HCT VFR BLD AUTO: 38.3 % (ref 37–47)
HGB BLD-MCNC: 12.8 G/DL (ref 12–16)
IMM GRANULOCYTES # BLD AUTO: 0.02 K/UL (ref 0–0.11)
IMM GRANULOCYTES NFR BLD AUTO: 0.3 % (ref 0–0.9)
LYMPHOCYTES # BLD AUTO: 3.29 K/UL (ref 1–4.8)
LYMPHOCYTES NFR BLD: 42.2 % (ref 22–41)
MCH RBC QN AUTO: 29 PG (ref 27–33)
MCHC RBC AUTO-ENTMCNC: 33.4 G/DL (ref 33.6–35)
MCV RBC AUTO: 86.7 FL (ref 81.4–97.8)
MONOCYTES # BLD AUTO: 0.6 K/UL (ref 0–0.85)
MONOCYTES NFR BLD AUTO: 7.7 % (ref 0–13.4)
NEUTROPHILS # BLD AUTO: 3.5 K/UL (ref 2–7.15)
NEUTROPHILS NFR BLD: 44.9 % (ref 44–72)
NRBC # BLD AUTO: 0 K/UL
NRBC BLD-RTO: 0 /100 WBC
PLATELET # BLD AUTO: 325 K/UL (ref 164–446)
PMV BLD AUTO: 10.1 FL (ref 9–12.9)
POTASSIUM SERPL-SCNC: 3.3 MMOL/L (ref 3.6–5.5)
RBC # BLD AUTO: 4.42 M/UL (ref 4.2–5.4)
SODIUM SERPL-SCNC: 140 MMOL/L (ref 135–145)
WBC # BLD AUTO: 7.8 K/UL (ref 4.8–10.8)

## 2022-08-19 PROCEDURE — 82962 GLUCOSE BLOOD TEST: CPT

## 2022-08-19 PROCEDURE — 700102 HCHG RX REV CODE 250 W/ 637 OVERRIDE(OP): Performed by: SURGERY

## 2022-08-19 PROCEDURE — 85025 COMPLETE CBC W/AUTO DIFF WBC: CPT

## 2022-08-19 PROCEDURE — 80048 BASIC METABOLIC PNL TOTAL CA: CPT

## 2022-08-19 PROCEDURE — 99024 POSTOP FOLLOW-UP VISIT: CPT

## 2022-08-19 PROCEDURE — A9270 NON-COVERED ITEM OR SERVICE: HCPCS

## 2022-08-19 PROCEDURE — A9270 NON-COVERED ITEM OR SERVICE: HCPCS | Performed by: SURGERY

## 2022-08-19 PROCEDURE — 700102 HCHG RX REV CODE 250 W/ 637 OVERRIDE(OP)

## 2022-08-19 RX ORDER — POTASSIUM CHLORIDE 20 MEQ/1
40 TABLET, EXTENDED RELEASE ORAL ONCE
Status: COMPLETED | OUTPATIENT
Start: 2022-08-19 | End: 2022-08-19

## 2022-08-19 RX ORDER — TRAMADOL HYDROCHLORIDE 50 MG/1
50 TABLET ORAL EVERY 6 HOURS PRN
Status: DISCONTINUED | OUTPATIENT
Start: 2022-08-19 | End: 2022-08-19 | Stop reason: HOSPADM

## 2022-08-19 RX ADMIN — ACETAMINOPHEN 1000 MG: 500 TABLET ORAL at 06:28

## 2022-08-19 RX ADMIN — POTASSIUM CHLORIDE 40 MEQ: 1500 TABLET, EXTENDED RELEASE ORAL at 09:14

## 2022-08-19 ASSESSMENT — ENCOUNTER SYMPTOMS
MUSCULOSKELETAL NEGATIVE: 1
FEVER: 0
CHILLS: 0
VOMITING: 0
NEUROLOGICAL NEGATIVE: 1
ABDOMINAL PAIN: 0
DIAPHORESIS: 0
NAUSEA: 0
SHORTNESS OF BREATH: 0

## 2022-08-19 ASSESSMENT — PAIN DESCRIPTION - PAIN TYPE: TYPE: ACUTE PAIN

## 2022-08-19 NOTE — DISCHARGE SUMMARY
Trauma Discharge Summary    DATE OF ADMISSION: 8/15/2022    DATE OF DISCHARGE: 8/19/2022    LENGTH OF STAY: 4 days    ATTENDING PHYSICIAN: Ramin Sampson M.D.    CONSULTING PHYSICIAN:   Ceci FERNANDEZ, Radiology    DISCHARGE DIAGNOSIS:  Principal Problem:    Intraabdominal fluid collection POA: Yes  Active Problems:    Abdominal fluid collection POA: Yes    Hyperlipidemia POA: Yes      Overview: The 10-year ASCVD risk score (Leroy DC Jr., et al., 2013) is: 1.8%          Obesity POA: Yes    No contraindication to deep vein thrombosis (DVT) prophylaxis POA: Yes  Resolved Problems:    * No resolved hospital problems. *      PROCEDURES:  CT Drainage on 8/17/22    HISTORY OF PRESENT ILLNESS: The patient is a 52 y.o. female who presented to the emergency department with right upper quadrant pain. She recently underwent a laparoscopic cholecystectomy on 8/12/22 with liver biopsy. Liver biopsy results were consistent with fatty liver. General surgery was consulted for further workup and management.    HOSPITAL COURSE: A CT scan was obtained and showed a gallbladder fluid collection. Interventional radiology was consulted for CT guided drainage. Cultures were obtained and negative for bacteria. The patient was transported to clinical observation unit. Drain output was thin, brown, and dark red tinged. The patient's hospital course was uncomplicated and she was discharge home with a TEO drain and close outpatient interventional radiology clinic follow up.    On day of discharge, the patient was tolerating room air and a regular diet. She had no pain. She was able to ambulate independently. A referral was placed for outpatient gastroenterology. She was discharged home with outpatient follow up as discussed below.    HOSPITAL PROBLEM LIST:  * Intraabdominal fluid collection- (present on admission)  Assessment & Plan  Status-post laparoscopic cholecystectomy 8/12  Worsening abdominal distention, pain in right-upper  quadrant, and dyspnea 8/15  CT with 8.5 cm x 6.6 cm intermediate density fluid collection in gallbladder fossa  8/16 IR drainage and cultures obtained  -Prelim abdominal fluid cultures negative    No contraindication to deep vein thrombosis (DVT) prophylaxis- (present on admission)  Assessment & Plan  Prophylactic dose enoxaparin initiated upon admission.    Obesity- (present on admission)  Assessment & Plan  BMI 33.43    Hyperlipidemia- (present on admission)  Assessment & Plan  Chronic condition treated with Atorvastatin.  Resumed maintenance medication on admission.      DISPOSITION: Discharged home on 8/19/22. The patient was counseled and questions were answered. Specifically, signs and symptoms of infection, respiratory decompensation, purulent drain output, green or yellow drain output, and persistent or worsening pain were discussed and the patient agrees to seek medical attention if any of these develop.    DISCHARGE MEDICATIONS:  The patients controlled substance history was reviewed and a controlled substance use informed consent (if applicable) was provided by [facility] and the patient has been prescribed.       Medication List        CONTINUE taking these medications        Instructions   acetaminophen 325 MG Tabs  Commonly known as: Tylenol   Take 2 Tablets by mouth every 6 hours as needed for Mild Pain.  Dose: 650 mg     atorvastatin 20 MG Tabs  Commonly known as: LIPITOR   Take 1 Tablet by mouth every evening.  Dose: 20 mg     fexofenadine 180 MG tablet  Commonly known as: ALLEGRA   Take 180 mg by mouth 1 time a day as needed (Allergies).  Dose: 180 mg            STOP taking these medications      oxyCODONE immediate-release 5 MG Tabs  Commonly known as: ROXICODONE              ACTIVITY: as tolerated    WOUND CARE: N/A    DIET:  Orders Placed This Encounter   Procedures    Diet Order Diet: Low Fiber(GI Soft)     Standing Status:   Standing     Number of Occurrences:   1     Order  Specific Question:   Diet:     Answer:   Low Fiber(GI Soft) [2]     FOLLOW UP:  Edelmira Saucedo M.D.  [address] [id number] [phone]    Schedule an appointment as soon as possible for a visit in 1 week(s)  Follow up with your primary care provider as soon as possible to discuss your recent hospitalization.    PRITESH Hooker  [facility] [id number]  [facility] [id number] [phone]    Schedule an appointment as soon as possible for a visit  Follow up with radiology clinic for drain removal.      TIME SPENT ON DISCHARGE: 39 minutes      ____________________________________________  PRITESH Sandoval    DD: 8/19/2022 3:15 PM

## 2022-08-19 NOTE — PROGRESS NOTES
"    DATE: 8/19/2022    Hospital Day 3  Gallbladder fluid collection .    INTERVAL EVENTS:  Prelim abdominal fluid cultures remain negative.  IR drain output remains thin, serous, brown, and red tinged.  Drain output 30 mL over last 24 hours.  WBC remains normalized, currently afebrile & nontoxic.    REVIEW OF SYSTEMS:  Review of Systems   Constitutional:  Negative for chills, diaphoresis, fever and malaise/fatigue.   Respiratory:  Negative for shortness of breath.    Cardiovascular:  Negative for chest pain.   Gastrointestinal:  Negative for abdominal pain, nausea and vomiting.   Genitourinary: Negative.    Musculoskeletal: Negative.    Neurological: Negative.      PHYSICAL EXAMINATION:  Vital Signs: /77   Pulse 75   Temp 36.2 °C (97.1 °F) (Temporal)   Resp 16   Ht 1.575 m (5' 2.01\")   Wt 82.9 kg (182 lb 12.2 oz)   SpO2 93%     Physical Exam  Vitals reviewed.   Constitutional:       General: She is not in acute distress.     Appearance: Normal appearance. She is not ill-appearing, toxic-appearing or diaphoretic.   Cardiovascular:      Rate and Rhythm: Normal rate and regular rhythm.   Pulmonary:      Effort: Pulmonary effort is normal. No respiratory distress.      Breath sounds: Normal breath sounds.   Abdominal:      General: Bowel sounds are normal. There is no distension.      Palpations: Abdomen is soft.      Tenderness: There is no abdominal tenderness.      Comments: Scattered abdominal incisions well approximated with surrounding ecchymosis and no signs of infection.   Skin:     General: Skin is warm and dry.   Neurological:      Mental Status: She is alert and oriented to person, place, and time. Mental status is at baseline.   Psychiatric:         Mood and Affect: Mood normal.         Behavior: Behavior normal.       LABORATORY VALUES:   Recent Labs     08/17/22  0241 08/18/22  0045 08/19/22  0112   WBC 8.9 8.1 7.8   RBC 4.28 4.11* 4.42   HEMOGLOBIN 12.6 12.1 12.8   HEMATOCRIT 37.1 35.8* 38.3 "   MCV 86.7 87.1 86.7   MCH 29.4 29.4 29.0   MCHC 34.0 33.8 33.4*   RDW 41.2 41.4 40.8   PLATELETCT 289 289 325   MPV 10.4 10.2 10.1       Recent Labs     08/17/22  0241 08/18/22  0045 08/19/22  0112   SODIUM 138 139 140   POTASSIUM 3.4* 3.6 3.3*   CHLORIDE 102 104 105   CO2 24 25 22   GLUCOSE 90 102* 100*   BUN 8 8 8   CREATININE 0.49* 0.57 0.56   CALCIUM 8.5 8.6 9.0       Recent Labs     08/16/22  1049   INR 1.08       Recent Labs     08/16/22  1049   INR 1.08          IMAGING:   CT-DRAIN-PERITONEAL   Final Result      1.  CT guided gallbladder fossa biloma catheter drainage.   2.  The current plan is to obtain a follow-up CT in 5-7 days..      CT-CTA CHEST PULMONARY ARTERY W/ RECONS   Final Result         1.  No pulmonary embolus appreciated.   2.  Atherosclerosis and atherosclerotic coronary artery disease.      CT-ABDOMEN-PELVIS WITH   Final Result         1.  Large collection of intermediate density fluid in the gallbladder fossa with stippled air, could represent postop hematoma and/or evolving abscess.   2.  Hepatomegaly with diffuse low-attenuation changes of the liver, appearance most compatible with hepatic steatosis   3.  Posterior disc osteophyte complex at L5/S1 results in bilateral neural foraminal stenosis   4.  Mild fluid-filled prominence of small bowel suggests component of ileus and/or enteritis   5.  Minimal diverticulosis      DX-CHEST-PORTABLE (1 VIEW)   Final Result      1.  Atelectasis at both lung bases      2.  No other finding          Radiology images reviewed, Labs reviewed and Medications reviewed  Smith catheter: No Smith      DVT Prophylaxis: Enoxaparin (Lovenox)  DVT prophylaxis - mechanical: SCDs  Ulcer prophylaxis: Not indicated    Assessed for rehab: Patient returned to prior level of function, rehabilitation not indicated at this time    ASSESSMENT AND PLAN:   * Intraabdominal fluid collection- (present on admission)  Assessment & Plan  Status-post laparoscopic cholecystectomy  8/12  Worsening abdominal distention, pain in right-upper quadrant, and dyspnea 8/15  CT with 8.5 cm x 6.6 cm intermediate density fluid collection in gallbladder fossa  8/16 IR drainage and cultures obtained  -Prelim abdominal fluid cultures negative    No contraindication to deep vein thrombosis (DVT) prophylaxis- (present on admission)  Assessment & Plan  Prophylactic dose enoxaparin initiated upon admission.    Obesity- (present on admission)  Assessment & Plan  BMI 33.43    Hyperlipidemia- (present on admission)  Assessment & Plan  Chronic condition treated with Atorvastatin.  Resumed maintenance medication on admission.      Discussed patient condition with RN, Patient, and general surgery, Dr. Encarnacion.

## 2022-08-19 NOTE — PROGRESS NOTES
-  RN to educated patient how to flush RUQ drains with sterile saline once per day with 10 ml of sterile saline   - RN to educate how to change drain site dressing, Tegaderm and gauze    - Educate patient to keep daily log of fluid output    - Please provided enough sterile flushes for patient to flush once each day for 2 weeks   - Provided dressing changes for 2 weeks, needs to change PRN and every 2-3 days    -Outpatient CT in 2 weeks and IR abscess o gram and drain removal to follow, our office to call and schedule

## 2022-08-19 NOTE — CARE PLAN
The patient is Stable - Low risk of patient condition declining or worsening    Shift Goals  Clinical Goals: monitor TEO output, labs  Patient Goals: DC  Family Goals: not present    Progress made toward(s) clinical / shift goals:    Problem: Knowledge Deficit - Standard  Goal: Patient and family/care givers will demonstrate understanding of plan of care, disease process/condition, diagnostic tests and medications  Outcome: Progressing     Problem: Pain - Standard  Goal: Alleviation of pain or a reduction in pain to the patient’s comfort goal  Outcome: Progressing       Patient is not progressing towards the following goals:

## 2022-08-19 NOTE — PROGRESS NOTES
Report received from TANYA Murray.  Assumed care of patient.  Assessment complete.  All concerns addressed.  Patient sitting up in bed. .  Patient A & O x 4.  No apparent signs of distress.  Safety precautions in place.  Patient educated to call for assistance. Hourly rounding in place.

## 2022-08-19 NOTE — DISCHARGE INSTRUCTIONS
Post Operative Discharge Instructions:    1. DIET: Upon discharge from the hospital you may resume your normal preoperative diet. Depending on how you are feeling and whether you have nausea or not, you may wish to stay with a bland diet for the first few days. However, you can advance this as quickly as you feel ready.    2. ACTIVITIES: After discharge from the hospital, you may resume full routine activities. However, there should be no heavy lifting (greater than 10 pounds) and no strenuous activities until after your follow-up visit. Otherwise, routine activities of daily living are acceptable.    3. DRIVING: You may drive whenever you are off pain medications and are able to perform the activities needed to drive, i.e. turning, bending, twisting, etc.    4. BATHING: You may get the wound wet at any time after leaving the hospital. You may shower, but do not submerge in a bath for at least two weeks. If you have wound dressings, they may come off after 48 hours. If you have skin glue to the wound, this will fall off on its own, do not pick at it. If you have steri strips to the wound, these will fall off on their own, do not pick at them, may trim the edges if needed.    5. BOWEL FUNCTION: A few patients, after this operation, will develop either frequent or loose stools after meals. This usually corrects itself after a few days, to a few weeks. If this occurs, do not worry; it is not unusual and will resolve. Much more common than loose stools, is constipation. The combination of pain medication and decreased activity level can cause constipation in otherwise normal patients. If you feel this is occurring, take a laxative (Milk of Magnesia, Ex-Lax, Senokot, etc.) until the problem has resolved.    6. PAIN MEDICATION: You will be given a prescription for pain medication at discharge. Please take these as directed. It is important to remember not to take medications on an empty stomach as this may cause  nausea. You may also take over the counter acetaminophen and/or NSAIDS (ibuprofen, Aleve, Advil, Motrin) per the package instructions.     7.CALL IF YOU HAVE: (1) Fevers to more than 101F, (2) Unusual chest or leg pain, (3) Drainage or fluid from incision that may be foul smelling, increased tenderness or soreness at the wound or the wound edges are no longer together, redness or swelling at the incision site. Please do not hesitate to call with any other questions.    Surgical Drain Home Care  Surgical drains are used to remove extra fluid that normally builds up in a surgical wound after surgery. A surgical drain helps to heal a surgical wound. Different kinds of surgical drains include:  Active drains. These drains use suction to pull drainage away from the surgical wound. Drainage flows through a tube to a container outside of the body. With these drains, you need to keep the bulb or the drainage container flat (compressed) at all times, except while you empty it. Flattening the bulb or container creates suction.  Passive drains. These drains allow fluid to drain naturally, by gravity. Drainage flows through a tube to a bandage (dressing) or a container outside of the body. Passive drains do not need to be emptied.  A drain is placed during surgery. Right after surgery, drainage is usually bright red and a little thicker than water. The drainage may gradually turn yellow or pink and become thinner. It is likely that your health care provider will remove the drain when the drainage stops or when the amount decreases to 1-2 Tbsp (15-30 mL) during a 24-hour period.  Supplies needed:  Tape.  Germ-free cleaning solution (sterile saline).  Cotton swabs.  Split gauze drain sponge: 4 x 4 inches (10 x 10 cm).  Gauze square: 4 x 4 inches (10 x 10 cm).  How to care for your surgical drain  Care for your drain as told by your health care provider. This is important to help prevent infection. If your drain is placed at your  back, or any other hard-to-reach area, ask another person to assist you in performing the following tasks:  General care  Keep the skin around the drain dry and covered with a dressing at all times.  Check your drain area every day for signs of infection. Check for:  Redness, swelling, or pain.  Pus or a bad smell.  Cloudy drainage.  Tenderness or pressure at the drain exit site.  Changing the dressing  Follow instructions from your health care provider about how to change your dressing. Change your dressing at least once a day. Change it more often if needed to keep the dressing dry. Make sure you:  Gather your supplies.  Wash your hands with soap and water before you change your dressing. If soap and water are not available, use hand .  Remove the old dressing. Avoid using scissors to do that.  Wash your hands with soap and water again after removing the old dressing.  Use sterile saline to clean your skin around the drain. You may need to use a cotton swab to clean the skin.  Place the tube through the slit in a drain sponge. Place the drain sponge so that it covers your wound.  Place the gauze square or another drain sponge on top of the drain sponge that is on the wound. Make sure the tube is between those layers.  Tape the dressing to your skin.  Tape the drainage tube to your skin 1-2 inches (2.5-5 cm) below the place where the tube enters your body. Taping keeps the tube from pulling on any stitches (sutures) that you have.  Wash your hands with soap and water.  Write down the color of your drainage and how often you change your dressing.  How to empty your active drain    Make sure that you have a measuring cup that you can empty your drainage into.  Wash your hands with soap and water. If soap and water are not available, use hand .  Loosen any pins or clips that hold the tube in place.  If your health care provider tells you to strip the tube to prevent clots and tube blockages:  Hold  the tube at the skin with one hand. Use your other hand to pinch the tubing with your thumb and first finger.  Gently move your fingers down the tube while squeezing very lightly. This clears any drainage, clots, or tissue from the tube.  You may need to do this several times each day to keep the tube clear. Do not pull on the tube.  Open the bulb cap or the drain plug. Do not touch the inside of the cap or the bottom of the plug.  Turn the device upside down and gently squeeze.  Empty all of the drainage into the measuring cup.  Compress the bulb or the container and replace the cap or the plug. To compress the bulb or the container, squeeze it firmly in the middle while you close the cap or plug the container.  Write down the amount of drainage that you have in each 24-hour period. If you have less than 2 Tbsp (30 mL) of drainage during 24 hours, contact your health care provider.  Flush the drainage down the toilet.  Wash your hands with soap and water.  Contact a health care provider if:  You have redness, swelling, or pain around your drain area.  You have pus or a bad smell coming from your drain area.  You have a fever or chills.  The skin around your drain is warm to the touch.  The amount of drainage that you have is increasing instead of decreasing.  You have drainage that is cloudy.  There is a sudden stop or a sudden decrease in the amount of drainage that you have.  Your drain tube falls out.  Your active drain does not stay compressed after you empty it.  Summary  Surgical drains are used to remove extra fluid that normally builds up in a surgical wound after surgery.  Different kinds of surgical drains include active drains and passive drains. Active drains use suction to pull drainage away from the surgical wound, and passive drains allow fluid to drain naturally.  It is important to care for your drain to prevent infection. If your drain is placed at your back, or any other hard-to-reach area, ask  another person to assist you.  Contact your health care provider if you have redness, swelling, or pain around your drain area.  This information is not intended to replace advice given to you by your health care provider. Make sure you discuss any questions you have with your health care provider.  Document Released: 12/15/2001 Document Revised: 01/22/2020 Document Reviewed: 01/22/2020  [employer] Patient Education © 2020 [employer]    Discharge Instructions    Discharged to home by car with relative. Discharged via wheelchair, hospital escort: Yes.  Special equipment needed: Not Applicable    Be sure to schedule a follow-up appointment with your primary care doctor or any specialists as instructed.     Discharge Plan:   Diet Plan: Discussed  Activity Level: Discussed  Confirmed Follow up Appointment: Appointment Scheduled  Confirmed Symptoms Management: Discussed  Medication Reconciliation Updated: Yes    I understand that a diet low in cholesterol, fat, and sodium is recommended for good health. Unless I have been given specific instructions below for another diet, I accept this instruction as my diet prescription.   Other diet: heart healthy     Special Instructions: None    -Is this patient being discharged with medication to prevent blood clots?  No    Is patient discharged on Warfarin / Coumadin?   No

## 2022-08-20 ENCOUNTER — HOSPITAL ENCOUNTER (EMERGENCY)
Facility: MEDICAL CENTER | Age: 52
End: 2022-08-20
Attending: EMERGENCY MEDICINE
Payer: COMMERCIAL

## 2022-08-20 ENCOUNTER — APPOINTMENT (OUTPATIENT)
Dept: URGENT CARE | Facility: PHYSICIAN GROUP | Age: 52
End: 2022-08-20
Payer: COMMERCIAL

## 2022-08-20 VITALS
HEIGHT: 62 IN | DIASTOLIC BLOOD PRESSURE: 69 MMHG | SYSTOLIC BLOOD PRESSURE: 129 MMHG | WEIGHT: 179.9 LBS | OXYGEN SATURATION: 97 % | RESPIRATION RATE: 16 BRPM | BODY MASS INDEX: 33.1 KG/M2 | TEMPERATURE: 98.2 F | HEART RATE: 84 BPM

## 2022-08-20 DIAGNOSIS — Z48.01 DRESSING CHANGE OR REMOVAL, SURGICAL WOUND: ICD-10-CM

## 2022-08-20 LAB
BACTERIA WND AEROBE CULT: NORMAL
GRAM STN SPEC: NORMAL
SIGNIFICANT IND 70042: NORMAL
SITE SITE: NORMAL
SOURCE SOURCE: NORMAL

## 2022-08-20 PROCEDURE — 99282 EMERGENCY DEPT VISIT SF MDM: CPT

## 2022-08-20 ASSESSMENT — FIBROSIS 4 INDEX: FIB4 SCORE: 0.59

## 2022-08-20 NOTE — DOCUMENTATION QUERY
[facility]                                                                       Query Response Note      PATIENT:               ALVIN MAY  ACCT #:                  [id number]  MRN:                     [id number]  :                      1970  ADMIT DATE:       8/15/2022 5:14 PM  DISCH DATE:        2022 5:59 PM  RESPONDING  PROVIDER #:        [id number]           QUERY TEXT:    Based on your medical judgment and the clinical presentation, can the intra-abdominal fluid collection be further specified?        The patient's clinical indicators include:  H/P  - 51 y/o female s/p laparoscopic cholecystectomy presented with a large intermediate density fluid collection in the gallbladder fossa   - intraabdominal fluid collection    8/15 CT Abd/Pelvis  - Large collection of intermediate density fluid in the gallbladder fossa with stippled air, could represent postop hematoma and/or evolving abscess.  - Mild fluid-filled prominence of small bowel suggests component of ileus and/or enteritis      RAD  - RUQ Biloma      RAD  - GB fossa drain placed  Options provided:   -- The intra-abdominal fluid collection is likely a postoperative seroma.   -- The intra-abdominal fluid collection is likely an evolving abscess.   -- The intra-abdominal fluid collection is likely a postoperative hematoma and (is/is not) a complication of the recent laparoscopic cholecystectomy.   -- Other: please specify diagnosis   -- Unable to clinically determine      Query created by: Libia Abreu on 2022 12:32 PM    RESPONSE TEXT:    The intra-abdominal fluid collection is likely a postoperative hematoma and (is/is not) a complication of the recent laparoscopic cholecystectomy.          Electronically signed by:  SHAREE QUIÑONEZ MD 2022 8:00 PM

## 2022-08-20 NOTE — PROGRESS NOTES
Educated pt on flushing TEO drain and site care. Pt demo back. Pt aware of need to keep log of record output

## 2022-08-20 NOTE — ED TRIAGE NOTES
Chief Complaint   Patient presents with    Dressing Change     Recent gallbladder removal. Has a TEO drain in place. Is nervous to pull the drain out for the dressing change so is requesting help placing a new dressing.   Requesting more education on dressing changes. Doesn't know how often to do it or how.      No distress noted. VS stable.

## 2022-08-20 NOTE — PROGRESS NOTES
Discharging patient home. Verbalized understanding of discharge instructions, follow up appointments, and home care. All questions answered.  Belongings with patient at time of discharge.  Vitals signs WNL. Pt given discharge information. Discharge assessment completed.

## 2022-08-20 NOTE — ED PROVIDER NOTES
ED Provider Note    CHIEF COMPLAINT  Chief Complaint   Patient presents with    Dressing Change     Recent gallbladder removal. Has a TEO drain in place. Is nervous to pull the drain out for the dressing change so is requesting help placing a new dressing.   Requesting more education on dressing changes. Doesn't know how often to do it or how.         HPI  Sury Vidal is a 52 y.o. female who presents to the ED because she is concerned about a drain that has been placed.  The patient had a gallbladder surgery removal 1 week ago.  Then apparently she had a some accumulation of fluid and then did require TEO drain to be placed.  Patient was discharged on Wednesday she was to do a dressing change and she was concerned that she may have accidentally pulled part of the drain out and then she would like to have us take a look at it.  Patient denies a fever chills nausea vomiting she does describe a slight rash is going on around the wound site but denies any other symptoms.    REVIEW OF SYSTEMS  See HPI for further details. All other systems are negative.     PAST MEDICAL HISTORY  Past Medical History:   Diagnosis Date    Anxiety     Depression     Fibroid 08/01/2019    estimated    GERD (gastroesophageal reflux disease)     Hemorrhoids 10/01/2014    High cholesterol     Tachycardia     Thyroid nodule        FAMILY HISTORY  Family History   Problem Relation Age of Onset    Hyperlipidemia Mother     Hypertension Mother     Cancer Mother         thyroid    Hyperlipidemia Father     Hypertension Father     Thyroid Sister     Heart Disease Paternal Grandfather         MI    Diabetes Neg Hx      Patient's family history has been discussed and is been found to be noncontributory to his present illness  SOCIAL HISTORY  Social History     Socioeconomic History    Marital status: Single   Tobacco Use    Smoking status: Former     Packs/day: 0.25     Years: 5.00     Pack years: 1.25     Types: Cigarettes     Quit date:  "11/27/2011     Years since quitting: 10.7    Smokeless tobacco: Never   Vaping Use    Vaping Use: Never used   Substance and Sexual Activity    Alcohol use: No     Comment: few drinks a year    Drug use: No    Sexual activity: Yes     Partners: Male      Edelmira Saucedo M.D.        SURGICAL HISTORY  Past Surgical History:   Procedure Laterality Date    TAMEKA BY LAPAROSCOPY N/A 8/12/2022    Procedure: CHOLECYSTECTOMY, LAPAROSCOPIC;  Surgeon: Junaid Calzada M.D.;  Location: SURGERY [facility];  Service: General    LIVER BIOPSY N/A 8/12/2022    Procedure: BIOPSY, LIVER;  Surgeon: Junaid Calzada M.D.;  Location: SURGERY [facility];  Service: General    BARTHOLIN GLAND EXCISION      SALPINGO-OOPHORECTOMY, UNILATERAL      dermoid cyst, age 19       CURRENT MEDICATIONS  Home Medications       Reviewed by Prakash Pedro R.N. (Registered Nurse) on 08/20/22 at 1306  Med List Status: Partial     Medication Last Dose Status   acetaminophen (TYLENOL) 325 MG Tab  Active   atorvastatin (LIPITOR) 20 MG Tab  Active   fexofenadine (ALLEGRA) 180 MG tablet  Active                    ALLERGIES  Allergies   Allergen Reactions    Codeine Itching     Pt states that she is sensative       PHYSICAL EXAM  VITAL SIGNS: /78   Pulse 91   Temp 36.8 °C (98.3 °F) (Temporal)   Resp 16   Ht 1.575 m (5' 2\")   Wt 81.6 kg (179 lb 14.3 oz)   LMP 04/20/2017   SpO2 96%   BMI 32.90 kg/m²    Pulse Oximetry was obtained. It showed a reading of Pulse Oximetry: 96 %.  I interpreted this as soft nontender.  .     Constitutional: Well developed, Well nourished, No acute distress, Non-toxic appearance.   Abdomen: The patient does have a TEO drain in place.  I did remove most of the dressing she does have an attachment dressing that appears well the skin appears well immediately around underneath her breast she does have a slight amount of irritation or rash but no signs of cellulitis or other abnormalities.  Skin: Warm, Dry, No " erythema,           RADIOLOGY/PROCEDURES  None    COURSE & MEDICAL DECISION MAKING  Pertinent Labs & Imaging studies reviewed. (See chart for details)  The drain itself appears well.  We did replace some of the drain dressings and shown the patient how to do this.  I did answer a lot of her questions about the drain itself.  She is to follow-up with her surgeon for further outpatient treatment and care and follow-up which is next week.  She should return as needed.    FINAL IMPRESSION  1. Dressing change or removal, surgical wound              Electronically signed by: Mono Soto M.D., 8/20/2022 2:57 PM

## 2022-08-20 NOTE — ED NOTES
Pt's existing wound dressing was dry and did not have any discharge on it. ERP cut the dressing off to check the area closer to the incision which is also clean and dry.

## 2022-08-24 ENCOUNTER — OFFICE VISIT (OUTPATIENT)
Dept: MEDICAL GROUP | Facility: MEDICAL CENTER | Age: 52
End: 2022-08-24
Attending: INTERNAL MEDICINE
Payer: COMMERCIAL

## 2022-08-24 VITALS
OXYGEN SATURATION: 99 % | BODY MASS INDEX: 32.76 KG/M2 | RESPIRATION RATE: 16 BRPM | HEART RATE: 90 BPM | DIASTOLIC BLOOD PRESSURE: 68 MMHG | TEMPERATURE: 98.1 F | HEIGHT: 62 IN | WEIGHT: 178 LBS | SYSTOLIC BLOOD PRESSURE: 104 MMHG

## 2022-08-24 DIAGNOSIS — Z90.49 S/P CHOLECYSTECTOMY: ICD-10-CM

## 2022-08-24 DIAGNOSIS — E66.9 CLASS 1 OBESITY WITH BODY MASS INDEX (BMI) OF 30.0 TO 30.9 IN ADULT, UNSPECIFIED OBESITY TYPE, UNSPECIFIED WHETHER SERIOUS COMORBIDITY PRESENT: ICD-10-CM

## 2022-08-24 DIAGNOSIS — E78.5 HYPERLIPIDEMIA, UNSPECIFIED HYPERLIPIDEMIA TYPE: ICD-10-CM

## 2022-08-24 DIAGNOSIS — R73.03 PREDIABETES: ICD-10-CM

## 2022-08-24 DIAGNOSIS — K75.81 NASH (NONALCOHOLIC STEATOHEPATITIS): ICD-10-CM

## 2022-08-24 DIAGNOSIS — R18.8 ABDOMINAL FLUID COLLECTION: ICD-10-CM

## 2022-08-24 PROBLEM — Z78.9 NO CONTRAINDICATION TO DEEP VEIN THROMBOSIS (DVT) PROPHYLAXIS: Status: RESOLVED | Noted: 2022-08-16 | Resolved: 2022-08-24

## 2022-08-24 PROBLEM — K76.0 HEPATIC STEATOSIS: Status: RESOLVED | Noted: 2022-05-31 | Resolved: 2022-08-24

## 2022-08-24 PROBLEM — K80.20 SYMPTOMATIC CHOLELITHIASIS: Status: RESOLVED | Noted: 2022-05-31 | Resolved: 2022-08-24

## 2022-08-24 PROBLEM — R10.10 UPPER ABDOMINAL PAIN: Status: RESOLVED | Noted: 2021-10-08 | Resolved: 2022-08-24

## 2022-08-24 PROCEDURE — 99212 OFFICE O/P EST SF 10 MIN: CPT | Performed by: INTERNAL MEDICINE

## 2022-08-24 PROCEDURE — 99214 OFFICE O/P EST MOD 30 MIN: CPT | Performed by: INTERNAL MEDICINE

## 2022-08-24 ASSESSMENT — FIBROSIS 4 INDEX: FIB4 SCORE: 0.59

## 2022-08-24 NOTE — ASSESSMENT & PLAN NOTE
Has a drain in place, output has been low over the past few days and steadily decreasing, was 6 ml yesterday.  Fluid is serosanguenous.  Has an appt with her surgeon on Friday to discuss removal.

## 2022-08-24 NOTE — ASSESSMENT & PLAN NOTE
atherosclerosis seen on CTA chest in coronary arteries.  She just restarted her atorvastatin.  Last labs showed LDL of 190 (off medication).

## 2022-08-24 NOTE — PROGRESS NOTES
"Subjective:   Sury Vidal is a 52 y.o. female here today for hospital follow up    Abdominal fluid collection  Has a drain in place, output has been low over the past few days and steadily decreasing, was 6 ml yesterday.  Fluid is serosanguenous.  Has an appt with her surgeon on Friday to discuss removal.    MILLER (nonalcoholic steatohepatitis), fibrosis stage 1a  Noted on liver biopsy done cholecystectomy.  Per patient, surgeon noticed her liver was \"yellow\" and thought it was about 80% fat.  Biopsy revealed stage 1a fibrosis.  She would like to work with a dietician to develop an eating plan for this issue as well as prediabetes, weight loss, and post cholecystectomy diet.    Hyperlipidemia  atherosclerosis seen on CTA chest in coronary arteries.  She just restarted her atorvastatin.  Last labs showed LDL of 190 (off medication).    S/P cholecystectomy  Recently underwent cholecystectomy for acute on chronic cholecystitis.  Post op course complicated by an abdominal fluid collection.  Surgeon is Dr. Frye.         Current medicines (including changes today)  Current Outpatient Medications   Medication Sig Dispense Refill    acetaminophen (TYLENOL) 325 MG Tab Take 2 Tablets by mouth every 6 hours as needed for Mild Pain.      fexofenadine (ALLEGRA) 180 MG tablet Take 180 mg by mouth 1 time a day as needed (Allergies).      atorvastatin (LIPITOR) 20 MG Tab Take 1 Tablet by mouth every evening. 90 Tablet 3     No current facility-administered medications for this visit.     She  has a past medical history of Anxiety, Depression, Fibroid (08/01/2019), GERD (gastroesophageal reflux disease), Hemorrhoids (10/01/2014), High cholesterol, Tachycardia, and Thyroid nodule.         Objective:     Vitals:    08/24/22 1319   BP: 104/68   Pulse: 90   Resp: 16   Temp: 36.7 °C (98.1 °F)   SpO2: 99%     Body mass index is 32.55 kg/m².   Physical Exam:  Constitutional: Alert, no distress.  Skin: Warm, dry, good turgor, no " rashes in visible areas.  Eye: Equal, round and reactive, conjunctiva clear, lids normal.  Respiratory: Unlabored respiratory effort  Abdomen: Soft, obese, drain in place with scant amount of serosanguenous fluid, incisions healing well, slight tenderness to palpation in epigastrum w/o rebound or guarding  Psych: Alert and oriented x3, normal affect and mood.      Results and Imaging: hospital labs/imaging reviewed    Assessment and Plan:   The following treatment plan was discussed    1. Hyperlipidemia, unspecified hyperlipidemia type  She will obtain updated labs after taking lipitor x 2 months  -atorvastatin 20 mg daily, will uptrate if needed based on labs  - Lipid Profile; Future  - Referral to Nutrition Services    2. MILLER (nonalcoholic steatohepatitis), fibrosis stage 1a  Discussed weight loss as primary treatment.  Nutrition referral provided.  If she is struggling with weight loss over next few months, discussed she should return to clinic, could consider phentermine  - Referral to Nutrition Services    3. Prediabetes  - Referral to Nutrition Services    4. Class 1 obesity with body mass index (BMI) of 30.0 to 30.9 in adult, unspecified obesity type, unspecified whether serious comorbidity present  As above  - Referral to Nutrition Services    5. S/P cholecystectomy  Hospital course reviewed in detail.  Numerous pt questions regarding imaging and labs answered    6. Abdominal fluid collection  Cont with drain, f/u with surgery this Friday for likely removal.        Followup: Return if symptoms worsen or fail to improve.

## 2022-08-24 NOTE — ASSESSMENT & PLAN NOTE
"Noted on liver biopsy done cholecystectomy.  Per patient, surgeon noticed her liver was \"yellow\" and thought it was about 80% fat.  Biopsy revealed stage 1a fibrosis.  She would like to work with a dietician to develop an eating plan for this issue as well as prediabetes, weight loss, and post cholecystectomy diet.  "

## 2022-08-24 NOTE — ASSESSMENT & PLAN NOTE
Recently underwent cholecystectomy for acute on chronic cholecystitis.  Post op course complicated by an abdominal fluid collection.  Surgeon is Dr. Frye.

## 2022-09-02 ENCOUNTER — HOSPITAL ENCOUNTER (OUTPATIENT)
Dept: RADIOLOGY | Facility: MEDICAL CENTER | Age: 52
End: 2022-09-02
Attending: NURSE PRACTITIONER
Payer: COMMERCIAL

## 2022-09-02 DIAGNOSIS — R18.8 INTRAABDOMINAL FLUID COLLECTION: ICD-10-CM

## 2022-09-02 PROCEDURE — 700117 HCHG RX CONTRAST REV CODE 255: Performed by: NURSE PRACTITIONER

## 2022-09-02 PROCEDURE — 74160 CT ABDOMEN W/CONTRAST: CPT

## 2022-09-02 RX ADMIN — IOHEXOL 100 ML: 350 INJECTION, SOLUTION INTRAVENOUS at 10:30

## 2022-10-19 ENCOUNTER — HOSPITAL ENCOUNTER (OUTPATIENT)
Dept: LAB | Facility: MEDICAL CENTER | Age: 52
End: 2022-10-19
Attending: INTERNAL MEDICINE
Payer: COMMERCIAL

## 2022-10-19 DIAGNOSIS — E78.5 HYPERLIPIDEMIA, UNSPECIFIED HYPERLIPIDEMIA TYPE: ICD-10-CM

## 2022-10-19 LAB
CHOLEST SERPL-MCNC: 174 MG/DL (ref 100–199)
FASTING STATUS PATIENT QL REPORTED: NORMAL
HDLC SERPL-MCNC: 43 MG/DL
LDLC SERPL CALC-MCNC: 94 MG/DL
TRIGL SERPL-MCNC: 186 MG/DL (ref 0–149)

## 2022-10-19 PROCEDURE — 80061 LIPID PANEL: CPT

## 2022-10-19 PROCEDURE — 36415 COLL VENOUS BLD VENIPUNCTURE: CPT

## 2022-10-20 ENCOUNTER — PATIENT MESSAGE (OUTPATIENT)
Dept: MEDICAL GROUP | Facility: MEDICAL CENTER | Age: 52
End: 2022-10-20
Payer: COMMERCIAL

## 2022-10-20 DIAGNOSIS — E78.5 HYPERLIPIDEMIA, UNSPECIFIED HYPERLIPIDEMIA TYPE: ICD-10-CM

## 2022-10-20 DIAGNOSIS — R73.03 PREDIABETES: ICD-10-CM

## 2022-10-20 DIAGNOSIS — K75.81 NASH (NONALCOHOLIC STEATOHEPATITIS): ICD-10-CM

## 2022-11-05 ENCOUNTER — HOSPITAL ENCOUNTER (OUTPATIENT)
Dept: LAB | Facility: MEDICAL CENTER | Age: 52
End: 2022-11-05
Payer: COMMERCIAL

## 2022-11-05 LAB
ALBUMIN SERPL BCP-MCNC: 4 G/DL (ref 3.2–4.9)
ALBUMIN/GLOB SERPL: 1.2 G/DL
ALP SERPL-CCNC: 92 U/L (ref 30–99)
ALT SERPL-CCNC: 26 U/L (ref 2–50)
ANION GAP SERPL CALC-SCNC: 9 MMOL/L (ref 7–16)
AST SERPL-CCNC: 20 U/L (ref 12–45)
BILIRUB SERPL-MCNC: 0.3 MG/DL (ref 0.1–1.5)
BUN SERPL-MCNC: 15 MG/DL (ref 8–22)
CALCIUM SERPL-MCNC: 9.4 MG/DL (ref 8.5–10.5)
CHLORIDE SERPL-SCNC: 104 MMOL/L (ref 96–112)
CO2 SERPL-SCNC: 27 MMOL/L (ref 20–33)
CREAT SERPL-MCNC: 0.53 MG/DL (ref 0.5–1.4)
FASTING STATUS PATIENT QL REPORTED: NORMAL
GFR SERPLBLD CREATININE-BSD FMLA CKD-EPI: 111 ML/MIN/1.73 M 2
GLOBULIN SER CALC-MCNC: 3.3 G/DL (ref 1.9–3.5)
GLUCOSE SERPL-MCNC: 115 MG/DL (ref 65–99)
POTASSIUM SERPL-SCNC: 4.4 MMOL/L (ref 3.6–5.5)
PROT SERPL-MCNC: 7.3 G/DL (ref 6–8.2)
SODIUM SERPL-SCNC: 140 MMOL/L (ref 135–145)

## 2022-11-05 PROCEDURE — 80053 COMPREHEN METABOLIC PANEL: CPT

## 2022-11-05 PROCEDURE — 36415 COLL VENOUS BLD VENIPUNCTURE: CPT

## 2023-04-05 NOTE — CARE PLAN
The patient is Stable - Low risk of patient condition declining or worsening    Shift Goals  Clinical Goals: ambulation, pain control, bowel movement  Patient Goals: bowel movement, increase oral intake  Family Goals: n/a    Progress made toward(s) clinical / shift goals:  Patient received bowel regimen earlier this morning. IV fluids have been discontinued. Patient denies severe pain and has been able to ambulate well. Lap sites CDI and closed with dermabond.       Problem: Knowledge Deficit - Standard  Goal: Patient and family/care givers will demonstrate understanding of plan of care, disease process/condition, diagnostic tests and medications  8/13/2022 1234 by Judith Medrano R.N.  Outcome: Progressing    Problem: Pain - Standard  Goal: Alleviation of pain or a reduction in pain to the patient’s comfort goal  Outcome: Progressing       Patient is not progressing towards the following goals: Clearance for discharge.       done

## 2023-04-14 ENCOUNTER — APPOINTMENT (RX ONLY)
Dept: URBAN - METROPOLITAN AREA CLINIC 15 | Facility: CLINIC | Age: 53
Setting detail: DERMATOLOGY
End: 2023-04-14

## 2023-04-14 DIAGNOSIS — Z79.899 OTHER LONG TERM (CURRENT) DRUG THERAPY: ICD-10-CM

## 2023-04-14 DIAGNOSIS — L65.0 TELOGEN EFFLUVIUM: ICD-10-CM

## 2023-04-14 DIAGNOSIS — L71.8 OTHER ROSACEA: ICD-10-CM

## 2023-04-14 PROBLEM — D23.39 OTHER BENIGN NEOPLASM OF SKIN OF OTHER PARTS OF FACE: Status: ACTIVE | Noted: 2023-04-14

## 2023-04-14 PROCEDURE — 99212 OFFICE O/P EST SF 10 MIN: CPT

## 2023-04-14 PROCEDURE — ? ORDER TESTS

## 2023-04-14 PROCEDURE — ? COUNSELING

## 2023-04-14 PROCEDURE — ? PRESCRIPTION

## 2023-04-14 RX ORDER — MINOCYCLINE HYDROCHLORIDE 100 MG/1
CAPSULE ORAL BID
Qty: 60 | Refills: 1 | Status: ERX | COMMUNITY
Start: 2023-04-14

## 2023-04-14 RX ADMIN — MINOCYCLINE HYDROCHLORIDE 1: 100 CAPSULE ORAL at 00:00

## 2023-04-14 ASSESSMENT — LOCATION SIMPLE DESCRIPTION DERM
LOCATION SIMPLE: LEFT EYE
LOCATION SIMPLE: RIGHT EYE
LOCATION SIMPLE: POSTERIOR SCALP

## 2023-04-14 ASSESSMENT — LOCATION ZONE DERM
LOCATION ZONE: CONJUNCTIVA
LOCATION ZONE: SCALP
LOCATION ZONE: CORNEA

## 2023-04-14 ASSESSMENT — LOCATION DETAILED DESCRIPTION DERM
LOCATION DETAILED: POSTERIOR MID-PARIETAL SCALP
LOCATION DETAILED: RIGHT LATERAL SCLERA
LOCATION DETAILED: LEFT IRIS

## 2023-04-17 ENCOUNTER — HOSPITAL ENCOUNTER (OUTPATIENT)
Dept: LAB | Facility: MEDICAL CENTER | Age: 53
End: 2023-04-17
Attending: DERMATOLOGY
Payer: COMMERCIAL

## 2023-04-17 LAB
T3 SERPL-MCNC: 123 NG/DL (ref 60–181)
T4 FREE SERPL-MCNC: 1.41 NG/DL (ref 0.93–1.7)
THYROPEROXIDASE AB SERPL-ACNC: 36 IU/ML (ref 0–9)
TSH SERPL DL<=0.005 MIU/L-ACNC: 3.29 UIU/ML (ref 0.38–5.33)

## 2023-04-17 PROCEDURE — 86376 MICROSOMAL ANTIBODY EACH: CPT

## 2023-04-17 PROCEDURE — 84480 ASSAY TRIIODOTHYRONINE (T3): CPT

## 2023-04-17 PROCEDURE — 84439 ASSAY OF FREE THYROXINE: CPT

## 2023-04-17 PROCEDURE — 84443 ASSAY THYROID STIM HORMONE: CPT

## 2023-04-17 PROCEDURE — 36415 COLL VENOUS BLD VENIPUNCTURE: CPT

## 2023-04-18 ENCOUNTER — TELEPHONE (OUTPATIENT)
Dept: MEDICAL GROUP | Facility: MEDICAL CENTER | Age: 53
End: 2023-04-18
Payer: COMMERCIAL

## 2023-04-18 NOTE — TELEPHONE ENCOUNTER
1. Caller Name: Sury Vidal                        Call Back Number: [phone] (home)       How would the patient prefer to be contacted with a response: "[employer]hart message    Pt was under the impression that she had a thyroid ultrasound order, Pt scheduled to have ultrasound completed however imaging does not show any image orders. Pt requesting to have updated ultrasound order placed

## 2023-04-19 ENCOUNTER — APPOINTMENT (OUTPATIENT)
Dept: RADIOLOGY | Facility: MEDICAL CENTER | Age: 53
End: 2023-04-19
Attending: INTERNAL MEDICINE
Payer: COMMERCIAL

## 2023-04-19 ENCOUNTER — HOSPITAL ENCOUNTER (OUTPATIENT)
Dept: LAB | Facility: MEDICAL CENTER | Age: 53
End: 2023-04-19
Attending: INTERNAL MEDICINE
Payer: COMMERCIAL

## 2023-04-19 DIAGNOSIS — R73.03 PREDIABETES: ICD-10-CM

## 2023-04-19 DIAGNOSIS — K75.81 NASH (NONALCOHOLIC STEATOHEPATITIS): ICD-10-CM

## 2023-04-19 DIAGNOSIS — E78.5 HYPERLIPIDEMIA, UNSPECIFIED HYPERLIPIDEMIA TYPE: ICD-10-CM

## 2023-04-19 LAB
ALBUMIN SERPL BCP-MCNC: 4.3 G/DL (ref 3.2–4.9)
ALBUMIN/GLOB SERPL: 1.3 G/DL
ALP SERPL-CCNC: 85 U/L (ref 30–99)
ALT SERPL-CCNC: 36 U/L (ref 2–50)
ANION GAP SERPL CALC-SCNC: 12 MMOL/L (ref 7–16)
AST SERPL-CCNC: 26 U/L (ref 12–45)
BILIRUB SERPL-MCNC: 0.5 MG/DL (ref 0.1–1.5)
BUN SERPL-MCNC: 12 MG/DL (ref 8–22)
CALCIUM ALBUM COR SERPL-MCNC: 9.3 MG/DL (ref 8.5–10.5)
CALCIUM SERPL-MCNC: 9.5 MG/DL (ref 8.5–10.5)
CHLORIDE SERPL-SCNC: 106 MMOL/L (ref 96–112)
CHOLEST SERPL-MCNC: 162 MG/DL (ref 100–199)
CO2 SERPL-SCNC: 25 MMOL/L (ref 20–33)
CREAT SERPL-MCNC: 0.56 MG/DL (ref 0.5–1.4)
EST. AVERAGE GLUCOSE BLD GHB EST-MCNC: 134 MG/DL
FASTING STATUS PATIENT QL REPORTED: NORMAL
GFR SERPLBLD CREATININE-BSD FMLA CKD-EPI: 109 ML/MIN/1.73 M 2
GLOBULIN SER CALC-MCNC: 3.3 G/DL (ref 1.9–3.5)
GLUCOSE SERPL-MCNC: 87 MG/DL (ref 65–99)
HBA1C MFR BLD: 6.3 % (ref 4–5.6)
HDLC SERPL-MCNC: 50 MG/DL
LDLC SERPL CALC-MCNC: 84 MG/DL
POTASSIUM SERPL-SCNC: 4 MMOL/L (ref 3.6–5.5)
PROT SERPL-MCNC: 7.6 G/DL (ref 6–8.2)
SODIUM SERPL-SCNC: 143 MMOL/L (ref 135–145)
TRIGL SERPL-MCNC: 142 MG/DL (ref 0–149)

## 2023-04-19 PROCEDURE — 83036 HEMOGLOBIN GLYCOSYLATED A1C: CPT

## 2023-04-19 PROCEDURE — 36415 COLL VENOUS BLD VENIPUNCTURE: CPT

## 2023-04-19 PROCEDURE — 80053 COMPREHEN METABOLIC PANEL: CPT

## 2023-04-19 PROCEDURE — 80061 LIPID PANEL: CPT

## 2023-04-19 RX ORDER — FLUCONAZOLE 150 MG/1
TABLET ORAL
COMMUNITY
End: 2023-05-18

## 2023-04-19 NOTE — TELEPHONE ENCOUNTER
Looks like she has an order that was placed 5/4/2022.  As long as she schedules before May 4, should still be valid.    Edelmira Saucedo M.D.

## 2023-04-20 ENCOUNTER — PHARMACY VISIT (OUTPATIENT)
Dept: PHARMACY | Facility: MEDICAL CENTER | Age: 53
End: 2023-04-20
Payer: COMMERCIAL

## 2023-04-20 ENCOUNTER — OFFICE VISIT (OUTPATIENT)
Dept: MEDICAL GROUP | Facility: MEDICAL CENTER | Age: 53
End: 2023-04-20
Attending: INTERNAL MEDICINE
Payer: COMMERCIAL

## 2023-04-20 VITALS
OXYGEN SATURATION: 95 % | DIASTOLIC BLOOD PRESSURE: 82 MMHG | SYSTOLIC BLOOD PRESSURE: 122 MMHG | RESPIRATION RATE: 16 BRPM | HEIGHT: 62 IN | BODY MASS INDEX: 36.8 KG/M2 | WEIGHT: 200 LBS | HEART RATE: 88 BPM | TEMPERATURE: 97.5 F

## 2023-04-20 DIAGNOSIS — E78.5 HYPERLIPIDEMIA, UNSPECIFIED HYPERLIPIDEMIA TYPE: ICD-10-CM

## 2023-04-20 DIAGNOSIS — E66.9 CLASS 1 OBESITY WITH BODY MASS INDEX (BMI) OF 30.0 TO 30.9 IN ADULT, UNSPECIFIED OBESITY TYPE, UNSPECIFIED WHETHER SERIOUS COMORBIDITY PRESENT: ICD-10-CM

## 2023-04-20 DIAGNOSIS — R73.03 PREDIABETES: ICD-10-CM

## 2023-04-20 DIAGNOSIS — E04.1 THYROID NODULE: ICD-10-CM

## 2023-04-20 DIAGNOSIS — F41.9 ANXIETY: ICD-10-CM

## 2023-04-20 DIAGNOSIS — G47.00 INSOMNIA, UNSPECIFIED TYPE: ICD-10-CM

## 2023-04-20 PROBLEM — R18.8 ABDOMINAL FLUID COLLECTION: Status: RESOLVED | Noted: 2022-08-18 | Resolved: 2023-04-20

## 2023-04-20 PROCEDURE — RXMED WILLOW AMBULATORY MEDICATION CHARGE: Performed by: INTERNAL MEDICINE

## 2023-04-20 PROCEDURE — 99213 OFFICE O/P EST LOW 20 MIN: CPT | Performed by: INTERNAL MEDICINE

## 2023-04-20 PROCEDURE — 99214 OFFICE O/P EST MOD 30 MIN: CPT | Performed by: INTERNAL MEDICINE

## 2023-04-20 RX ORDER — HYDROXYZINE HYDROCHLORIDE 25 MG/1
12.5-25 TABLET, FILM COATED ORAL NIGHTLY PRN
Qty: 30 TABLET | Refills: 1 | Status: SHIPPED | OUTPATIENT
Start: 2023-04-20 | End: 2023-05-18 | Stop reason: SDUPTHER

## 2023-04-20 RX ORDER — SEMAGLUTIDE 0.68 MG/ML
INJECTION, SOLUTION SUBCUTANEOUS
Qty: 3 ML | Refills: 1 | Status: SHIPPED | OUTPATIENT
Start: 2023-04-20 | End: 2023-05-18

## 2023-04-20 ASSESSMENT — FIBROSIS 4 INDEX: FIB4 SCORE: 0.71

## 2023-04-20 NOTE — ASSESSMENT & PLAN NOTE
She reports a lot of difficulty falling asleep at night.  States that when she is feeling anxious her mind is racing and she cannot stop thinking.  She has not tried any over-the-counter medications or supplements.  Sometimes will lay in bed several hours.  Reports some sleepless nights.  On a good night, still feels unrested.

## 2023-04-21 PROBLEM — Z85.828 HISTORY OF BASAL CELL CANCER: Status: ACTIVE | Noted: 2019-08-30

## 2023-04-21 NOTE — PROGRESS NOTES
Subjective:   Sury Viadl is a 53 y.o. female here today for worsening insomnia, anxiety, feeling unwell, review recent labs    Insomnia  She reports a lot of difficulty falling asleep at night.  States that when she is feeling anxious her mind is racing and she cannot stop thinking.  She has not tried any over-the-counter medications or supplements.  Sometimes will lay in bed several hours.  Reports some sleepless nights.  On a good night, still feels unrested.     Obesity  Patient reports continued weight gain which is reflected in her weight today.  She has had difficulty achieving successful weight loss with diet and exercise in the past.  Right now, she states that she is trying to follow diet and regular exercise pattern.  She has never tried any medication for weight loss.  Her A1c is also elevated and she is interested in trying Ozempic.    Prediabetes  Most recent labs show increase in A1c to 6.3.    Thyroid nodule  Last thyroid ultrasound was in 2017 showing a stable 1.2 cm nodule on the right.  We had ordered additional imaging but she never obtained it last year for follow-up although the nodule had been stable.  She has also had it biopsied in the past but specimen was insufficient.  Recently she went to her dermatologist due to concern for ocular rosacea and she was concerned for a primary thyroid process.  Per patient, she thought that patient's eyes were bulging a little more than previously patient was also complaining of fatigue, dry skin, hair loss.  She recommended thyroid labs which the patient obtained.  TSH, T3, T4 are all within normal limits but her anti-TPO antibody is mildly elevated as it has been in the past.  She has never required thyroid supplementation.    Anxiety  Reports worsening anxiety.  At this time she is not seeing a therapist or taking medication.  She reports feeling anxious all the time which has been a problem for her for years.  Previously we had her on Lexapro  which had worked for her at 10 mg but she does not want to restart medication at this time.  She is amenable to trying some hydroxyzine.    Hyperlipidemia  She continues on her atorvastatin 20 mg daily.  Lipids are well controlled.       Current medicines (including changes today)  Current Outpatient Medications   Medication Sig Dispense Refill    hydrOXYzine HCl (ATARAX) 25 MG Tab Take 0.5-1 Tablets by mouth at bedtime as needed (insomnia). 30 Tablet 1    Semaglutide,0.25 or 0.5MG/DOS, (OZEMPIC, 0.25 OR 0.5 MG/DOSE,) 2 MG/3ML Solution Pen-injector Inject 0.25 mg weekly x 2 weeks then increase to 0.5 mg weekly 3 mL 1    fluconazole (DIFLUCAN) 150 MG tablet fluconazole 150 mg tablet   TAKE 1 TABLET BY MOUTH EVERY DAY      acetaminophen (TYLENOL) 325 MG Tab Take 2 Tablets by mouth every 6 hours as needed for Mild Pain.      fexofenadine (ALLEGRA) 180 MG tablet Take 180 mg by mouth 1 time a day as needed (Allergies).      atorvastatin (LIPITOR) 20 MG Tab Take 1 Tablet by mouth every evening. 90 Tablet 3     No current facility-administered medications for this visit.     She  has a past medical history of Anxiety, Depression, Fibroid (08/01/2019), GERD (gastroesophageal reflux disease), Hemorrhoids (10/01/2014), High cholesterol, Tachycardia, and Thyroid nodule.         Objective:     Vitals:    04/20/23 1340   BP: 122/82   Pulse: 88   Resp: 16   Temp: 36.4 °C (97.5 °F)   SpO2: 95%     Body mass index is 36.57 kg/m².   Physical Exam:  Constitutional: Alert, no distress.  Skin: Warm, dry, good turgor, no rashes in visible areas.  Eye: Equal, round and reactive, conjunctiva clear, lids normal.  Psych: Alert and oriented x3, anxious appearing      Results and Imaging:   Hospital Outpatient Visit on 04/19/2023   Component Date Value Ref Range Status    Sodium 04/19/2023 143  135 - 145 mmol/L Final    Potassium 04/19/2023 4.0  3.6 - 5.5 mmol/L Final    Chloride 04/19/2023 106  96 - 112 mmol/L Final    Co2 04/19/2023 25  20  - 33 mmol/L Final    Anion Gap 04/19/2023 12.0  7.0 - 16.0 Final    Glucose 04/19/2023 87  65 - 99 mg/dL Final    Bun 04/19/2023 12  8 - 22 mg/dL Final    Creatinine 04/19/2023 0.56  0.50 - 1.40 mg/dL Final    Calcium 04/19/2023 9.5  8.5 - 10.5 mg/dL Final    AST(SGOT) 04/19/2023 26  12 - 45 U/L Final    ALT(SGPT) 04/19/2023 36  2 - 50 U/L Final    Alkaline Phosphatase 04/19/2023 85  30 - 99 U/L Final    Total Bilirubin 04/19/2023 0.5  0.1 - 1.5 mg/dL Final    Albumin 04/19/2023 4.3  3.2 - 4.9 g/dL Final    Total Protein 04/19/2023 7.6  6.0 - 8.2 g/dL Final    Globulin 04/19/2023 3.3  1.9 - 3.5 g/dL Final    A-G Ratio 04/19/2023 1.3  g/dL Final    Glycohemoglobin 04/19/2023 6.3 (H)  4.0 - 5.6 % Final    Comment: Increased risk for diabetes:  5.7 -6.4%  Diabetes:  >6.4%  Glycemic control for adults with diabetes:  <7.0%    The above interpretations are per ADA guidelines.  Diagnosis  of diabetes mellitus on the basis of elevated Hemoglobin A1c  should be confirmed by repeating the Hb A1c test.      Est Avg Glucose 04/19/2023 134  mg/dL Final    Comment: The eAG calculation is based on the A1c-Derived Daily Glucose  (ADAG) study.  See the ADA's website for additional information.      Cholesterol,Tot 04/19/2023 162  100 - 199 mg/dL Final    Triglycerides 04/19/2023 142  0 - 149 mg/dL Final    HDL 04/19/2023 50  >=40 mg/dL Final    LDL 04/19/2023 84  <100 mg/dL Final    Fasting Status 04/19/2023 Fasting   Final    Correct Calcium 04/19/2023 9.3  8.5 - 10.5 mg/dL Final    GFR (CKD-EPI) 04/19/2023 109  >60 mL/min/1.73 m 2 Final    Comment: Estimated Glomerular Filtration Rate is calculated using  race neutral CKD-EPI 2021 equation per NKF-ASN recommendations.           Assessment and Plan:   The following treatment plan was discussed    1. Insomnia, unspecified type  We discussed options for treatment of insomnia.  At this time she is most comfortable with trial of over-the-counter melatonin and if this is not effective  she would like to try hydroxyzine for its anxiolytic benefit as well.  -Trial OTC melatonin  - hydrOXYzine HCl (ATARAX) 25 MG Tab; Take 0.5-1 Tablets by mouth at bedtime as needed (insomnia).  Dispense: 30 Tablet; Refill: 1    2. Prediabetes  Given weight gain and prediabetes, discussed starting Ozempic for her.  She is amenable to this after we reviewed the side effects and benefits.  We will follow-up in 4 weeks, uptitrate if needed.  - Semaglutide,0.25 or 0.5MG/DOS, (OZEMPIC, 0.25 OR 0.5 MG/DOSE,) 2 MG/3ML Solution Pen-injector; Inject 0.25 mg weekly x 2 weeks then increase to 0.5 mg weekly  Dispense: 3 mL; Refill: 1    3. Class 1 obesity with body mass index (BMI) of 30.0 to 30.9 in adult, unspecified obesity type, unspecified whether serious comorbidity present  - Semaglutide,0.25 or 0.5MG/DOS, (OZEMPIC, 0.25 OR 0.5 MG/DOSE,) 2 MG/3ML Solution Pen-injector; Inject 0.25 mg weekly x 2 weeks then increase to 0.5 mg weekly  Dispense: 3 mL; Refill: 1    4. Thyroid nodule  She has her thyroid ultrasound scheduled and we will follow-up on results.  Reassurance was provided today that she does not need thyroid supplementation and although she has positive anti-TPO antibody, her Hashimoto's is not active.    5. Anxiety  Uncontrolled.  At this time, patient would like to focus on her sleep and weight loss.  At follow-up in 1 month we will revisit, consider therapy/Lexapro.        Followup: Return in about 4 weeks (around 5/18/2023) for weight loss, anxiety, insomnia.

## 2023-04-21 NOTE — ASSESSMENT & PLAN NOTE
Last thyroid ultrasound was in 2017 showing a stable 1.2 cm nodule on the right.  We had ordered additional imaging but she never obtained it last year for follow-up although the nodule had been stable.  She has also had it biopsied in the past but specimen was insufficient.  Recently she went to her dermatologist due to concern for ocular rosacea and she was concerned for a primary thyroid process.  Per patient, she thought that patient's eyes were bulging a little more than previously patient was also complaining of fatigue, dry skin, hair loss.  She recommended thyroid labs which the patient obtained.  TSH, T3, T4 are all within normal limits but her anti-TPO antibody is mildly elevated as it has been in the past.  She has never required thyroid supplementation.

## 2023-04-21 NOTE — ASSESSMENT & PLAN NOTE
Patient reports continued weight gain which is reflected in her weight today.  She has had difficulty achieving successful weight loss with diet and exercise in the past.  Right now, she states that she is trying to follow diet and regular exercise pattern.  She has never tried any medication for weight loss.  Her A1c is also elevated and she is interested in trying Ozempic.

## 2023-04-21 NOTE — ASSESSMENT & PLAN NOTE
Reports worsening anxiety.  At this time she is not seeing a therapist or taking medication.  She reports feeling anxious all the time which has been a problem for her for years.  Previously we had her on Lexapro which had worked for her at 10 mg but she does not want to restart medication at this time.  She is amenable to trying some hydroxyzine.

## 2023-05-04 ENCOUNTER — HOSPITAL ENCOUNTER (OUTPATIENT)
Dept: RADIOLOGY | Facility: MEDICAL CENTER | Age: 53
End: 2023-05-04
Attending: INTERNAL MEDICINE
Payer: COMMERCIAL

## 2023-05-04 DIAGNOSIS — E04.1 THYROID NODULE: ICD-10-CM

## 2023-05-04 PROCEDURE — 76536 US EXAM OF HEAD AND NECK: CPT

## 2023-05-18 ENCOUNTER — OFFICE VISIT (OUTPATIENT)
Dept: MEDICAL GROUP | Facility: MEDICAL CENTER | Age: 53
End: 2023-05-18
Attending: INTERNAL MEDICINE
Payer: COMMERCIAL

## 2023-05-18 VITALS
HEIGHT: 62 IN | DIASTOLIC BLOOD PRESSURE: 78 MMHG | HEART RATE: 100 BPM | RESPIRATION RATE: 16 BRPM | OXYGEN SATURATION: 95 % | BODY MASS INDEX: 36.8 KG/M2 | TEMPERATURE: 97.5 F | WEIGHT: 200 LBS | SYSTOLIC BLOOD PRESSURE: 122 MMHG

## 2023-05-18 DIAGNOSIS — E66.9 CLASS 1 OBESITY WITH BODY MASS INDEX (BMI) OF 30.0 TO 30.9 IN ADULT, UNSPECIFIED OBESITY TYPE, UNSPECIFIED WHETHER SERIOUS COMORBIDITY PRESENT: ICD-10-CM

## 2023-05-18 DIAGNOSIS — G47.00 INSOMNIA, UNSPECIFIED TYPE: ICD-10-CM

## 2023-05-18 DIAGNOSIS — E04.1 THYROID NODULE: ICD-10-CM

## 2023-05-18 DIAGNOSIS — R73.03 PREDIABETES: ICD-10-CM

## 2023-05-18 DIAGNOSIS — F41.9 ANXIETY: ICD-10-CM

## 2023-05-18 PROCEDURE — 3074F SYST BP LT 130 MM HG: CPT | Performed by: INTERNAL MEDICINE

## 2023-05-18 PROCEDURE — 3078F DIAST BP <80 MM HG: CPT | Performed by: INTERNAL MEDICINE

## 2023-05-18 PROCEDURE — 99214 OFFICE O/P EST MOD 30 MIN: CPT | Performed by: INTERNAL MEDICINE

## 2023-05-18 PROCEDURE — 99212 OFFICE O/P EST SF 10 MIN: CPT | Performed by: INTERNAL MEDICINE

## 2023-05-18 RX ORDER — ESCITALOPRAM OXALATE 10 MG/1
10 TABLET ORAL DAILY
Qty: 30 TABLET | Refills: 5 | Status: SHIPPED
Start: 2023-05-18 | End: 2024-01-25

## 2023-05-18 RX ORDER — HYDROXYZINE HYDROCHLORIDE 25 MG/1
12.5-25 TABLET, FILM COATED ORAL NIGHTLY PRN
Qty: 30 TABLET | Refills: 5 | Status: SHIPPED | OUTPATIENT
Start: 2023-05-18

## 2023-05-18 ASSESSMENT — FIBROSIS 4 INDEX: FIB4 SCORE: 0.71

## 2023-05-18 NOTE — ASSESSMENT & PLAN NOTE
We reviewed ultrasound report which showed a T RADS 4 nodule that is stable at 1.2 cm and heterogenously enlarged thyroid which is unchanged from prior imaging.  Endocrinology recommended repeat ultrasound in 2 years.

## 2023-05-18 NOTE — ASSESSMENT & PLAN NOTE
She reports improvement with the hydroxyzine which she is using as needed.  Typically taking half a tablet.  Reports some mild grogginess with it but tolerable.

## 2023-05-18 NOTE — PROGRESS NOTES
Subjective:   Sury Vidal is a 53 y.o. female here today for f/u anxiety, weight loss    Anxiety  She reports some mild improvement in her anxiety since starting on the hydroxyzine for sleep and getting a little bit better sleep quality however she still feels like it is affecting her on a day-to-day basis.  She is interested in seeing a therapist and also restarting Lexapro which was effective for her in the past.    Insomnia  She reports improvement with the hydroxyzine which she is using as needed.  Typically taking half a tablet.  Reports some mild grogginess with it but tolerable.    Prediabetes  Most recent labs showed A1c of 6.3.  She has instituted some lifestyle modifications but is not currently on any medication for it.    Obesity  She was not able to afford the Ozempic or Trulicity as both were upwards of $300.    Thyroid nodule  We reviewed ultrasound report which showed a T RADS 4 nodule that is stable at 1.2 cm and heterogenously enlarged thyroid which is unchanged from prior imaging.  Endocrinology recommended repeat ultrasound in 2 years.     Current medicines (including changes today)  Current Outpatient Medications   Medication Sig Dispense Refill    escitalopram (LEXAPRO) 10 MG Tab Take 1 Tablet by mouth every day. 30 Tablet 5    hydrOXYzine HCl (ATARAX) 25 MG Tab Take 0.5-1 Tablets by mouth at bedtime as needed (insomnia). 30 Tablet 5    acetaminophen (TYLENOL) 325 MG Tab Take 2 Tablets by mouth every 6 hours as needed for Mild Pain.      fexofenadine (ALLEGRA) 180 MG tablet Take 180 mg by mouth 1 time a day as needed (Allergies).      atorvastatin (LIPITOR) 20 MG Tab Take 1 Tablet by mouth every evening. 90 Tablet 3     No current facility-administered medications for this visit.     She  has a past medical history of Anxiety, Depression, Fibroid (08/01/2019), GERD (gastroesophageal reflux disease), Hemorrhoids (10/01/2014), High cholesterol, Tachycardia, and Thyroid nodule.          Objective:     Vitals:    05/18/23 1348   BP: 122/78   Pulse: 100   Resp: 16   Temp: 36.4 °C (97.5 °F)   SpO2: 95%     Body mass index is 36.57 kg/m².   Physical Exam:  Constitutional: Alert, no distress.  Skin: Warm, dry, good turgor, no rashes in visible areas.  Eye: Equal, round and reactive, conjunctiva clear, lids normal.  Psych: Alert and oriented x3, normal affect and mood.      Results and Imaging:   Hospital Outpatient Visit on 04/19/2023   Component Date Value Ref Range Status    Sodium 04/19/2023 143  135 - 145 mmol/L Final    Potassium 04/19/2023 4.0  3.6 - 5.5 mmol/L Final    Chloride 04/19/2023 106  96 - 112 mmol/L Final    Co2 04/19/2023 25  20 - 33 mmol/L Final    Anion Gap 04/19/2023 12.0  7.0 - 16.0 Final    Glucose 04/19/2023 87  65 - 99 mg/dL Final    Bun 04/19/2023 12  8 - 22 mg/dL Final    Creatinine 04/19/2023 0.56  0.50 - 1.40 mg/dL Final    Calcium 04/19/2023 9.5  8.5 - 10.5 mg/dL Final    AST(SGOT) 04/19/2023 26  12 - 45 U/L Final    ALT(SGPT) 04/19/2023 36  2 - 50 U/L Final    Alkaline Phosphatase 04/19/2023 85  30 - 99 U/L Final    Total Bilirubin 04/19/2023 0.5  0.1 - 1.5 mg/dL Final    Albumin 04/19/2023 4.3  3.2 - 4.9 g/dL Final    Total Protein 04/19/2023 7.6  6.0 - 8.2 g/dL Final    Globulin 04/19/2023 3.3  1.9 - 3.5 g/dL Final    A-G Ratio 04/19/2023 1.3  g/dL Final    Glycohemoglobin 04/19/2023 6.3 (H)  4.0 - 5.6 % Final    Comment: Increased risk for diabetes:  5.7 -6.4%  Diabetes:  >6.4%  Glycemic control for adults with diabetes:  <7.0%    The above interpretations are per ADA guidelines.  Diagnosis  of diabetes mellitus on the basis of elevated Hemoglobin A1c  should be confirmed by repeating the Hb A1c test.      Est Avg Glucose 04/19/2023 134  mg/dL Final    Comment: The eAG calculation is based on the A1c-Derived Daily Glucose  (ADAG) study.  See the ADA's website for additional information.      Cholesterol,Tot 04/19/2023 162  100 - 199 mg/dL Final    Triglycerides  04/19/2023 142  0 - 149 mg/dL Final    HDL 04/19/2023 50  >=40 mg/dL Final    LDL 04/19/2023 84  <100 mg/dL Final    Fasting Status 04/19/2023 Fasting   Final    Correct Calcium 04/19/2023 9.3  8.5 - 10.5 mg/dL Final    GFR (CKD-EPI) 04/19/2023 109  >60 mL/min/1.73 m 2 Final    Comment: Estimated Glomerular Filtration Rate is calculated using  race neutral CKD-EPI 2021 equation per NKF-ASN recommendations.           Assessment and Plan:   The following treatment plan was discussed    1. Prediabetes  She will continue with lifestyle modifications we will obtain updated labs in 3 months  - HEMOGLOBIN A1C; Future    2. Anxiety  Uncontrolled.  We will restart Lexapro which was effective for her in the past and continue hydroxyzine as needed.  New referral placed to therapy.  -Continue hydroxyzine as needed  - escitalopram (LEXAPRO) 10 MG Tab; Take 1 Tablet by mouth every day.  Dispense: 30 Tablet; Refill: 5  - Referral to Psychology    3. Insomnia, unspecified type  - hydrOXYzine HCl (ATARAX) 25 MG Tab; Take 0.5-1 Tablets by mouth at bedtime as needed (insomnia).  Dispense: 30 Tablet; Refill: 5    4. Class 1 obesity with body mass index (BMI) of 30.0 to 30.9 in adult, unspecified obesity type, unspecified whether serious comorbidity present  At this point, we will hold off on the Ozempic or Trulicity as they are cost prohibitive however we discussed that if her A1c does rise to meet criteria for diabetes she would qualify for a much reduced price.  For now, she will work on diet and exercise.    5. Thyroid nodule  Reviewed radiology and endocrinology recommendations to repeat ultrasound in May 2025.        Followup: Return if symptoms worsen or fail to improve.

## 2023-05-18 NOTE — ASSESSMENT & PLAN NOTE
She reports some mild improvement in her anxiety since starting on the hydroxyzine for sleep and getting a little bit better sleep quality however she still feels like it is affecting her on a day-to-day basis.  She is interested in seeing a therapist and also restarting Lexapro which was effective for her in the past.

## 2023-05-18 NOTE — ASSESSMENT & PLAN NOTE
Most recent labs showed A1c of 6.3.  She has instituted some lifestyle modifications but is not currently on any medication for it.

## 2023-07-13 DIAGNOSIS — E78.2 MIXED HYPERLIPIDEMIA: ICD-10-CM

## 2023-07-13 RX ORDER — ATORVASTATIN CALCIUM 20 MG/1
TABLET, FILM COATED ORAL
Qty: 90 TABLET | Refills: 0 | Status: SHIPPED | OUTPATIENT
Start: 2023-07-13 | End: 2023-10-10

## 2023-07-13 NOTE — TELEPHONE ENCOUNTER
Received request via: Pharmacy    Was the patient seen in the last year in this department? Yes    Does the patient have an active prescription (recently filled or refills available) for medication(s) requested? No    Does the patient have [facility] Plus and need 100 day supply (blood pressure, diabetes and cholesterol meds only)? Patient does not have [facility]

## 2023-08-15 ENCOUNTER — OFFICE VISIT (OUTPATIENT)
Dept: MEDICAL GROUP | Facility: MEDICAL CENTER | Age: 53
End: 2023-08-15
Attending: FAMILY MEDICINE
Payer: COMMERCIAL

## 2023-08-15 VITALS
RESPIRATION RATE: 16 BRPM | HEART RATE: 84 BPM | TEMPERATURE: 97.5 F | SYSTOLIC BLOOD PRESSURE: 116 MMHG | WEIGHT: 200 LBS | OXYGEN SATURATION: 97 % | HEIGHT: 62 IN | BODY MASS INDEX: 36.8 KG/M2 | DIASTOLIC BLOOD PRESSURE: 78 MMHG

## 2023-08-15 DIAGNOSIS — M79.89 LEFT AXILLARY SWELLING: ICD-10-CM

## 2023-08-15 DIAGNOSIS — Z12.31 SCREENING MAMMOGRAM, ENCOUNTER FOR: ICD-10-CM

## 2023-08-15 PROCEDURE — 3078F DIAST BP <80 MM HG: CPT | Performed by: FAMILY MEDICINE

## 2023-08-15 PROCEDURE — 99212 OFFICE O/P EST SF 10 MIN: CPT | Performed by: FAMILY MEDICINE

## 2023-08-15 PROCEDURE — 99213 OFFICE O/P EST LOW 20 MIN: CPT | Performed by: FAMILY MEDICINE

## 2023-08-15 PROCEDURE — 3074F SYST BP LT 130 MM HG: CPT | Performed by: FAMILY MEDICINE

## 2023-08-15 ASSESSMENT — FIBROSIS 4 INDEX: FIB4 SCORE: 0.71

## 2023-08-15 NOTE — PROGRESS NOTES
"Subjective   Chief Complaint   Patient presents with    Lump     Bilateral axillary        HPI:   Sury presents today with    Left axillary swelling  This is new issue for patient ongoing for past several months. More recently has developed some tenderness to palpation to left armpit area. Right underarm is normal. She is more concerned with left underarm area as she has tenderness to deep palpation. She switched to ammonia-free deodorant but has noticed a difference. She was concerned it might her thyroid levels that have been off that is contributing as she has been fatigued and having issues with weight gain.       Objective   Social History     Tobacco Use    Smoking status: Former     Packs/day: 0.25     Years: 5.00     Pack years: 1.25     Types: Cigarettes     Quit date: 2011     Years since quittin.7    Smokeless tobacco: Never   Vaping Use    Vaping Use: Never used   Substance Use Topics    Alcohol use: No     Comment: few drinks a year    Drug use: No       Exam:  /78   Pulse 84   Temp 36.4 °C (97.5 °F)   Resp 16   Ht 1.575 m (5' 2.01\")   Wt 90.7 kg (200 lb)   LMP 2017   SpO2 97%   BMI 36.57 kg/m²     Physical Exam  Constitutional: Alert, no distress  Skin: bilateral underarms with no discernible mass; left axilla with some tenderness to palpation; patient's area of concern consistent with adipose tissue  Eye: Conjunctiva clear, lids normal  Respiratory: Unlabored respiratory effort, no cough  MSK: Normal gait, moves all extremities  Psych: Alert and oriented x3, normal affect and mood    Allergies   Allergen Reactions    Codeine Itching     Pt states that she is sensative       [facility] # [address] [place] [address] - [address] [place] [id number]  Phone: [phone] Fax: [phone]    [facility] - [address] [address]  Phone: [phone] Fax: [phone]    Current Outpatient Medications   Medication Sig Dispense Refill "    atorvastatin (LIPITOR) 20 MG Tab TAKE ONE TABLET BY MOUTH EVERY EVENING 90 Tablet 0    escitalopram (LEXAPRO) 10 MG Tab Take 1 Tablet by mouth every day. 30 Tablet 5    hydrOXYzine HCl (ATARAX) 25 MG Tab Take 0.5-1 Tablets by mouth at bedtime as needed (insomnia). 30 Tablet 5    acetaminophen (TYLENOL) 325 MG Tab Take 2 Tablets by mouth every 6 hours as needed for Mild Pain.      fexofenadine (ALLEGRA) 180 MG tablet Take 180 mg by mouth 1 time a day as needed (Allergies).       No current facility-administered medications for this visit.       Assessment & Plan    53 y.o. female with the following -     1. Left axillary swelling  - Discussed findings more consistent with adipose tissue; however can further evaluate with ultrasound for better characterization.  - Recommend intensive lifestyle changes to address chronic issues with weight gain, fatigue, and GI upset. If symptoms persist despite making changes can return to clinic to further assess need for additional work up.  - Recommend intensive lifestyle modifications including consumption of:  nutrient-dense foods that are high in fiber (? 14 g of fiber per 1,000 kcal), minimally processed carbohydrates (such as non-starchy vegetables, fruits, legumes, and whole grains), and non-dairy products with minimally added sugar over intake from other carbohydrate sources   Plant based or Mediterranean-style diet rich in monounsaturated and polyunsaturated fats to improve glucose metabolism and reduce cardiovascular disease risk   foods rich in omega-3 fatty acids (such as fatty fish, nuts, and seeds) to prevent/treat cardiovascular disease   replace consumption of sugar-sweetened beverages (including fruit juices) with water or low calorie, no calorie beverages in order to control glycemia and reduce risk for cardiometabolic disease   minimize consumption of foods with added sugars   - Additional recommendations and resources shared via [employer]  -EXTREMITY  NON VASCULAR UNILATERAL LEFT    2. Screening mammogram, encounter for  - MA-SCREENING MAMMO BILAT W/TOMOSYNTHESIS W/CAD      Return if symptoms worsen or fail to improve.    Please note that this dictation was created using voice recognition software. I have made every reasonable attempt to correct obvious errors, but I expect that there are errors of grammar and possibly content that I did not discover before finalizing the note.

## 2023-08-15 NOTE — ASSESSMENT & PLAN NOTE
This is new issue for patient ongoing for past several months. More recently has developed some tenderness to palpation to left armpit area. Right underarm is normal. She is more concerned with left underarm area as she has tenderness to deep palpation. She switched to ammonia-free deodorant but has noticed a difference. She was concerned it might her thyroid levels that have been off that is contributing as she has been fatigued and having issues with weight gain.

## 2023-09-12 ENCOUNTER — HOSPITAL ENCOUNTER (OUTPATIENT)
Dept: RADIOLOGY | Facility: MEDICAL CENTER | Age: 53
End: 2023-09-12
Attending: FAMILY MEDICINE
Payer: COMMERCIAL

## 2023-09-15 DIAGNOSIS — M79.89 LEFT AXILLARY SWELLING: ICD-10-CM

## 2023-09-20 ENCOUNTER — HOSPITAL ENCOUNTER (OUTPATIENT)
Dept: RADIOLOGY | Facility: MEDICAL CENTER | Age: 53
End: 2023-09-20
Payer: COMMERCIAL

## 2023-09-21 ENCOUNTER — APPOINTMENT (OUTPATIENT)
Dept: RADIOLOGY | Facility: MEDICAL CENTER | Age: 53
End: 2023-09-21
Attending: FAMILY MEDICINE
Payer: COMMERCIAL

## 2023-09-26 ENCOUNTER — HOSPITAL ENCOUNTER (OUTPATIENT)
Dept: RADIOLOGY | Facility: MEDICAL CENTER | Age: 53
End: 2023-09-26
Attending: NURSE PRACTITIONER
Payer: COMMERCIAL

## 2023-09-26 DIAGNOSIS — M79.89 LEFT AXILLARY SWELLING: ICD-10-CM

## 2023-09-26 PROCEDURE — 76642 ULTRASOUND BREAST LIMITED: CPT | Mod: LT

## 2023-09-26 PROCEDURE — G0279 TOMOSYNTHESIS, MAMMO: HCPCS

## 2023-10-10 DIAGNOSIS — E78.2 MIXED HYPERLIPIDEMIA: ICD-10-CM

## 2023-10-10 RX ORDER — ATORVASTATIN CALCIUM 20 MG/1
TABLET, FILM COATED ORAL
Qty: 90 TABLET | Refills: 3 | Status: SHIPPED
Start: 2023-10-10 | End: 2024-01-25

## 2023-10-10 NOTE — TELEPHONE ENCOUNTER
Received request via: Pharmacy    Was the patient seen in the last year in this department? Yes    Does the patient have an active prescription (recently filled or refills available) for medication(s) requested? No    Does the patient have [facility] Plus and need 100 day supply (blood pressure, diabetes and cholesterol meds only)? Patient does not have [facility]

## 2023-10-21 NOTE — ASSESSMENT & PLAN NOTE
"As noted and this is a 47-year-old female with history of mixed hyperlipidemia, she is due for fasting labs. She is no longer on a statin. She is using over-the-counter omega-3 fatty acid. She prefers to avoid statin and take anymore \"natural route.\" She continues to work on diet and exercise.  "
47-year-old female who suffers from chronic rhinitis and seasonal allergies. She is currently taking claratin D, her symptoms have improved significantly since adding a decongestant to her regimen this does make her a little jittery, she does have an increased pulse. She continues to have some nasal itching, scratchy throat, sensation of enlarged scratchy tongue which she does tell me has significantly improved with claratin D. She does not take any medications on a regular basis though she does tell me that she takes many herbal supplements. She denies throat swelling, trouble breathing, wheezing, coughing. She has had allergy testing, she has a slight sensitivity to cow milk. Discussed signs and symptoms of anaphylaxis and reasons to go to ER, she verbalizes understanding. At this time I would like her to continue Claritin D, start flonase and monitor for tongue or throat swelling as discussed above.        
Has been over a year since vitamin D was checked, we will at least her labs today. She occasionally takes over-the-counter vitamin D.  
Patient continues to suffer from left foot onychomycosis. She has used over-the-counter antifungal spray without improvement in her symptoms.  She has thickened yellow toenails on fourth and fifth toe as well as her left great toe.  She has no itching or skin changes. She has seen podiatry who recommends diluted bleach.     
Patient continues to work on lifestyle modifications and exercise. She is due for fasting labs.  
Patient reports intermittent stabbing right eye pain, this is insidious in onset, resolves quickly on its own lasting only a few seconds.  She thinks this may be related to dry eye. She does not take any regular medications. She has had no vision changes. She is due to see her optometrist next month.   
This is a 47-year-old female who is here to establish care and discuss multiple acute and chronic issues. She has struggled with anxiety for many years, one point she was on Zoloft and Xanax as needed.  She continues rare use of Xanax which was prescribed by her previous PCP. She is working on stress and anxiety modifications, exercise.  
This is a 47-year-old female who was diagnosed with a thyroid nodule following a [facility] screening. She continues to have yearly thyroid ultrasound, last done in 2016 and normal. She has had a biopsy in 2014, unfortunately sample was insufficient.   
1-2x/week

## 2024-01-25 ENCOUNTER — OFFICE VISIT (OUTPATIENT)
Dept: MEDICAL GROUP | Facility: MEDICAL CENTER | Age: 54
End: 2024-01-25
Attending: INTERNAL MEDICINE
Payer: COMMERCIAL

## 2024-01-25 VITALS
WEIGHT: 215 LBS | HEART RATE: 92 BPM | TEMPERATURE: 97.7 F | RESPIRATION RATE: 16 BRPM | OXYGEN SATURATION: 99 % | DIASTOLIC BLOOD PRESSURE: 78 MMHG | SYSTOLIC BLOOD PRESSURE: 118 MMHG | BODY MASS INDEX: 39.56 KG/M2 | HEIGHT: 62 IN

## 2024-01-25 DIAGNOSIS — R09.81 CHRONIC NASAL CONGESTION: ICD-10-CM

## 2024-01-25 DIAGNOSIS — K75.81 NASH (NONALCOHOLIC STEATOHEPATITIS): ICD-10-CM

## 2024-01-25 DIAGNOSIS — F33.1 MODERATE EPISODE OF RECURRENT MAJOR DEPRESSIVE DISORDER (HCC): ICD-10-CM

## 2024-01-25 DIAGNOSIS — E78.5 HYPERLIPIDEMIA, UNSPECIFIED HYPERLIPIDEMIA TYPE: ICD-10-CM

## 2024-01-25 DIAGNOSIS — H04.123 DRY EYE SYNDROME OF BOTH EYES: ICD-10-CM

## 2024-01-25 DIAGNOSIS — R73.03 PREDIABETES: ICD-10-CM

## 2024-01-25 DIAGNOSIS — R53.83 FATIGUE, UNSPECIFIED TYPE: ICD-10-CM

## 2024-01-25 DIAGNOSIS — E66.9 CLASS 2 OBESITY WITH BODY MASS INDEX (BMI) OF 39.0 TO 39.9 IN ADULT, UNSPECIFIED OBESITY TYPE, UNSPECIFIED WHETHER SERIOUS COMORBIDITY PRESENT: ICD-10-CM

## 2024-01-25 DIAGNOSIS — R06.83 SNORING: ICD-10-CM

## 2024-01-25 PROBLEM — M79.89 LEFT AXILLARY SWELLING: Status: RESOLVED | Noted: 2023-08-15 | Resolved: 2024-01-25

## 2024-01-25 PROBLEM — H02.883 MEIBOMIAN GLAND DYSFUNCTION (MGD) OF BOTH EYES: Status: ACTIVE | Noted: 2024-01-25

## 2024-01-25 PROBLEM — H04.129 DRY EYE SYNDROME: Status: ACTIVE | Noted: 2024-01-25

## 2024-01-25 PROBLEM — H02.886 MEIBOMIAN GLAND DYSFUNCTION (MGD) OF BOTH EYES: Status: ACTIVE | Noted: 2024-01-25

## 2024-01-25 PROCEDURE — 3074F SYST BP LT 130 MM HG: CPT | Performed by: INTERNAL MEDICINE

## 2024-01-25 PROCEDURE — 99213 OFFICE O/P EST LOW 20 MIN: CPT | Performed by: INTERNAL MEDICINE

## 2024-01-25 PROCEDURE — 99214 OFFICE O/P EST MOD 30 MIN: CPT | Performed by: INTERNAL MEDICINE

## 2024-01-25 PROCEDURE — 3078F DIAST BP <80 MM HG: CPT | Performed by: INTERNAL MEDICINE

## 2024-01-25 RX ORDER — LOTEPREDNOL ETABONATE 5 MG/ML
SUSPENSION/ DROPS OPHTHALMIC
COMMUNITY
Start: 2024-01-03 | End: 2024-01-25

## 2024-01-25 RX ORDER — FLUTICASONE PROPIONATE 50 MCG
1 SPRAY, SUSPENSION (ML) NASAL DAILY
COMMUNITY

## 2024-01-25 RX ORDER — CITALOPRAM HYDROBROMIDE 10 MG/1
10 TABLET ORAL DAILY
Qty: 30 TABLET | Refills: 2 | Status: SHIPPED | OUTPATIENT
Start: 2024-01-25

## 2024-01-25 RX ORDER — ROSUVASTATIN CALCIUM 20 MG/1
20 TABLET, COATED ORAL EVERY EVENING
Qty: 30 TABLET | Refills: 11 | Status: SHIPPED
Start: 2024-01-25 | End: 2024-03-01

## 2024-01-25 ASSESSMENT — FIBROSIS 4 INDEX: FIB4 SCORE: 0.71

## 2024-01-25 NOTE — ASSESSMENT & PLAN NOTE
Working with her optometrist, states that she has tried multiple treatments including steroid eyedrops, antihistamine eyedrops, and over-the-counter lubricating eyedrops.  She was also prescribed Restasis but she states that it was too expensive.  She is working with her optometrist to find an alternative.  States that the steroid eyedrops helped however they increased her intraocular pressures so she could not use them anymore.  She is wondering if this could be related to her atorvastatin.  She read online that it could be associated and this is the only thing that has changed since the onset of her dry eyes.  Her dry eyes are quite disturbing to her and she would like to try switching her cholesterol medication even if the chance that it is contributing is low..

## 2024-01-25 NOTE — ASSESSMENT & PLAN NOTE
She reports worsening fatigue, trouble concentrating, trouble with memory.  Is not sleeping well.  Endorses depression.  Tried the hydroxyzine to help her sleep but felt drowsy in the morning.  Last labs showed normal TSH however she did have positive anti-TPO antibody and she has some thyroid nodules.  She has also been experiencing weight gain.  She denies easy bleeding or bruising.  Denies shortness of breath, chest pain.

## 2024-01-26 NOTE — ASSESSMENT & PLAN NOTE
Her last A1c was elevated at 6.3 in March.  She is not currently on any medication for glycemic control.

## 2024-01-26 NOTE — ASSESSMENT & PLAN NOTE
Was previously taking atorvastatin 20 mg daily but concerned it is causing dry eyes as above.  She would like to try an alternative.

## 2024-01-26 NOTE — ASSESSMENT & PLAN NOTE
She continues to report poor sleep quality, interrupted sleep.  Sleeps in a recliner which helps her however she still snores.  Have encouraged her to get tested for sleep apnea multiple times in the past but she has not pursued this.  She reports a lot of fatigue, trouble with her memory, difficulty concentrating during the day.

## 2024-01-26 NOTE — ASSESSMENT & PLAN NOTE
She reports chronically feeling congested.  She has tried Allegra without much improvement.  States that when she was using steroid eyedrops from her optometrist, she had improvement in her congestion.  Has not used a nasal spray consistently.  Has gotten better with intranasal saline but only temporarily.

## 2024-01-26 NOTE — PROGRESS NOTES
Subjective:   Sury Vidal is a 53 y.o. female here today for fatigue, depression, eye symptoms, chronic issues below    Dry eye syndrome  Working with her optometrist, states that she has tried multiple treatments including steroid eyedrops, antihistamine eyedrops, and over-the-counter lubricating eyedrops.  She was also prescribed Restasis but she states that it was too expensive.  She is working with her optometrist to find an alternative.  States that the steroid eyedrops helped however they increased her intraocular pressures so she could not use them anymore.  She is wondering if this could be related to her atorvastatin.  She read online that it could be associated and this is the only thing that has changed since the onset of her dry eyes.  Her dry eyes are quite disturbing to her and she would like to try switching her cholesterol medication even if the chance that it is contributing is low..    Fatigue  She reports worsening fatigue, trouble concentrating, trouble with memory.  Is not sleeping well.  Endorses depression.  Tried the hydroxyzine to help her sleep but felt drowsy in the morning.  Last labs showed normal TSH however she did have positive anti-TPO antibody and she has some thyroid nodules.  She has also been experiencing weight gain.  She denies easy bleeding or bruising.  Denies shortness of breath, chest pain.    Moderate episode of recurrent major depressive disorder (HCC)  She reports depression has significantly worsened lately.  She is struggling with motivation and experiencing anhedonia and depressed mood on a regular basis.  Previously we had prescribed Lexapro but she never took it consistently and reports probably only taking it for a few weeks.  Her mom takes Celexa and she states that this works well for her.  Patient is interested in trying the Celexa.    Chronic nasal congestion  She reports chronically feeling congested.  She has tried Allegra without much improvement.   States that when she was using steroid eyedrops from her optometrist, she had improvement in her congestion.  Has not used a nasal spray consistently.  Has gotten better with intranasal saline but only temporarily.    Hyperlipidemia  Was previously taking atorvastatin 20 mg daily but concerned it is causing dry eyes as above.  She would like to try an alternative.    Snoring  She continues to report poor sleep quality, interrupted sleep.  Sleeps in a recliner which helps her however she still snores.  Have encouraged her to get tested for sleep apnea multiple times in the past but she has not pursued this.  She reports a lot of fatigue, trouble with her memory, difficulty concentrating during the day.    Prediabetes  Her last A1c was elevated at 6.3 in March.  She is not currently on any medication for glycemic control.    MILLER (nonalcoholic steatohepatitis), fibrosis stage 1a  [facility].  They recommended a yearly FibroScan and she plans to follow-up with them in the near future for this.  Unfortunately, she has gained weight since her last visit.       Current medicines (including changes today)  Current Outpatient Medications   Medication Sig Dispense Refill    rosuvastatin (CRESTOR) 20 MG Tab Take 1 Tablet by mouth every evening. 30 Tablet 11    fluticasone (FLONASE) 50 MCG/ACT nasal spray Administer 1 Spray into affected nostril(S) every day.      citalopram (CELEXA) 10 MG tablet Take 1 Tablet by mouth every day. 30 Tablet 2    hydrOXYzine HCl (ATARAX) 25 MG Tab Take 0.5-1 Tablets by mouth at bedtime as needed (insomnia). 30 Tablet 5    acetaminophen (TYLENOL) 325 MG Tab Take 2 Tablets by mouth every 6 hours as needed for Mild Pain.      fexofenadine (ALLEGRA) 180 MG tablet Take 180 mg by mouth 1 time a day as needed (Allergies).       No current facility-administered medications for this visit.     She  has a past medical history of Anxiety, Depression, Fibroid  (08/01/2019), GERD (gastroesophageal reflux disease), Hemorrhoids (10/01/2014), High cholesterol, Tachycardia, and Thyroid nodule.         Objective:     Vitals:    01/25/24 1354   BP: 118/78   Pulse: 92   Resp: 16   Temp: 36.5 °C (97.7 °F)   SpO2: 99%     Body mass index is 39.31 kg/m².   Physical Exam:  Constitutional: Alert, no distress.  Skin: Warm, dry, good turgor, no rashes in visible areas.  Eye: Equal, round and reactive, conjunctiva clear, lids normal.  ENT: lower turbinates moderately swollen/erythematous  Psych: Alert and oriented x3,  depressed mood, tearful during interview      Assessment and Plan:   The following treatment plan was discussed    1. Dry eye syndrome of both eyes  She will continue follow-up with her optometrist for treatment.  For now she will continue with her over-the-counter drops.  In the rare/unlikely event that her atorvastatin was contributing, we have switched her to rosuvastatin.    2. Hyperlipidemia, unspecified hyperlipidemia type  - rosuvastatin (CRESTOR) 20 MG Tab; Take 1 Tablet by mouth every evening.  Dispense: 30 Tablet; Refill: 11    3. Chronic nasal congestion  Discussed trial of OTC Flonase for at least 2 months.  She can continue to use her Allegra.  Encouraged her to do regular [place] pot or saline rinses.  If this is ineffective could consider ENT referral.    4. Snoring  Again, discussed concern for sleep apnea and importance of treating this if present.  New referral has been placed for sleep study.  - Referral to Pulmonary and Sleep Medicine    5. Prediabetes  - HEMOGLOBIN A1C; Future    6. Fatigue, unspecified type  Suspect this is multifactorial.  She is experiencing increased depression, could potentially have sleep apnea, and we will also obtain labs below for additional workup.  - Referral to Pulmonary and Sleep Medicine  - TSH WITH REFLEX TO FT4; Future  - CBC WITH DIFFERENTIAL; Future  - Comp Metabolic Panel; Future  - VITAMIN D,25 HYDROXY (DEFICIENCY);  Future    7. Moderate episode of recurrent major depressive disorder (HCC)  We will start Celexa since this has been helpful for her mother.  Discussed low-dose with up titration as needed.  Will follow-up in 4 weeks.  - citalopram (CELEXA) 10 MG tablet; Take 1 Tablet by mouth every day.  Dispense: 30 Tablet; Refill: 2    8. MILLER (nonalcoholic steatohepatitis), fibrosis stage 1a  She will follow-up with [facility] to get a repeat FibroScan that they recommended.    9. Class 2 obesity with body mass index (BMI) of 39.0 to 39.9 in adult, unspecified obesity type, unspecified whether serious comorbidity present  Have had extensive discussions with her about weight loss medications.  Unfortunately she does not qualify for GLP-1 agonist, has not tolerated other options in the past.  - Referral to Pulmonary and Sleep Medicine        Followup: Return in about 4 weeks (around 2/22/2024) for depression.

## 2024-01-26 NOTE — ASSESSMENT & PLAN NOTE
She reports depression has significantly worsened lately.  She is struggling with motivation and experiencing anhedonia and depressed mood on a regular basis.  Previously we had prescribed Lexapro but she never took it consistently and reports probably only taking it for a few weeks.  Her mom takes Celexa and she states that this works well for her.  Patient is interested in trying the Celexa.

## 2024-01-26 NOTE — ASSESSMENT & PLAN NOTE
Currently followed by [facility].  They recommended a yearly FibroScan and she plans to follow-up with them in the near future for this.  Unfortunately, she has gained weight since her last visit.

## 2024-02-22 ENCOUNTER — HOSPITAL ENCOUNTER (OUTPATIENT)
Dept: LAB | Facility: MEDICAL CENTER | Age: 54
End: 2024-02-22
Attending: INTERNAL MEDICINE
Payer: COMMERCIAL

## 2024-02-22 DIAGNOSIS — R73.03 PREDIABETES: ICD-10-CM

## 2024-02-22 DIAGNOSIS — R53.83 FATIGUE, UNSPECIFIED TYPE: ICD-10-CM

## 2024-02-22 LAB
25(OH)D3 SERPL-MCNC: 35 NG/ML (ref 30–100)
ALBUMIN SERPL BCP-MCNC: 4.1 G/DL (ref 3.2–4.9)
ALBUMIN/GLOB SERPL: 1.2 G/DL
ALP SERPL-CCNC: 84 U/L (ref 30–99)
ALT SERPL-CCNC: 36 U/L (ref 2–50)
ANION GAP SERPL CALC-SCNC: 10 MMOL/L (ref 7–16)
AST SERPL-CCNC: 28 U/L (ref 12–45)
BASOPHILS # BLD AUTO: 0.6 % (ref 0–1.8)
BASOPHILS # BLD: 0.04 K/UL (ref 0–0.12)
BILIRUB SERPL-MCNC: 0.4 MG/DL (ref 0.1–1.5)
BUN SERPL-MCNC: 9 MG/DL (ref 8–22)
CALCIUM ALBUM COR SERPL-MCNC: 9.1 MG/DL (ref 8.5–10.5)
CALCIUM SERPL-MCNC: 9.2 MG/DL (ref 8.5–10.5)
CHLORIDE SERPL-SCNC: 104 MMOL/L (ref 96–112)
CO2 SERPL-SCNC: 26 MMOL/L (ref 20–33)
CREAT SERPL-MCNC: 0.52 MG/DL (ref 0.5–1.4)
EOSINOPHIL # BLD AUTO: 0.31 K/UL (ref 0–0.51)
EOSINOPHIL NFR BLD: 4.8 % (ref 0–6.9)
ERYTHROCYTE [DISTWIDTH] IN BLOOD BY AUTOMATED COUNT: 42 FL (ref 35.9–50)
EST. AVERAGE GLUCOSE BLD GHB EST-MCNC: 140 MG/DL
GFR SERPLBLD CREATININE-BSD FMLA CKD-EPI: 110 ML/MIN/1.73 M 2
GLOBULIN SER CALC-MCNC: 3.5 G/DL (ref 1.9–3.5)
GLUCOSE SERPL-MCNC: 94 MG/DL (ref 65–99)
HBA1C MFR BLD: 6.5 % (ref 4–5.6)
HCT VFR BLD AUTO: 43.5 % (ref 37–47)
HGB BLD-MCNC: 14.4 G/DL (ref 12–16)
IMM GRANULOCYTES # BLD AUTO: 0.02 K/UL (ref 0–0.11)
IMM GRANULOCYTES NFR BLD AUTO: 0.3 % (ref 0–0.9)
LYMPHOCYTES # BLD AUTO: 2.76 K/UL (ref 1–4.8)
LYMPHOCYTES NFR BLD: 42.5 % (ref 22–41)
MCH RBC QN AUTO: 29.8 PG (ref 27–33)
MCHC RBC AUTO-ENTMCNC: 33.1 G/DL (ref 32.2–35.5)
MCV RBC AUTO: 89.9 FL (ref 81.4–97.8)
MONOCYTES # BLD AUTO: 0.44 K/UL (ref 0–0.85)
MONOCYTES NFR BLD AUTO: 6.8 % (ref 0–13.4)
NEUTROPHILS # BLD AUTO: 2.93 K/UL (ref 1.82–7.42)
NEUTROPHILS NFR BLD: 45 % (ref 44–72)
NRBC # BLD AUTO: 0 K/UL
NRBC BLD-RTO: 0 /100 WBC (ref 0–0.2)
PLATELET # BLD AUTO: 289 K/UL (ref 164–446)
PMV BLD AUTO: 10.6 FL (ref 9–12.9)
POTASSIUM SERPL-SCNC: 4.2 MMOL/L (ref 3.6–5.5)
PROT SERPL-MCNC: 7.6 G/DL (ref 6–8.2)
RBC # BLD AUTO: 4.84 M/UL (ref 4.2–5.4)
SODIUM SERPL-SCNC: 140 MMOL/L (ref 135–145)
TSH SERPL DL<=0.005 MIU/L-ACNC: 1.82 UIU/ML (ref 0.38–5.33)
WBC # BLD AUTO: 6.5 K/UL (ref 4.8–10.8)

## 2024-02-22 PROCEDURE — 82306 VITAMIN D 25 HYDROXY: CPT

## 2024-02-22 PROCEDURE — 36415 COLL VENOUS BLD VENIPUNCTURE: CPT

## 2024-02-22 PROCEDURE — 80053 COMPREHEN METABOLIC PANEL: CPT

## 2024-02-22 PROCEDURE — 84443 ASSAY THYROID STIM HORMONE: CPT

## 2024-02-22 PROCEDURE — 83036 HEMOGLOBIN GLYCOSYLATED A1C: CPT

## 2024-02-22 PROCEDURE — 85025 COMPLETE CBC W/AUTO DIFF WBC: CPT

## 2024-02-23 PROBLEM — E11.9 TYPE 2 DIABETES MELLITUS WITHOUT COMPLICATION, WITHOUT LONG-TERM CURRENT USE OF INSULIN (HCC): Status: ACTIVE | Noted: 2024-02-23

## 2024-03-01 ENCOUNTER — OFFICE VISIT (OUTPATIENT)
Dept: MEDICAL GROUP | Facility: MEDICAL CENTER | Age: 54
End: 2024-03-01
Attending: INTERNAL MEDICINE
Payer: COMMERCIAL

## 2024-03-01 VITALS
RESPIRATION RATE: 16 BRPM | DIASTOLIC BLOOD PRESSURE: 78 MMHG | OXYGEN SATURATION: 100 % | BODY MASS INDEX: 37.54 KG/M2 | HEIGHT: 62 IN | HEART RATE: 100 BPM | TEMPERATURE: 98.1 F | WEIGHT: 204 LBS | SYSTOLIC BLOOD PRESSURE: 102 MMHG

## 2024-03-01 DIAGNOSIS — E66.9 CLASS 2 OBESITY WITH BODY MASS INDEX (BMI) OF 39.0 TO 39.9 IN ADULT, UNSPECIFIED OBESITY TYPE, UNSPECIFIED WHETHER SERIOUS COMORBIDITY PRESENT: ICD-10-CM

## 2024-03-01 DIAGNOSIS — E11.9 TYPE 2 DIABETES MELLITUS WITHOUT COMPLICATION, WITHOUT LONG-TERM CURRENT USE OF INSULIN (HCC): ICD-10-CM

## 2024-03-01 DIAGNOSIS — R06.02 SHORTNESS OF BREATH: ICD-10-CM

## 2024-03-01 DIAGNOSIS — F33.1 MODERATE EPISODE OF RECURRENT MAJOR DEPRESSIVE DISORDER (HCC): ICD-10-CM

## 2024-03-01 DIAGNOSIS — R09.81 CHRONIC NASAL CONGESTION: ICD-10-CM

## 2024-03-01 DIAGNOSIS — E78.5 HYPERLIPIDEMIA, UNSPECIFIED HYPERLIPIDEMIA TYPE: ICD-10-CM

## 2024-03-01 PROBLEM — R35.0 URINARY FREQUENCY: Status: RESOLVED | Noted: 2024-03-01 | Resolved: 2024-03-01

## 2024-03-01 PROBLEM — R35.0 URINARY FREQUENCY: Status: ACTIVE | Noted: 2024-03-01

## 2024-03-01 PROCEDURE — 3078F DIAST BP <80 MM HG: CPT | Performed by: INTERNAL MEDICINE

## 2024-03-01 PROCEDURE — 3074F SYST BP LT 130 MM HG: CPT | Performed by: INTERNAL MEDICINE

## 2024-03-01 PROCEDURE — 99212 OFFICE O/P EST SF 10 MIN: CPT | Performed by: INTERNAL MEDICINE

## 2024-03-01 PROCEDURE — 99214 OFFICE O/P EST MOD 30 MIN: CPT | Performed by: INTERNAL MEDICINE

## 2024-03-01 RX ORDER — ROSUVASTATIN CALCIUM 20 MG/1
10 TABLET, COATED ORAL EVERY EVENING
Qty: 30 TABLET | Refills: 11 | Status: SHIPPED | OUTPATIENT
Start: 2024-03-01

## 2024-03-01 ASSESSMENT — FIBROSIS 4 INDEX: FIB4 SCORE: 0.86

## 2024-03-01 ASSESSMENT — PATIENT HEALTH QUESTIONNAIRE - PHQ9
4. FEELING TIRED OR HAVING LITTLE ENERGY: SEVERAL DAYS
9. THOUGHTS THAT YOU WOULD BE BETTER OFF DEAD, OR OF HURTING YOURSELF: NOT AT ALL
1. LITTLE INTEREST OR PLEASURE IN DOING THINGS: SEVERAL DAYS
SUM OF ALL RESPONSES TO PHQ QUESTIONS 1-9: 6
3. TROUBLE FALLING OR STAYING ASLEEP OR SLEEPING TOO MUCH: SEVERAL DAYS
8. MOVING OR SPEAKING SO SLOWLY THAT OTHER PEOPLE COULD HAVE NOTICED. OR THE OPPOSITE, BEING SO FIGETY OR RESTLESS THAT YOU HAVE BEEN MOVING AROUND A LOT MORE THAN USUAL: NOT AT ALL
5. POOR APPETITE OR OVEREATING: NOT AT ALL
2. FEELING DOWN, DEPRESSED, IRRITABLE, OR HOPELESS: SEVERAL DAYS
6. FEELING BAD ABOUT YOURSELF - OR THAT YOU ARE A FAILURE OR HAVE LET YOURSELF OR YOUR FAMILY DOWN: SEVERAL DAYS
7. TROUBLE CONCENTRATING ON THINGS, SUCH AS READING THE NEWSPAPER OR WATCHING TELEVISION: SEVERAL DAYS
SUM OF ALL RESPONSES TO PHQ9 QUESTIONS 1 AND 2: 2

## 2024-03-01 NOTE — ASSESSMENT & PLAN NOTE
Started on citalopram 10 mg daily at her last visit and reports significant improvement in her mood.  Feels like she got her appetite back, now has her sense of humor and range of emotions again.  Would like to continue with current dose.

## 2024-03-01 NOTE — ASSESSMENT & PLAN NOTE
She reports chronic shortness of breath that she has had for a few years.  Worse with activity and sometimes she notices associated wheezing especially at night when she is sitting reclined in a chair.  Also notices it is worse when she is using her shower squeegee.  Does not walk much, has always attributed it to being overweight.  No history of asthma or COPD.

## 2024-03-01 NOTE — PROGRESS NOTES
Subjective:   Sury Vidal is a 53 y.o. female here today for f/u chronic issues below    Shortness of breath  She reports chronic shortness of breath that she has had for a few years.  Worse with activity and sometimes she notices associated wheezing especially at night when she is sitting reclined in a chair.  Also notices it is worse when she is using her shower squeegee.  Does not walk much, has always attributed it to being overweight.  No history of asthma or COPD.     Obesity  Reports decreasing her portion sizes and choosing healthier foods as well as not eating out as much.  Weight is down by 11 pounds.      Chronic nasal congestion  Still complaining of chronic congestion however reports it has improved with Flonase although still not 100% better.  She has been using the Flonase regularly for about a month now.      Urinary frequency  Sometimes burning    Moderate episode of recurrent major depressive disorder (HCC)  Started on citalopram 10 mg daily at her last visit and reports significant improvement in her mood.  Feels like she got her appetite back, now has her sense of humor and range of emotions again.  Would like to continue with current dose.     Type 2 diabetes mellitus without complication, without long-term current use of insulin (HCC)  Most recent A1c is 6.5.  She is not currently on any medication diabetes control, reports she has changed her diet significantly and lost 11 pounds since last visit.    Hyperlipidemia  Currently taking Crestor, was concerned about atorvastatin having side effect of dry eyes.  Has not noticed any change in her eye symptoms with the switch.       Current medicines (including changes today)  Current Outpatient Medications   Medication Sig Dispense Refill    rosuvastatin (CRESTOR) 20 MG Tab Take 0.5 Tablets by mouth every evening. 30 Tablet 11    fluticasone (FLONASE) 50 MCG/ACT nasal spray Administer 1 Spray into affected nostril(S) every day.       citalopram (CELEXA) 10 MG tablet Take 1 Tablet by mouth every day. 30 Tablet 2    hydrOXYzine HCl (ATARAX) 25 MG Tab Take 0.5-1 Tablets by mouth at bedtime as needed (insomnia). 30 Tablet 5    acetaminophen (TYLENOL) 325 MG Tab Take 2 Tablets by mouth every 6 hours as needed for Mild Pain.      fexofenadine (ALLEGRA) 180 MG tablet Take 180 mg by mouth 1 time a day as needed (Allergies).       No current facility-administered medications for this visit.     She  has a past medical history of Anxiety, Depression, Fibroid (08/01/2019), GERD (gastroesophageal reflux disease), Hemorrhoids (10/01/2014), High cholesterol, Tachycardia, and Thyroid nodule.         Objective:     Vitals:    03/01/24 1415   BP: 102/78   Pulse: 100   Resp: 16   Temp: 36.7 °C (98.1 °F)   SpO2: 100%     Body mass index is 37.3 kg/m².   Physical Exam:  Constitutional: Alert, no distress.  Skin: Warm, dry, good turgor, no rashes in visible areas.  Eye: Equal, round and reactive, conjunctiva clear, lids normal.  Psych: Alert and oriented x3, normal affect and mood.      Results and Imaging:   Hospital Outpatient Visit on 02/22/2024   Component Date Value Ref Range Status    25-Hydroxy   Vitamin D 25 02/22/2024 35  30 - 100 ng/mL Final    Comment: Adult Ranges:   <20 ng/mL - Deficiency  20-29 ng/mL - Insufficiency   ng/mL - Sufficiency  Electrochemiluminescence binding assay performed using Roche rafael e  immunoassay analyzer.  The Ele[employer]ys Vitamin D total II assay is intended for  the quantitative determination of total 25 hydroxyvitamin D in human serum  and plasma. This assay is to be used as an aid in the assessment of vitamin  D sufficiency in adults.      Sodium 02/22/2024 140  135 - 145 mmol/L Final    Potassium 02/22/2024 4.2  3.6 - 5.5 mmol/L Final    Chloride 02/22/2024 104  96 - 112 mmol/L Final    Co2 02/22/2024 26  20 - 33 mmol/L Final    Anion Gap 02/22/2024 10.0  7.0 - 16.0 Final    Glucose 02/22/2024 94  65 - 99 mg/dL Final     Bun 02/22/2024 9  8 - 22 mg/dL Final    Creatinine 02/22/2024 0.52  0.50 - 1.40 mg/dL Final    Calcium 02/22/2024 9.2  8.5 - 10.5 mg/dL Final    Correct Calcium 02/22/2024 9.1  8.5 - 10.5 mg/dL Final    AST(SGOT) 02/22/2024 28  12 - 45 U/L Final    ALT(SGPT) 02/22/2024 36  2 - 50 U/L Final    Alkaline Phosphatase 02/22/2024 84  30 - 99 U/L Final    Total Bilirubin 02/22/2024 0.4  0.1 - 1.5 mg/dL Final    Albumin 02/22/2024 4.1  3.2 - 4.9 g/dL Final    Total Protein 02/22/2024 7.6  6.0 - 8.2 g/dL Final    Globulin 02/22/2024 3.5  1.9 - 3.5 g/dL Final    A-G Ratio 02/22/2024 1.2  g/dL Final    WBC 02/22/2024 6.5  4.8 - 10.8 K/uL Final    RBC 02/22/2024 4.84  4.20 - 5.40 M/uL Final    Hemoglobin 02/22/2024 14.4  12.0 - 16.0 g/dL Final    Hematocrit 02/22/2024 43.5  37.0 - 47.0 % Final    MCV 02/22/2024 89.9  81.4 - 97.8 fL Final    MCH 02/22/2024 29.8  27.0 - 33.0 pg Final    MCHC 02/22/2024 33.1  32.2 - 35.5 g/dL Final    Please note new reference range effective 05/22/2023.    RDW 02/22/2024 42.0  35.9 - 50.0 fL Final    Platelet Count 02/22/2024 289  164 - 446 K/uL Final    MPV 02/22/2024 10.6  9.0 - 12.9 fL Final    Neutrophils-Polys 02/22/2024 45.00  44.00 - 72.00 % Final    Lymphocytes 02/22/2024 42.50 (H)  22.00 - 41.00 % Final    Monocytes 02/22/2024 6.80  0.00 - 13.40 % Final    Eosinophils 02/22/2024 4.80  0.00 - 6.90 % Final    Basophils 02/22/2024 0.60  0.00 - 1.80 % Final    Immature Granulocytes 02/22/2024 0.30  0.00 - 0.90 % Final    Nucleated RBC 02/22/2024 0.00  0.00 - 0.20 /100 WBC Final    Please note new reference range effective 05/22/2023.    Neutrophils (Absolute) 02/22/2024 2.93  1.82 - 7.42 K/uL Final    Comment: Includes immature neutrophils, if present.  Please note new reference range effective 05/22/2023.      Lymphs (Absolute) 02/22/2024 2.76  1.00 - 4.80 K/uL Final    Monos (Absolute) 02/22/2024 0.44  0.00 - 0.85 K/uL Final    Eos (Absolute) 02/22/2024 0.31  0.00 - 0.51 K/uL Final    Baso  (Absolute) 02/22/2024 0.04  0.00 - 0.12 K/uL Final    Immature Granulocytes (abs) 02/22/2024 0.02  0.00 - 0.11 K/uL Final    NRBC (Absolute) 02/22/2024 0.00  K/uL Final    Glycohemoglobin 02/22/2024 6.5 (H)  4.0 - 5.6 % Final    Comment: Increased risk for diabetes:  5.7 -6.4%  Diabetes:  >6.4%  Glycemic control for adults with diabetes:  <7.0%    The above interpretations are per ADA guidelines.  Diagnosis  of diabetes mellitus on the basis of elevated Hemoglobin A1c  should be confirmed by repeating the Hb A1c test.      Est Avg Glucose 02/22/2024 140  mg/dL Final    Comment: The eAG calculation is based on the A1c-Derived Daily Glucose  (ADAG) study.  See the ADA's website for additional information.      TSH 02/22/2024 1.820  0.380 - 5.330 uIU/mL Final    Comment: The 2011 American Thyroid Association (ANDRAE) guidelines  recommended that the interpretation of thyroid function in  pregnancy be based on trimester specific reference ranges.    1st Trimester  0.100-2.500 mIU/L  2nd Trimester  0.200-3.000 mIU/L  3rd Trimester  0.300-3.500 mIU/L    These established reference ranges have not been validated  at [facility].      GFR (CKD-EPI) 02/22/2024 110  >60 mL/min/1.73 m 2 Final    Comment: Estimated Glomerular Filtration Rate is calculated using  race neutral CKD-EPI 2021 equation per NKF-ASN recommendations.           Assessment and Plan:   The following treatment plan was discussed    1. Shortness of breath  Suspect some component of deconditioning, possibly some restrictive lung disease due to obesity, may have some underlying reactive airway disease as well.  Discussed obtaining pulmonary function testing as neck step  - PULMONARY FUNCTION TESTS -Test requested: Complete Pulmonary Function Test; Include MIPS/MEPS? No; Future    2. Class 2 obesity with body mass index (BMI) of 39.0 to 39.9 in adult, unspecified obesity type, unspecified whether serious comorbidity present  Has lost weight with  dietary changes.  At this point, she prefers to avoid any medications to help with weight loss and would like to continue with lifestyle and dietary modifications.    3. Chronic nasal congestion  Improving with Flonase although not completely better.  We discussed continuing Flonase for another few months.  If still experiencing symptoms at that time, could consider ENT referral.    4. Moderate episode of recurrent major depressive disorder (HCC)  Improving with citalopram.  She would like to stay at current dose for now  -Continue citalopram 10 mg daily    5. Type 2 diabetes mellitus without complication, without long-term current use of insulin (HCC)  Desires to remain off of medication at this time.  Discussed repeating A1c in 3 months.  - HEMOGLOBIN A1C; Future    6. Hyperlipidemia, unspecified hyperlipidemia type  She will continue with rosuvastatin and we will get labs in 3 months.  - rosuvastatin (CRESTOR) 20 MG Tab; Take 0.5 Tablets by mouth every evening.  Dispense: 30 Tablet; Refill: 11  - Lipid Profile; Future        Followup: Return in about 3 months (around 6/1/2024).

## 2024-03-01 NOTE — ASSESSMENT & PLAN NOTE
Reports decreasing her portion sizes and choosing healthier foods as well as not eating out as much.  Weight is down by 11 pounds.

## 2024-03-01 NOTE — ASSESSMENT & PLAN NOTE
Currently taking Crestor, was concerned about atorvastatin having side effect of dry eyes.  Has not noticed any change in her eye symptoms with the switch.

## 2024-03-01 NOTE — ASSESSMENT & PLAN NOTE
Most recent A1c is 6.5.  She is not currently on any medication diabetes control, reports she has changed her diet significantly and lost 11 pounds since last visit.

## 2024-03-01 NOTE — ASSESSMENT & PLAN NOTE
Still complaining of chronic congestion however reports it has improved with Flonase although still not 100% better.  She has been using the Flonase regularly for about a month now.

## 2024-03-19 ENCOUNTER — NON-PROVIDER VISIT (OUTPATIENT)
Dept: SLEEP MEDICINE | Facility: MEDICAL CENTER | Age: 54
End: 2024-03-19
Attending: INTERNAL MEDICINE
Payer: COMMERCIAL

## 2024-03-19 VITALS — HEIGHT: 61 IN | WEIGHT: 203 LBS | BODY MASS INDEX: 38.33 KG/M2

## 2024-03-19 DIAGNOSIS — R06.02 SHORTNESS OF BREATH: ICD-10-CM

## 2024-03-19 PROCEDURE — 94060 EVALUATION OF WHEEZING: CPT | Mod: 26 | Performed by: INTERNAL MEDICINE

## 2024-03-19 PROCEDURE — 94729 DIFFUSING CAPACITY: CPT | Performed by: INTERNAL MEDICINE

## 2024-03-19 PROCEDURE — 94729 DIFFUSING CAPACITY: CPT | Mod: 26 | Performed by: INTERNAL MEDICINE

## 2024-03-19 PROCEDURE — 94726 PLETHYSMOGRAPHY LUNG VOLUMES: CPT | Performed by: INTERNAL MEDICINE

## 2024-03-19 PROCEDURE — 94726 PLETHYSMOGRAPHY LUNG VOLUMES: CPT | Mod: 26 | Performed by: INTERNAL MEDICINE

## 2024-03-19 PROCEDURE — 94060 EVALUATION OF WHEEZING: CPT | Performed by: INTERNAL MEDICINE

## 2024-03-19 ASSESSMENT — PULMONARY FUNCTION TESTS
FEV1: 2.1
FEV1/FVC_PERCENT_CHANGE: 120
FVC: 2.51
FEV1/FVC_PERCENT_PREDICTED: 105
FEV1/FVC_PERCENT_CHANGE: 1
FEV1_PERCENT_CHANGE: 5
FEV1/FVC: 84
FVC_PREDICTED: 3
FEV1/FVC_PERCENT_PREDICTED: 80
FEV1_PERCENT_CHANGE: 6
FEV1/FVC_PREDICTED: 80
FEV1_PERCENT_PREDICTED: 80
FEV1_LLN: 2.01
FVC: 2.35
FVC_PERCENT_PREDICTED: 83
FEV1/FVC_PERCENT_PREDICTED: 102
FEV1/FVC_PERCENT_LLN: 67
FEV1/FVC_PERCENT_PREDICTED: 103
FEV1_PERCENT_PREDICTED: 87
FEV1_PREDICTED: 2.41
FVC_PERCENT_PREDICTED: 78
FVC_LLN: 2.51
FEV1/FVC_PERCENT_PREDICTED: 103
FEV1/FVC: 82
FEV1/FVC: 83.67
FEV1: 1.94
FEV1/FVC: 83

## 2024-03-19 ASSESSMENT — FIBROSIS 4 INDEX: FIB4 SCORE: 0.86

## 2024-03-19 NOTE — PROCEDURES
Technician: RAGHU Flores    Technician Comment:  Good patient effort & cooperation.  The results of this test meet the ATS/ERS standards for acceptability & reproducibility.  Test was performed on the [employer] Body Plethysmograph-Elite DX system.  Predicted values were GLI-2012 for spirometry, GLI-2020 for DLCO, ITS for Lung Volumes.  The DLCO was uncorrected for Hgb.  A bronchodilator of Albuterol HFA -2puffs via spacer administered.  DLCO performed during dilation period.    Function test  Date of study 3/19/2024  Interpreting physician: Radha Mckeon  Reason for study: Shortness of breath    Results:  Spirometry:  FVC is 2.35 L which is 98% predicted without a significant change postbronchodilator  FEV1 is 1.94 L which is 80% predicted without a significant change postbronchodilator  FEV1/FVC is 84    Lung volumes:  TLC is 4.21 L which is 91% predicted  RV is 1.74 L which is 101% predicted    Diffusion capacity:  DLCO is 22.14 which is 119% predicted  DL/VA is 5.76 which is 141% predicted    Interpretation:  1.  Spirometry is normal  2.  There is no significant change postbronchodilator.  There is a suggestion of bronchial reactivity in the mid flow values which can be seen in reactive airways disease, correlate clinically  3.  The flow-volume loop is consistent with the spirometry data  4.  Lung volumes are normal  5.  Diffusion capacity is normal  6.  There are no prior studies for comparison    I, Dr. Radha Mckeon have interpreted and dictated the results of this study.    Radha Mckeon MD RD  Pulmonary and Critical Care    Available on [facility]

## 2024-04-19 ENCOUNTER — HOSPITAL ENCOUNTER (OUTPATIENT)
Dept: LAB | Facility: MEDICAL CENTER | Age: 54
End: 2024-04-19
Attending: INTERNAL MEDICINE
Payer: COMMERCIAL

## 2024-04-19 DIAGNOSIS — E78.5 HYPERLIPIDEMIA, UNSPECIFIED HYPERLIPIDEMIA TYPE: ICD-10-CM

## 2024-04-19 LAB
CHOLEST SERPL-MCNC: 180 MG/DL (ref 100–199)
FASTING STATUS PATIENT QL REPORTED: NORMAL
HDLC SERPL-MCNC: 48 MG/DL
LDLC SERPL CALC-MCNC: 101 MG/DL
TRIGL SERPL-MCNC: 156 MG/DL (ref 0–149)

## 2024-04-19 PROCEDURE — 36415 COLL VENOUS BLD VENIPUNCTURE: CPT

## 2024-04-19 PROCEDURE — 80061 LIPID PANEL: CPT

## 2024-04-23 DIAGNOSIS — F33.1 MODERATE EPISODE OF RECURRENT MAJOR DEPRESSIVE DISORDER (HCC): ICD-10-CM

## 2024-04-23 RX ORDER — CITALOPRAM HYDROBROMIDE 10 MG/1
10 TABLET ORAL DAILY
Qty: 30 TABLET | Refills: 5 | Status: SHIPPED | OUTPATIENT
Start: 2024-04-23

## 2024-04-23 NOTE — TELEPHONE ENCOUNTER
Received request via: Pharmacy    Was the patient seen in the last year in this department? Yes    Does the patient have an active prescription (recently filled or refills available) for medication(s) requested? No    Pharm[facility] Name: [facility]    Does the patient have correction Plus and need 100 day supply (blood pressure, diabetes and cholesterol meds only)? Patient does not have [facility]    Future Appointments         Provider Department [place]    5/22/2024 10:00 AM [facility]    5/31/2024 1:40 PM (Arrive by 1:25 PM) Edelmira Saucedo M.D. [facility]

## 2024-04-24 ENCOUNTER — PATIENT MESSAGE (OUTPATIENT)
Dept: MEDICAL GROUP | Facility: MEDICAL CENTER | Age: 54
End: 2024-04-24
Payer: COMMERCIAL

## 2024-04-24 DIAGNOSIS — E78.5 HYPERLIPIDEMIA, UNSPECIFIED HYPERLIPIDEMIA TYPE: ICD-10-CM

## 2024-04-26 RX ORDER — ATORVASTATIN CALCIUM 20 MG/1
20 TABLET, FILM COATED ORAL DAILY
Qty: 90 TABLET | Refills: 3 | Status: SHIPPED | OUTPATIENT
Start: 2024-04-26

## 2024-05-22 ENCOUNTER — HOSPITAL ENCOUNTER (OUTPATIENT)
Dept: LAB | Facility: MEDICAL CENTER | Age: 54
End: 2024-05-22
Attending: INTERNAL MEDICINE
Payer: COMMERCIAL

## 2024-05-22 DIAGNOSIS — E11.9 TYPE 2 DIABETES MELLITUS WITHOUT COMPLICATION, WITHOUT LONG-TERM CURRENT USE OF INSULIN (HCC): ICD-10-CM

## 2024-05-22 LAB
EST. AVERAGE GLUCOSE BLD GHB EST-MCNC: 126 MG/DL
HBA1C MFR BLD: 6 % (ref 4–5.6)

## 2024-05-31 ENCOUNTER — OFFICE VISIT (OUTPATIENT)
Dept: MEDICAL GROUP | Facility: MEDICAL CENTER | Age: 54
End: 2024-05-31
Attending: INTERNAL MEDICINE
Payer: COMMERCIAL

## 2024-05-31 VITALS
HEART RATE: 91 BPM | RESPIRATION RATE: 16 BRPM | OXYGEN SATURATION: 97 % | HEIGHT: 62 IN | BODY MASS INDEX: 37.08 KG/M2 | WEIGHT: 201.5 LBS | SYSTOLIC BLOOD PRESSURE: 106 MMHG | DIASTOLIC BLOOD PRESSURE: 70 MMHG | TEMPERATURE: 97.7 F

## 2024-05-31 DIAGNOSIS — G47.00 INSOMNIA, UNSPECIFIED TYPE: ICD-10-CM

## 2024-05-31 DIAGNOSIS — E78.5 HYPERLIPIDEMIA, UNSPECIFIED HYPERLIPIDEMIA TYPE: ICD-10-CM

## 2024-05-31 DIAGNOSIS — E11.9 TYPE 2 DIABETES MELLITUS WITHOUT COMPLICATION, WITHOUT LONG-TERM CURRENT USE OF INSULIN (HCC): ICD-10-CM

## 2024-05-31 DIAGNOSIS — F33.1 MODERATE EPISODE OF RECURRENT MAJOR DEPRESSIVE DISORDER (HCC): ICD-10-CM

## 2024-05-31 PROBLEM — R06.02 SHORTNESS OF BREATH: Status: RESOLVED | Noted: 2024-03-01 | Resolved: 2024-05-31

## 2024-05-31 RX ORDER — LOTILANER OPHTHALMIC SOLUTION 2.5 MG/ML
SOLUTION/ DROPS OPHTHALMIC
COMMUNITY
Start: 2024-05-16

## 2024-05-31 RX ORDER — CITALOPRAM 20 MG/1
20 TABLET ORAL DAILY
Qty: 90 TABLET | Refills: 1 | Status: SHIPPED | OUTPATIENT
Start: 2024-05-31

## 2024-05-31 ASSESSMENT — FIBROSIS 4 INDEX: FIB4 SCORE: 0.87

## 2024-05-31 NOTE — ASSESSMENT & PLAN NOTE
At her last visit, we elected not to start medication.  She has changed her diet and she is down to an A1c of 6.0.

## 2024-05-31 NOTE — ASSESSMENT & PLAN NOTE
We had tried her on 10 mg of rosuvastatin because she was worried that she was getting side effects from the atorvastatin.  Her lipid panel worsened a little bit on this regimen and she is back on the atorvastatin.  She denies side effects at this time.

## 2024-05-31 NOTE — PROGRESS NOTES
Subjective:   Sury Vidal is a 54 y.o. female here today for f/u depression, labs, medications    Moderate episode of recurrent major depressive disorder (HCC)  She continues to report some symptoms of depression although she does feel improved.  States that she lacks motivation to do things that she needs to accomplish, is not being very social or going out, and is still feeling down and lethargic.  She is interested in increasing her dose of citalopram.      Type 2 diabetes mellitus without complication, without long-term current use of insulin (HCC)  At her last visit, we elected not to start medication.  She has changed her diet and she is down to an A1c of 6.0.    Insomnia  She reports significant improvement in her symptoms with the hydroxyzine.    Hyperlipidemia  We had tried her on 10 mg of rosuvastatin because she was worried that she was getting side effects from the atorvastatin.  Her lipid panel worsened a little bit on this regimen and she is back on the atorvastatin.  She denies side effects at this time.       Current medicines (including changes today)  Current Outpatient Medications   Medication Sig Dispense Refill    XDEMVY 0.25 % Solution INSTILL ONE DROP INTO BOTH EYES TWICE A DAY FOR SIX WEEKS      citalopram (CELEXA) 20 MG Tab Take 1 Tablet by mouth every day. 90 Tablet 1    atorvastatin (LIPITOR) 20 MG Tab Take 1 Tablet by mouth every day. 90 Tablet 3    fluticasone (FLONASE) 50 MCG/ACT nasal spray Administer 1 Spray into affected nostril(S) every day.      hydrOXYzine HCl (ATARAX) 25 MG Tab Take 0.5-1 Tablets by mouth at bedtime as needed (insomnia). 30 Tablet 5    acetaminophen (TYLENOL) 325 MG Tab Take 2 Tablets by mouth every 6 hours as needed for Mild Pain.      fexofenadine (ALLEGRA) 180 MG tablet Take 180 mg by mouth 1 time a day as needed (Allergies).       No current facility-administered medications for this visit.     She  has a past medical history of Anxiety, Depression,  Fibroid (08/01/2019), GERD (gastroesophageal reflux disease), Hemorrhoids (10/01/2014), High cholesterol, Tachycardia, and Thyroid nodule.         Objective:     Vitals:    05/31/24 1340   BP: 106/70   Pulse: 91   Resp: 16   Temp: 36.5 °C (97.7 °F)   SpO2: 97%     Body mass index is 36.85 kg/m².   Physical Exam:  Constitutional: Alert, no distress.  Skin: Warm, dry, good turgor, no rashes in visible areas.  Eye: Equal, round and reactive, conjunctiva clear, lids normal.  Psych: Alert and oriented x3, normal affect and mood.      Results and Imaging:   Hospital Outpatient Visit on 05/22/2024   Component Date Value Ref Range Status    Glycohemoglobin 05/22/2024 6.0 (H)  4.0 - 5.6 % Final    Comment: Increased risk for diabetes:  5.7 -6.4%  Diabetes:  >6.4%  Glycemic control for adults with diabetes:  <7.0%    The above interpretations are per ADA guidelines.  Diagnosis  of diabetes mellitus on the basis of elevated Hemoglobin A1c  should be confirmed by repeating the Hb A1c test.      Est Avg Glucose 05/22/2024 126  mg/dL Final    Comment: The eAG calculation is based on the A1c-Derived Daily Glucose  (ADAG) study.  See the ADA's website for additional information.       Lab Results   Component Value Date/Time    CHOLSTRLTOT 180 04/19/2024 10:11 AM     (H) 04/19/2024 10:11 AM    HDL 48 04/19/2024 10:11 AM    TRIGLYCERIDE 156 (H) 04/19/2024 10:11 AM       Lab Results   Component Value Date/Time    SODIUM 140 02/22/2024 03:46 PM    POTASSIUM 4.2 02/22/2024 03:46 PM    CHLORIDE 104 02/22/2024 03:46 PM    CO2 26 02/22/2024 03:46 PM    GLUCOSE 94 02/22/2024 03:46 PM    BUN 9 02/22/2024 03:46 PM    CREATININE 0.52 02/22/2024 03:46 PM     Lab Results   Component Value Date/Time    ALKPHOSPHAT 84 02/22/2024 03:46 PM    ASTSGOT 28 02/22/2024 03:46 PM    ALTSGPT 36 02/22/2024 03:46 PM    TBILIRUBIN 0.4 02/22/2024 03:46 PM          Assessment and Plan:   The following treatment plan was discussed    1. Moderate episode of  recurrent major depressive disorder (HCC)  Improving but still some persistent symptoms.  We discussed uptitrating citalopram to 20 mg daily.  Discussed that this may take 4 to 6 weeks to show benefit.  She will keep me updated on her progress via [employer] and we will follow-up in 3 months.  - citalopram (CELEXA) 20 MG Tab; Take 1 Tablet by mouth every day.  Dispense: 90 Tablet; Refill: 1    2. Hyperlipidemia, unspecified hyperlipidemia type  With her back on the atorvastatin, we will obtain lipid panel before her next visit  -Continue atorvastatin 20 mg daily  - Lipid Profile; Future    3. Type 2 diabetes mellitus without complication, without long-term current use of insulin (HCC)  Improving with lifestyle changes.  We will obtain A1c before her next visit  - HEMOGLOBIN A1C; Future    4. Insomnia, unspecified type  Improved with hydroxyzine which she will continue as needed.        Followup: Return in about 3 months (around 8/31/2024) for diabetes, depression.

## 2024-05-31 NOTE — ASSESSMENT & PLAN NOTE
She continues to report some symptoms of depression although she does feel improved.  States that she lacks motivation to do things that she needs to accomplish, is not being very social or going out, and is still feeling down and lethargic.  She is interested in increasing her dose of citalopram.

## 2024-08-27 ENCOUNTER — HOSPITAL ENCOUNTER (OUTPATIENT)
Dept: LAB | Facility: MEDICAL CENTER | Age: 54
End: 2024-08-27
Attending: INTERNAL MEDICINE
Payer: COMMERCIAL

## 2024-08-27 DIAGNOSIS — E78.5 HYPERLIPIDEMIA, UNSPECIFIED HYPERLIPIDEMIA TYPE: ICD-10-CM

## 2024-08-27 DIAGNOSIS — E11.9 TYPE 2 DIABETES MELLITUS WITHOUT COMPLICATION, WITHOUT LONG-TERM CURRENT USE OF INSULIN (HCC): ICD-10-CM

## 2024-08-27 LAB
CHOLEST SERPL-MCNC: 170 MG/DL (ref 100–199)
EST. AVERAGE GLUCOSE BLD GHB EST-MCNC: 137 MG/DL
FASTING STATUS PATIENT QL REPORTED: NORMAL
HBA1C MFR BLD: 6.4 % (ref 4–5.6)
HDLC SERPL-MCNC: 44 MG/DL
LDLC SERPL CALC-MCNC: 94 MG/DL
TRIGL SERPL-MCNC: 161 MG/DL (ref 0–149)

## 2024-08-27 PROCEDURE — 36415 COLL VENOUS BLD VENIPUNCTURE: CPT

## 2024-08-27 PROCEDURE — 83036 HEMOGLOBIN GLYCOSYLATED A1C: CPT

## 2024-08-27 PROCEDURE — 80061 LIPID PANEL: CPT

## 2024-08-30 ENCOUNTER — HOSPITAL ENCOUNTER (OUTPATIENT)
Facility: MEDICAL CENTER | Age: 54
End: 2024-08-30
Attending: INTERNAL MEDICINE
Payer: COMMERCIAL

## 2024-08-30 ENCOUNTER — OFFICE VISIT (OUTPATIENT)
Dept: MEDICAL GROUP | Facility: MEDICAL CENTER | Age: 54
End: 2024-08-30
Attending: INTERNAL MEDICINE
Payer: COMMERCIAL

## 2024-08-30 VITALS
DIASTOLIC BLOOD PRESSURE: 68 MMHG | BODY MASS INDEX: 38.13 KG/M2 | SYSTOLIC BLOOD PRESSURE: 106 MMHG | HEIGHT: 62 IN | WEIGHT: 207.2 LBS | HEART RATE: 96 BPM | TEMPERATURE: 97.2 F | RESPIRATION RATE: 16 BRPM | OXYGEN SATURATION: 92 %

## 2024-08-30 DIAGNOSIS — F33.1 MODERATE EPISODE OF RECURRENT MAJOR DEPRESSIVE DISORDER (HCC): ICD-10-CM

## 2024-08-30 DIAGNOSIS — E78.5 HYPERLIPIDEMIA, UNSPECIFIED HYPERLIPIDEMIA TYPE: ICD-10-CM

## 2024-08-30 DIAGNOSIS — G47.00 INSOMNIA, UNSPECIFIED TYPE: ICD-10-CM

## 2024-08-30 DIAGNOSIS — K43.9 VENTRAL HERNIA WITHOUT OBSTRUCTION OR GANGRENE: ICD-10-CM

## 2024-08-30 DIAGNOSIS — E11.9 TYPE 2 DIABETES MELLITUS WITHOUT COMPLICATION, WITHOUT LONG-TERM CURRENT USE OF INSULIN (HCC): ICD-10-CM

## 2024-08-30 PROBLEM — R19.00 ABDOMINAL MASS: Status: ACTIVE | Noted: 2024-08-30

## 2024-08-30 PROCEDURE — 99213 OFFICE O/P EST LOW 20 MIN: CPT | Performed by: INTERNAL MEDICINE

## 2024-08-30 PROCEDURE — 82570 ASSAY OF URINE CREATININE: CPT

## 2024-08-30 PROCEDURE — 82043 UR ALBUMIN QUANTITATIVE: CPT

## 2024-08-30 RX ORDER — HYDROXYZINE HYDROCHLORIDE 25 MG/1
12.5-25 TABLET, FILM COATED ORAL NIGHTLY PRN
Qty: 30 TABLET | Refills: 5 | Status: SHIPPED | OUTPATIENT
Start: 2024-08-30

## 2024-08-30 RX ORDER — CITALOPRAM HYDROBROMIDE 20 MG/1
10 TABLET ORAL DAILY
Qty: 90 TABLET | Refills: 1 | Status: SHIPPED | OUTPATIENT
Start: 2024-08-30

## 2024-08-30 ASSESSMENT — FIBROSIS 4 INDEX: FIB4 SCORE: 0.87

## 2024-08-30 NOTE — PROGRESS NOTES
Subjective:   Sury Vidal is a 54 y.o. female here today for f/u meds, labs, issues below    Moderate episode of recurrent major depressive disorder (HCC)  At last visit, we increased her celexa to 20 mg.  She reports feeling more parra at this higher dose and only took it for a week before decreasing back to 10 mg.  At this time she prefers to stay on this dose.    Type 2 diabetes mellitus without complication, without long-term current use of insulin (HCC)  Most recent A1c has increased slightly to 6.4, patient reports being less careful with her diet.  Remains off medication.    Ventral hernia without obstruction or gangrene  Reports increased abdominal pain over the midline where she sees a protruding mass especially when she has been lifting items and cleaning.  Has not been painful in the past.  Has been present for many years, may have grown slightly per patient.    Insomnia  Taking hydroxyzine PRN, still beneficial.  Requesting refill.    Hyperlipidemia  Lipids now at goal, reviewed recent labs.  Continues on atorvastatin 20 mg daily.       Current medicines (including changes today)  Current Outpatient Medications   Medication Sig Dispense Refill    citalopram (CELEXA) 20 MG Tab Take 0.5 Tablets by mouth every day. 90 Tablet 1    hydrOXYzine HCl (ATARAX) 25 MG Tab Take 0.5-1 Tablets by mouth at bedtime as needed (insomnia). 30 Tablet 5    XDEMVY 0.25 % Solution INSTILL ONE DROP INTO BOTH EYES TWICE A DAY FOR SIX WEEKS      atorvastatin (LIPITOR) 20 MG Tab Take 1 Tablet by mouth every day. 90 Tablet 3    fluticasone (FLONASE) 50 MCG/ACT nasal spray Administer 1 Spray into affected nostril(S) every day.      acetaminophen (TYLENOL) 325 MG Tab Take 2 Tablets by mouth every 6 hours as needed for Mild Pain.      fexofenadine (ALLEGRA) 180 MG tablet Take 180 mg by mouth 1 time a day as needed (Allergies).       No current facility-administered medications for this visit.     She  has a past medical  history of Anxiety, Depression, Fibroid (08/01/2019), GERD (gastroesophageal reflux disease), Hemorrhoids (10/01/2014), High cholesterol, Tachycardia, and Thyroid nodule.         Objective:     Vitals:    08/30/24 1347   BP: 106/68   Pulse: 96   Resp: 16   Temp: 36.2 °C (97.2 °F)   SpO2: 92%     Body mass index is 37.9 kg/m².   Physical Exam:  Constitutional: Alert, no distress.  Skin: Warm, dry, good turgor, no rashes in visible areas.  Eye: Equal, round and reactive, conjunctiva clear, lids normal.  Psych: Alert and oriented x3, normal affect and mood.  Monofilament testing with a 10 gram force: sensation intact: intact bilaterally  Visual Inspection: Feet without maceration, ulcers, fissures.  Pedal pulses: intact bilaterally      Results and Imaging:   Hospital Outpatient Visit on 08/27/2024   Component Date Value Ref Range Status    Glycohemoglobin 08/27/2024 6.4 (H)  4.0 - 5.6 % Final    Comment: Increased risk for diabetes:  5.7 -6.4%  Diabetes:  >6.4%  Glycemic control for adults with diabetes:  <7.0%    The above interpretations are per ADA guidelines.  Diagnosis  of diabetes mellitus on the basis of elevated Hemoglobin A1c  should be confirmed by repeating the Hb A1c test.      Est Avg Glucose 08/27/2024 137  mg/dL Final    Comment: The eAG calculation is based on the A1c-Derived Daily Glucose  (ADAG) study.  See the ADA's website for additional information.      Cholesterol,Tot 08/27/2024 170  100 - 199 mg/dL Final    Triglycerides 08/27/2024 161 (H)  0 - 149 mg/dL Final    HDL 08/27/2024 44  >=40 mg/dL Final    LDL 08/27/2024 94  <100 mg/dL Final    Fasting Status 08/27/2024 Fasting   Final         Assessment and Plan:   The following treatment plan was discussed    1. Type 2 diabetes mellitus without complication, without long-term current use of insulin (HCC)  Stable off medication.  -f/u 3 mo for repeat A1c given recent increase  - POCT Microalbumin Creat Ratio Urine; Future  - Diabetic Monofilament  LE Exam    2. Moderate episode of recurrent major depressive disorder (HCC)  Stable, well controlled with current meds which were refilled today. Prefers 20 mg tabs (breaking in half) due to lower cost at pharmacy  - citalopram (CELEXA) 20 MG Tab; Take 0.5 Tablets by mouth every day.  Dispense: 90 Tablet; Refill: 1    3. Ventral hernia without obstruction or gangrene  Given new/increased pain, will eval with ultrasound to differentiate between true hernia which could require repair vs rectus diastasis  - US-HERNIA ABDOMEN; Future    4. Insomnia, unspecified type  Stable, well controlled with current meds which were refilled today.   - hydrOXYzine HCl (ATARAX) 25 MG Tab; Take 0.5-1 Tablets by mouth at bedtime as needed (insomnia).  Dispense: 30 Tablet; Refill: 5    5. Hyperlipidemia, unspecified hyperlipidemia type  Stable, cont current meds  -atorvastatin 20 mg daily        Followup: Return in about 3 months (around 11/30/2024) for diabetes.

## 2024-08-30 NOTE — ASSESSMENT & PLAN NOTE
Most recent A1c has increased slightly to 6.4, patient reports being less careful with her diet.  Remains off medication.

## 2024-08-30 NOTE — ASSESSMENT & PLAN NOTE
At last visit, we increased her celexa to 20 mg.  She reports feeling more parra at this higher dose and only took it for a week before decreasing back to 10 mg.  At this time she prefers to stay on this dose.

## 2024-08-30 NOTE — ASSESSMENT & PLAN NOTE
Reports increased abdominal pain over the midline where she sees a protruding mass especially when she has been lifting items and cleaning.  Has not been painful in the past.  Has been present for many years, may have grown slightly per patient.

## 2024-08-31 LAB
CREAT UR-MCNC: 37.2 MG/DL
MICROALBUMIN UR-MCNC: <1.2 MG/DL
MICROALBUMIN/CREAT UR: NORMAL MG/G (ref 0–30)

## 2024-09-05 ENCOUNTER — OFFICE VISIT (OUTPATIENT)
Dept: URGENT CARE | Facility: PHYSICIAN GROUP | Age: 54
End: 2024-09-05
Payer: COMMERCIAL

## 2024-09-05 VITALS
WEIGHT: 206 LBS | HEART RATE: 100 BPM | BODY MASS INDEX: 37.91 KG/M2 | SYSTOLIC BLOOD PRESSURE: 126 MMHG | HEIGHT: 62 IN | TEMPERATURE: 98.2 F | OXYGEN SATURATION: 96 % | DIASTOLIC BLOOD PRESSURE: 82 MMHG | RESPIRATION RATE: 20 BRPM

## 2024-09-05 DIAGNOSIS — J06.9 VIRAL URI WITH COUGH: ICD-10-CM

## 2024-09-05 PROCEDURE — 3074F SYST BP LT 130 MM HG: CPT | Performed by: NURSE PRACTITIONER

## 2024-09-05 PROCEDURE — 3079F DIAST BP 80-89 MM HG: CPT | Performed by: NURSE PRACTITIONER

## 2024-09-05 PROCEDURE — 99213 OFFICE O/P EST LOW 20 MIN: CPT | Performed by: NURSE PRACTITIONER

## 2024-09-05 ASSESSMENT — ENCOUNTER SYMPTOMS
MYALGIAS: 0
CHILLS: 0
WHEEZING: 0
ORTHOPNEA: 0
SPUTUM PRODUCTION: 0
SORE THROAT: 0
DIARRHEA: 0
HEADACHES: 0
EYE DISCHARGE: 0
SHORTNESS OF BREATH: 0
NAUSEA: 0
FEVER: 0
COUGH: 1

## 2024-09-05 ASSESSMENT — FIBROSIS 4 INDEX: FIB4 SCORE: 0.87

## 2024-09-05 NOTE — PROGRESS NOTES
Subjective     Sury Vidal is a 54 y.o. female who presents with Pharyngitis (X 5 days/ cough/ chest congestion )            HPI  New problem.  Patient is a 54-year-old female who presents with 5-day history of cough and nasal congestion.  She states this started with a sore throat on Saturday and progressed to her current symptoms as well as fatigue.  She denies any fever, chills, nausea, or diarrhea.  She denies any myalgias.  She has not been taking any medications lately for her symptoms.    Codeine  Current Outpatient Medications on File Prior to Visit   Medication Sig Dispense Refill    citalopram (CELEXA) 20 MG Tab Take 0.5 Tablets by mouth every day. 90 Tablet 1    hydrOXYzine HCl (ATARAX) 25 MG Tab Take 0.5-1 Tablets by mouth at bedtime as needed (insomnia). 30 Tablet 5    atorvastatin (LIPITOR) 20 MG Tab Take 1 Tablet by mouth every day. 90 Tablet 3    fluticasone (FLONASE) 50 MCG/ACT nasal spray Administer 1 Spray into affected nostril(S) every day.      acetaminophen (TYLENOL) 325 MG Tab Take 2 Tablets by mouth every 6 hours as needed for Mild Pain.      fexofenadine (ALLEGRA) 180 MG tablet Take 180 mg by mouth 1 time a day as needed (Allergies).      XDEMVY 0.25 % Solution INSTILL ONE DROP INTO BOTH EYES TWICE A DAY FOR SIX WEEKS (Patient not taking: Reported on 2024)       No current facility-administered medications on file prior to visit.     Social History     Socioeconomic History    Marital status: Single     Spouse name: Not on file    Number of children: Not on file    Years of education: Not on file    Highest education level: Not on file   Occupational History    Not on file   Tobacco Use    Smoking status: Former     Current packs/day: 0.00     Average packs/day: 0.3 packs/day for 5.0 years (1.3 ttl pk-yrs)     Types: Cigarettes     Start date: 2006     Quit date: 2011     Years since quittin.7    Smokeless tobacco: Never   Vaping Use    Vaping status: Never Used  "  Substance and Sexual Activity    Alcohol use: No     Comment: few drinks a year    Drug use: No    Sexual activity: Yes     Partners: Male   Other Topics Concern    Not on file   Social History Narrative    Sury has a beagle senior dog named LOPEZ      Social Determinants of Health     Financial Resource Strain: Not on file   Food Insecurity: Not on file   Transportation Needs: Not on file   Physical Activity: Not on file   Stress: Not on file   Social Connections: Not on file   Intimate Partner Violence: Not on file   Housing Stability: Not on file     Breast Cancer-related family history is not on file.      Review of Systems   Constitutional:  Positive for malaise/fatigue. Negative for chills and fever.   HENT:  Positive for congestion. Negative for sore throat.    Eyes:  Negative for discharge.   Respiratory:  Positive for cough. Negative for sputum production, shortness of breath and wheezing.    Cardiovascular:  Negative for chest pain and orthopnea.   Gastrointestinal:  Negative for diarrhea and nausea.   Musculoskeletal:  Negative for myalgias.   Neurological:  Negative for headaches.   Endo/Heme/Allergies:  Negative for environmental allergies.              Objective     /82   Pulse 100   Temp 36.8 °C (98.2 °F)   Resp 20   Ht 1.575 m (5' 2\")   Wt 93.4 kg (206 lb)   LMP 04/20/2017   SpO2 96%   BMI 37.68 kg/m²      Physical Exam  Vitals and nursing note reviewed.   Constitutional:       General: She is not in acute distress.     Appearance: Normal appearance. She is well-developed. She is not ill-appearing.   HENT:      Head: Normocephalic.      Right Ear: Tympanic membrane and external ear normal.      Left Ear: Tympanic membrane and external ear normal.      Nose: Mucosal edema, congestion and rhinorrhea present.      Mouth/Throat:      Pharynx: No posterior oropharyngeal erythema.   Eyes:      General:         Right eye: No discharge.         Left eye: No discharge.      Conjunctiva/sclera: " Conjunctivae normal.   Cardiovascular:      Rate and Rhythm: Normal rate and regular rhythm.      Heart sounds: Normal heart sounds.   Pulmonary:      Effort: Pulmonary effort is normal.      Breath sounds: Normal breath sounds.   Musculoskeletal:         General: Normal range of motion.      Cervical back: Normal range of motion and neck supple.   Lymphadenopathy:      Cervical: No cervical adenopathy.   Skin:     General: Skin is warm and dry.   Neurological:      Mental Status: She is alert and oriented to person, place, and time.   Psychiatric:         Behavior: Behavior normal.         Thought Content: Thought content normal.                             Assessment & Plan        Assessment & Plan  Viral URI with cough  Viral illness at this time with no indication for antibiotics. Reviewed with patient expected course of illness and also reviewed OTC medications that may be used for symptom relief. Follow up 7-10 days if not improving.

## 2024-09-24 ENCOUNTER — APPOINTMENT (OUTPATIENT)
Dept: URGENT CARE | Facility: PHYSICIAN GROUP | Age: 54
End: 2024-09-24
Payer: COMMERCIAL

## 2024-10-24 ENCOUNTER — HOSPITAL ENCOUNTER (OUTPATIENT)
Dept: RADIOLOGY | Facility: MEDICAL CENTER | Age: 54
End: 2024-10-24
Attending: INTERNAL MEDICINE
Payer: COMMERCIAL

## 2024-10-24 DIAGNOSIS — K43.9 VENTRAL HERNIA WITHOUT OBSTRUCTION OR GANGRENE: ICD-10-CM

## 2024-10-24 DIAGNOSIS — Z12.31 ENCOUNTER FOR MAMMOGRAM TO ESTABLISH BASELINE MAMMOGRAM: ICD-10-CM

## 2024-10-24 PROBLEM — M62.08 RECTUS DIASTASIS: Status: ACTIVE | Noted: 2024-10-24

## 2024-10-24 PROCEDURE — 76705 ECHO EXAM OF ABDOMEN: CPT

## 2024-10-24 PROCEDURE — 77067 SCR MAMMO BI INCL CAD: CPT

## 2024-10-30 ENCOUNTER — PATIENT MESSAGE (OUTPATIENT)
Dept: MEDICAL GROUP | Facility: MEDICAL CENTER | Age: 54
End: 2024-10-30
Payer: COMMERCIAL

## 2024-10-30 DIAGNOSIS — H01.023 SQUAMOUS BLEPHARITIS OF BOTH EYES, UNSPECIFIED EYELID: ICD-10-CM

## 2024-10-30 DIAGNOSIS — H02.883 MEIBOMIAN GLAND DYSFUNCTION (MGD) OF BOTH EYES: ICD-10-CM

## 2024-10-30 DIAGNOSIS — H04.123 DRY EYE SYNDROME OF BOTH EYES: ICD-10-CM

## 2024-10-30 DIAGNOSIS — H02.886 MEIBOMIAN GLAND DYSFUNCTION (MGD) OF BOTH EYES: ICD-10-CM

## 2024-10-30 DIAGNOSIS — H01.026 SQUAMOUS BLEPHARITIS OF BOTH EYES, UNSPECIFIED EYELID: ICD-10-CM

## 2024-12-05 ENCOUNTER — OFFICE VISIT (OUTPATIENT)
Dept: MEDICAL GROUP | Facility: MEDICAL CENTER | Age: 54
End: 2024-12-05
Attending: INTERNAL MEDICINE
Payer: COMMERCIAL

## 2024-12-05 VITALS
HEIGHT: 62 IN | HEART RATE: 97 BPM | TEMPERATURE: 97.7 F | DIASTOLIC BLOOD PRESSURE: 80 MMHG | SYSTOLIC BLOOD PRESSURE: 132 MMHG | OXYGEN SATURATION: 96 % | BODY MASS INDEX: 38.77 KG/M2 | RESPIRATION RATE: 16 BRPM | WEIGHT: 210.7 LBS

## 2024-12-05 DIAGNOSIS — F33.1 MODERATE EPISODE OF RECURRENT MAJOR DEPRESSIVE DISORDER (HCC): ICD-10-CM

## 2024-12-05 DIAGNOSIS — R10.13 EPIGASTRIC PAIN: ICD-10-CM

## 2024-12-05 DIAGNOSIS — E11.9 TYPE 2 DIABETES MELLITUS WITHOUT COMPLICATION, WITHOUT LONG-TERM CURRENT USE OF INSULIN (HCC): ICD-10-CM

## 2024-12-05 LAB
HBA1C MFR BLD: 6.5 % (ref ?–5.8)
POCT INT CON NEG: NEGATIVE
POCT INT CON POS: POSITIVE

## 2024-12-05 PROCEDURE — 83036 HEMOGLOBIN GLYCOSYLATED A1C: CPT | Performed by: INTERNAL MEDICINE

## 2024-12-05 PROCEDURE — 99214 OFFICE O/P EST MOD 30 MIN: CPT | Performed by: INTERNAL MEDICINE

## 2024-12-05 PROCEDURE — 3079F DIAST BP 80-89 MM HG: CPT | Performed by: INTERNAL MEDICINE

## 2024-12-05 PROCEDURE — 3075F SYST BP GE 130 - 139MM HG: CPT | Performed by: INTERNAL MEDICINE

## 2024-12-05 PROCEDURE — 99213 OFFICE O/P EST LOW 20 MIN: CPT | Performed by: INTERNAL MEDICINE

## 2024-12-05 RX ORDER — OMEPRAZOLE 40 MG/1
40 CAPSULE, DELAYED RELEASE ORAL DAILY
Qty: 30 CAPSULE | Refills: 0 | Status: SHIPPED | OUTPATIENT
Start: 2024-12-05

## 2024-12-05 RX ORDER — PROPYLENE GLYCOL 0.6 G/100ML
LIQUID OPHTHALMIC
COMMUNITY

## 2024-12-05 ASSESSMENT — FIBROSIS 4 INDEX: FIB4 SCORE: 0.87

## 2024-12-05 NOTE — ASSESSMENT & PLAN NOTE
She continues to report upper abdominal/epigastric pain at least 5 to 6 days/week.  She notices that when she eats it feels better.  She does report some GERD for which she has been taking Pepto-Bismol.  She reports that this does help a little bit.  She is also tried eliminating coffee and spicy food from her diet.  She denies melena.  She does have occasional hematochezia which she attributes to hemorrhoids.  She denies diarrhea or constipation.  She feels like her abdomen is always distended and bloated.

## 2024-12-06 NOTE — ASSESSMENT & PLAN NOTE
She is not currently on medication.  Her A1c in clinic today was 6.5, previously 6.4 when we last checked.

## 2024-12-06 NOTE — PROGRESS NOTES
Subjective:   Sury Vidal is a 54 y.o. female here today for f/u DM, abdominal pain    Epigastric pain  She continues to report upper abdominal/epigastric pain at least 5 to 6 days/week.  She notices that when she eats it feels better.  She does report some GERD for which she has been taking Pepto-Bismol.  She reports that this does help a little bit.  She is also tried eliminating coffee and spicy food from her diet.  She denies melena.  She does have occasional hematochezia which she attributes to hemorrhoids.  She denies diarrhea or constipation.  She feels like her abdomen is always distended and bloated.    Moderate episode of recurrent major depressive disorder (HCC)  She reports continuing to struggle with anxiety and depression.  She continues on citalopram 10 mg daily.  We had tried increasing her dose in the past but she did not feel well with a higher dose.  She is interested in starting to work with a therapist and is requesting a referral.    Type 2 diabetes mellitus without complication, without long-term current use of insulin (HCC)  She is not currently on medication.  Her A1c in clinic today was 6.5, previously 6.4 when we last checked.       Current medicines (including changes today)  Current Outpatient Medications   Medication Sig Dispense Refill    Propylene Glycol (VISTA MEIBO TEARS) 0.6 % Solution Administer  into affected eye(s).      Glycerin, PF, (OPTASE COMFORT DRY EYE) 1 % Solution Administer  into affected eye(s).      omeprazole (PRILOSEC) 40 MG delayed-release capsule Take 1 Capsule by mouth every day. 30 Capsule 0    citalopram (CELEXA) 20 MG Tab Take 0.5 Tablets by mouth every day. 90 Tablet 1    hydrOXYzine HCl (ATARAX) 25 MG Tab Take 0.5-1 Tablets by mouth at bedtime as needed (insomnia). 30 Tablet 5    atorvastatin (LIPITOR) 20 MG Tab Take 1 Tablet by mouth every day. 90 Tablet 3    fluticasone (FLONASE) 50 MCG/ACT nasal spray Administer 1 Spray into affected nostril(S)  every day.      acetaminophen (TYLENOL) 325 MG Tab Take 2 Tablets by mouth every 6 hours as needed for Mild Pain.       No current facility-administered medications for this visit.     She  has a past medical history of Anxiety, Depression, Fibroid (08/01/2019), GERD (gastroesophageal reflux disease), Hemorrhoids (10/01/2014), High cholesterol, Tachycardia, and Thyroid nodule.         Objective:     Vitals:    12/05/24 1354   BP: 132/80   Pulse: 97   Resp: 16   Temp: 36.5 °C (97.7 °F)   SpO2: 96%     Body mass index is 38.54 kg/m².   Physical Exam:  Constitutional: Alert, no distress.  Skin: Warm, dry, good turgor, no rashes in visible areas.  Eye: Equal, round and reactive, conjunctiva clear, lids normal.  Abdomen: Soft, non-tender, no masses, no hepatosplenomegaly.  Psych: Alert and oriented x3, normal affect and mood.      Results and Imaging:   Lab Results   Component Value Date/Time    HBA1C 6.5 (A) 12/05/2024 02:26 PM          Assessment and Plan:   The following treatment plan was discussed    1. Epigastric pain  Question duodenal ulcer given improvement after eating; would like to r/o H pylori and then start PPI x 2 weeks if H pylori negative and reassess  - H. PYLORI BREATH TEST  - omeprazole (PRILOSEC) 40 MG delayed-release capsule; Take 1 Capsule by mouth every day.  Dispense: 30 Capsule; Refill: 0    2. Type 2 diabetes mellitus without complication, without long-term current use of insulin (HCC)  Stable, diet controlled  - POCT  A1C    3. Moderate episode of recurrent major depressive disorder (HCC)  Uncontrolled, patient would like to start working with a therapist.  Referral placed.  Has not tolerated uptitration of celexa in the past  -cont celexa 10 mg daily  - Referral to Psychology        Followup: Return in about 3 months (around 3/5/2025).

## 2024-12-06 NOTE — ASSESSMENT & PLAN NOTE
She reports continuing to struggle with anxiety and depression.  She continues on citalopram 10 mg daily.  We had tried increasing her dose in the past but she did not feel well with a higher dose.  She is interested in starting to work with a therapist and is requesting a referral.

## 2025-02-28 ENCOUNTER — PATIENT MESSAGE (OUTPATIENT)
Dept: MEDICAL GROUP | Facility: MEDICAL CENTER | Age: 55
End: 2025-02-28
Payer: COMMERCIAL

## 2025-03-05 ENCOUNTER — HOSPITAL ENCOUNTER (OUTPATIENT)
Dept: LAB | Facility: MEDICAL CENTER | Age: 55
End: 2025-03-05
Attending: INTERNAL MEDICINE
Payer: COMMERCIAL

## 2025-03-05 PROCEDURE — 83013 H PYLORI (C-13) BREATH: CPT

## 2025-03-06 LAB — UREA BREATH TEST QL: NEGATIVE

## 2025-03-07 ENCOUNTER — RESULTS FOLLOW-UP (OUTPATIENT)
Dept: INTERNAL MEDICINE | Facility: OTHER | Age: 55
End: 2025-03-07
Payer: COMMERCIAL

## 2025-03-14 ENCOUNTER — PATIENT MESSAGE (OUTPATIENT)
Dept: MEDICAL GROUP | Facility: MEDICAL CENTER | Age: 55
End: 2025-03-14
Payer: COMMERCIAL

## 2025-03-27 ENCOUNTER — APPOINTMENT (OUTPATIENT)
Dept: URBAN - METROPOLITAN AREA CLINIC 15 | Facility: CLINIC | Age: 55
Setting detail: DERMATOLOGY
End: 2025-03-27

## 2025-03-27 DIAGNOSIS — L73.8 OTHER SPECIFIED FOLLICULAR DISORDERS: ICD-10-CM

## 2025-03-27 DIAGNOSIS — L71.8 OTHER ROSACEA: ICD-10-CM

## 2025-03-27 DIAGNOSIS — L82.1 OTHER SEBORRHEIC KERATOSIS: ICD-10-CM

## 2025-03-27 DIAGNOSIS — L57.0 ACTINIC KERATOSIS: ICD-10-CM

## 2025-03-27 DIAGNOSIS — L72.0 EPIDERMAL CYST: ICD-10-CM

## 2025-03-27 DIAGNOSIS — D22 MELANOCYTIC NEVI: ICD-10-CM

## 2025-03-27 PROBLEM — D22.0 MELANOCYTIC NEVI OF LIP: Status: ACTIVE | Noted: 2025-03-27

## 2025-03-27 PROCEDURE — ? COUNSELING

## 2025-03-27 PROCEDURE — ? ADDITIONAL NOTES

## 2025-03-27 PROCEDURE — ? LIQUID NITROGEN

## 2025-03-27 PROCEDURE — 99212 OFFICE O/P EST SF 10 MIN: CPT | Mod: 25

## 2025-03-27 ASSESSMENT — LOCATION DETAILED DESCRIPTION DERM
LOCATION DETAILED: RIGHT LATERAL MALAR CHEEK
LOCATION DETAILED: LEFT INFERIOR CENTRAL MALAR CHEEK
LOCATION DETAILED: LEFT INFERIOR MEDIAL FOREHEAD
LOCATION DETAILED: RIGHT LATERAL INFERIOR EYELID
LOCATION DETAILED: LEFT SUPERIOR MEDIAL BUCCAL CHEEK
LOCATION DETAILED: RIGHT INFERIOR LATERAL MALAR CHEEK
LOCATION DETAILED: LEFT LATERAL CANTHUS
LOCATION DETAILED: NASAL DORSUM
LOCATION DETAILED: LEFT LOWER CUTANEOUS LIP
LOCATION DETAILED: RIGHT LATERAL CANTHUS
LOCATION DETAILED: RIGHT LATERAL SUPERIOR EYELID
LOCATION DETAILED: RIGHT CENTRAL MALAR CHEEK
LOCATION DETAILED: LEFT LATERAL SUPERIOR EYELID
LOCATION DETAILED: LEFT LATERAL INFERIOR EYELID

## 2025-03-27 ASSESSMENT — LOCATION SIMPLE DESCRIPTION DERM
LOCATION SIMPLE: RIGHT CHEEK
LOCATION SIMPLE: LEFT LIP
LOCATION SIMPLE: RIGHT EYELID
LOCATION SIMPLE: RIGHT SUPERIOR EYELID
LOCATION SIMPLE: LEFT FOREHEAD
LOCATION SIMPLE: NOSE
LOCATION SIMPLE: LEFT SUPERIOR EYELID
LOCATION SIMPLE: LEFT EYELID
LOCATION SIMPLE: LEFT INFERIOR EYELID
LOCATION SIMPLE: RIGHT INFERIOR EYELID
LOCATION SIMPLE: LEFT CHEEK

## 2025-03-27 ASSESSMENT — LOCATION ZONE DERM
LOCATION ZONE: FACE
LOCATION ZONE: NOSE
LOCATION ZONE: LIP
LOCATION ZONE: EYELID

## 2025-03-27 NOTE — PROCEDURE: LIQUID NITROGEN
Consent: The patient's consent was obtained including but not limited to risks of crusting, scabbing, blistering, scarring, darker or lighter pigmentary change, recurrence, incomplete removal and infection.
Show Aperture Variable?: Yes
Render Post-Care Instructions In Note?: no
Detail Level: Detailed
Duration Of Freeze Thaw-Cycle (Seconds): 0
Post-Care Instructions: I reviewed with the patient in detail post-care instructions. Patient is to wear sunprotection, and avoid picking at any of the treated lesions. Pt may apply Vaseline to crusted or scabbing areas.
Spray Paint Text: The liquid nitrogen was applied to the skin utilizing a spray paint frosting technique.
Medical Necessity Clause: This procedure was medically necessary because the lesions that were treated were:  If lesion does not resolve, bx is needed.
Medical Necessity Information: It is in your best interest to select a reason for this procedure from the list below. All of these items fulfill various CMS LCD requirements except the new and changing color options.

## 2025-03-27 NOTE — PROCEDURE: ADDITIONAL NOTES
Detail Level: Detailed
Render Risk Assessment In Note?: no
Additional Notes: Pt is getting treatment with her eye doctor for the dry eyes.\\nDiscussed will hold off oral abx to see how she responds to treatment.

## 2025-06-11 DIAGNOSIS — E78.5 HYPERLIPIDEMIA, UNSPECIFIED HYPERLIPIDEMIA TYPE: ICD-10-CM

## 2025-06-11 RX ORDER — ATORVASTATIN CALCIUM 20 MG/1
20 TABLET, FILM COATED ORAL DAILY
Qty: 90 TABLET | Refills: 3 | Status: SHIPPED | OUTPATIENT
Start: 2025-06-11

## 2025-06-11 NOTE — TELEPHONE ENCOUNTER
Received request via: Pharmacy    Was the patient seen in the last year in this department? Yes    Does the patient have an active prescription (recently filled or refills available) for medication(s) requested? No    Pharm[facility] Name: [facility]    Does the patient have assisted Plus and need 100-day supply? (This applies to ALL medications) Patient does not have SCP

## 2025-06-14 ENCOUNTER — HOSPITAL ENCOUNTER (OUTPATIENT)
Facility: MEDICAL CENTER | Age: 55
End: 2025-06-14
Attending: PHYSICIAN ASSISTANT
Payer: COMMERCIAL

## 2025-06-14 ENCOUNTER — OFFICE VISIT (OUTPATIENT)
Dept: URGENT CARE | Facility: PHYSICIAN GROUP | Age: 55
End: 2025-06-14
Payer: COMMERCIAL

## 2025-06-14 VITALS
BODY MASS INDEX: 37.73 KG/M2 | OXYGEN SATURATION: 97 % | RESPIRATION RATE: 18 BRPM | HEART RATE: 95 BPM | DIASTOLIC BLOOD PRESSURE: 76 MMHG | HEIGHT: 62 IN | WEIGHT: 205.03 LBS | TEMPERATURE: 97.5 F | SYSTOLIC BLOOD PRESSURE: 124 MMHG

## 2025-06-14 DIAGNOSIS — R39.89 BLADDER PAIN: ICD-10-CM

## 2025-06-14 DIAGNOSIS — N30.01 ACUTE CYSTITIS WITH HEMATURIA: Primary | ICD-10-CM

## 2025-06-14 DIAGNOSIS — N76.0 ACUTE VAGINITIS: ICD-10-CM

## 2025-06-14 DIAGNOSIS — N30.01 ACUTE CYSTITIS WITH HEMATURIA: ICD-10-CM

## 2025-06-14 LAB
APPEARANCE UR: CLEAR
BILIRUB UR STRIP-MCNC: NEGATIVE MG/DL
COLOR UR AUTO: NORMAL
GLUCOSE UR STRIP.AUTO-MCNC: NEGATIVE MG/DL
KETONES UR STRIP.AUTO-MCNC: NEGATIVE MG/DL
LEUKOCYTE ESTERASE UR QL STRIP.AUTO: NORMAL
NITRITE UR QL STRIP.AUTO: NEGATIVE
PH UR STRIP.AUTO: 6 [PH] (ref 5–8)
PROT UR QL STRIP: NEGATIVE MG/DL
RBC UR QL AUTO: NORMAL
SP GR UR STRIP.AUTO: 1
UROBILINOGEN UR STRIP-MCNC: 0.2 MG/DL

## 2025-06-14 PROCEDURE — 87086 URINE CULTURE/COLONY COUNT: CPT

## 2025-06-14 PROCEDURE — 3074F SYST BP LT 130 MM HG: CPT | Performed by: PHYSICIAN ASSISTANT

## 2025-06-14 PROCEDURE — 81002 URINALYSIS NONAUTO W/O SCOPE: CPT | Performed by: PHYSICIAN ASSISTANT

## 2025-06-14 PROCEDURE — 99213 OFFICE O/P EST LOW 20 MIN: CPT | Performed by: PHYSICIAN ASSISTANT

## 2025-06-14 PROCEDURE — 3078F DIAST BP <80 MM HG: CPT | Performed by: PHYSICIAN ASSISTANT

## 2025-06-14 RX ORDER — NITROFURANTOIN 25; 75 MG/1; MG/1
100 CAPSULE ORAL 2 TIMES DAILY
Qty: 14 CAPSULE | Refills: 0 | Status: SHIPPED | OUTPATIENT
Start: 2025-06-14 | End: 2025-06-21

## 2025-06-14 ASSESSMENT — FIBROSIS 4 INDEX: FIB4 SCORE: 0.89

## 2025-06-14 NOTE — PROGRESS NOTES
"Subjective     Sury Vidal is a 55 y.o. female who presents with Bladder Problem (Pain ,burning frequency X 1 DAY )    PMH:  has a past medical history of Anxiety, Depression, Fibroid (08/01/2019), GERD (gastroesophageal reflux disease), Hemorrhoids (10/01/2014), High cholesterol, Tachycardia, and Thyroid nodule.  MEDS: Current Medications[1]  ALLERGIES: Allergies[2]  SURGHX: Past Surgical History[3]  SOCHX:  reports that she quit smoking about 13 years ago. Her smoking use included cigarettes. She started smoking about 18 years ago. She has a 1.3 pack-year smoking history. She has never used smokeless tobacco. She reports that she does not drink alcohol and does not use drugs.  FH: Reviewed with patient, not pertinent to this visit.           Patient presents with dysuria, urgency and frequency since yesterday afternoon with worsening symptoms.  pT denies fever, chills, flank pain. No otc medications, No other complaints.         Review of Systems   Genitourinary:  Positive for dysuria, frequency and urgency. Negative for flank pain and hematuria.   All other systems reviewed and are negative.             Objective     /76   Pulse 95   Temp 36.4 °C (97.5 °F) (Temporal)   Resp 18   Ht 1.575 m (5' 2\")   Wt 93 kg (205 lb 0.4 oz)   LMP 04/20/2017   SpO2 97%   BMI 37.50 kg/m²      Physical Exam  Vitals and nursing note reviewed.   Constitutional:       General: She is not in acute distress.     Appearance: Normal appearance. She is well-developed. She is not ill-appearing or toxic-appearing.   HENT:      Head: Normocephalic and atraumatic.      Nose: Nose normal.      Mouth/Throat:      Mouth: Mucous membranes are moist.   Eyes:      Extraocular Movements: Extraocular movements intact.      Conjunctiva/sclera: Conjunctivae normal.      Pupils: Pupils are equal, round, and reactive to light.   Cardiovascular:      Rate and Rhythm: Normal rate and regular rhythm.      Pulses: Normal pulses.      " Heart sounds: Normal heart sounds.   Pulmonary:      Effort: Pulmonary effort is normal.      Breath sounds: Normal breath sounds.   Abdominal:      General: Bowel sounds are normal.      Palpations: Abdomen is soft.      Tenderness: There is no guarding or rebound.   Musculoskeletal:         General: Normal range of motion.      Cervical back: Normal range of motion and neck supple.   Skin:     General: Skin is warm and dry.      Capillary Refill: Capillary refill takes less than 2 seconds.   Neurological:      General: No focal deficit present.      Mental Status: She is alert and oriented to person, place, and time.      Gait: Gait normal.   Psychiatric:         Mood and Affect: Mood normal.         Behavior: Behavior is cooperative.                                  Assessment & Plan  Acute cystitis with hematuria    Orders:    URINE CULTURE(NEW); Future    nitrofurantoin (MACROBID) 100 MG Cap; Take 1 Capsule by mouth 2 times a day for 7 days.    Bladder pain    Orders:    POCT Urinalysis    URINE CULTURE(NEW); Future    nitrofurantoin (MACROBID) 100 MG Cap; Take 1 Capsule by mouth 2 times a day for 7 days.       Results for orders placed or performed in visit on 06/14/25   POCT Urinalysis    Collection Time: 06/14/25  9:15 AM   Result Value Ref Range    POC Color light yellow Negative    POC Appearance clear Negative    POC Glucose negative Negative mg/dL    POC Bilirubin negative Negative mg/dL    POC Ketones negative Negative mg/dL    POC Specific Gravity 1.005 <1.005 - >1.030    POC Blood large Negative    POC Urine PH 6.0 5.0 - 8.0    POC Protein negative Negative mg/dL    POC Urobiligen 0.2 Negative (0.2) mg/dL    POC Nitrites negative Negative    POC Leukocyte Esterase trace Negative      PT HPI and PE are consistent with uti symptoms.  I will treat with macrobid x 7 days while waiting for culture results.     Culture sent to lab, will call with any necessary treatment or treatment changes.       Differential diagnosis, supportive care, and indications for immediate follow-up discussed with patient.  Instructed to return to clinic or nearest emergency department for any change in condition, further concerns, or worsening of symptoms.    I personally reviewed prior external notes and test results pertinent to today's visit.  I have independently reviewed and interpreted all diagnostics ordered during this urgent care visit.    PT should follow up with PCP in 1-2 days for re-evaluation if symptoms have not improved.      Discussed red flags and reasons to return to UC or ED.      Pt and/or family verbalized understanding of diagnosis and follow up instructions and was offered informational handout on diagnosis.  PT discharged.     Please note that this dictation was created using voice recognition software. I have made every reasonable attempt to correct obvious errors, but I expect that there may be errors of grammar and possibly content that I did not discover before finalizing the note.                [1]   Current Outpatient Medications:     nitrofurantoin (MACROBID) 100 MG Cap, Take 1 Capsule by mouth 2 times a day for 7 days., Disp: 14 Capsule, Rfl: 0    atorvastatin (LIPITOR) 20 MG Tab, TAKE ONE TABLET BY MOUTH ONCE DAILY, Disp: 90 Tablet, Rfl: 3    Propylene Glycol (VISTA [place] TEARS) 0.6 % Solution, Administer  into affected eye(s)., Disp: , Rfl:     Glycerin, PF, (OPTASE COMFORT DRY EYE) 1 % Solution, Administer  into affected eye(s)., Disp: , Rfl:     citalopram (CELEXA) 20 MG Tab, Take 0.5 Tablets by mouth every day., Disp: 90 Tablet, Rfl: 1    hydrOXYzine HCl (ATARAX) 25 MG Tab, Take 0.5-1 Tablets by mouth at bedtime as needed (insomnia)., Disp: 30 Tablet, Rfl: 5    fluticasone (FLONASE) 50 MCG/ACT nasal spray, Administer 1 Spray into affected nostril(S) every day., Disp: , Rfl:     acetaminophen (TYLENOL) 325 MG Tab, Take 2 Tablets by mouth every 6 hours as needed for Mild Pain., Disp: , Rfl:      omeprazole (PRILOSEC) 40 MG delayed-release capsule, Take 1 Capsule by mouth every day. (Patient not taking: Reported on 6/14/2025), Disp: 30 Capsule, Rfl: 0  [2]   Allergies  Allergen Reactions    Codeine Itching     Pt states that she is sensative   [3]   Past Surgical History:  Procedure Laterality Date    TAMEKA BY LAPAROSCOPY N/A 8/12/2022    Procedure: CHOLECYSTECTOMY, LAPAROSCOPIC;  Surgeon: Junaid Calzada M.D.;  Location: SURGERY [facility];  Service: General    LIVER BIOPSY N/A 8/12/2022    Procedure: BIOPSY, LIVER;  Surgeon: Junaid Calzada M.D.;  Location: SURGERY [facility];  Service: General    BARTHOLIN GLAND EXCISION      SALPINGO-OOPHORECTOMY, UNILATERAL      dermoid cyst, age 19

## 2025-06-15 ASSESSMENT — ENCOUNTER SYMPTOMS: FLANK PAIN: 0

## 2025-06-16 LAB
BACTERIA UR CULT: NORMAL
SIGNIFICANT IND 70042: NORMAL
SITE SITE: NORMAL
SOURCE SOURCE: NORMAL

## (undated) DEVICE — TOWEL STOP TIMEOUT SAFETY FLAG (40EA/CA)

## (undated) DEVICE — GLOVE SIZE 6.5  SURGEON ACCELERATOR FREE GREEN (50PR/BX)

## (undated) DEVICE — BAG RETRIEVAL 10ML (10EA/BX)

## (undated) DEVICE — SUTURE 0 COATED VICRYL 6-18IN - (12PK/BX)

## (undated) DEVICE — GLOVE BIOGEL SZ 7 SURGICAL PF LTX - (50PR/BX 4BX/CA)

## (undated) DEVICE — SET EXTENSION WITH 2 PORTS (48EA/CA) ***PART #2C8610 IS A SUBSTITUTE*****

## (undated) DEVICE — TROCAR 5X100 BLADED Z-THREAD - KII (6/BX)

## (undated) DEVICE — TROCAR Z THREAD 11 X 100 - BLADED (6/BX)

## (undated) DEVICE — SYRINGE 30 ML LL (56/BX)

## (undated) DEVICE — CANISTER SUCTION 3000ML MECHANICAL FILTER AUTO SHUTOFF MEDI-VAC NONSTERILE LF DISP  (40EA/CA)

## (undated) DEVICE — NEEDLE MONOPTY 14GAX16CM - (10EA/CA)

## (undated) DEVICE — SUTURE GENERAL

## (undated) DEVICE — GLOVE SZ 7.5 BIOGEL PI MICRO - PF LF (50PR/BX)

## (undated) DEVICE — ELECTRODE DUAL RETURN W/ CORD - (50/PK)

## (undated) DEVICE — LACTATED RINGERS INJ 1000 ML - (14EA/CA 60CA/PF)

## (undated) DEVICE — CLIP MED LG INTNL HRZN TI ESCP - (20/BX)

## (undated) DEVICE — CANNULA W/SEAL 5X100 Z-THRE - ADED KII (12/BX)

## (undated) DEVICE — SODIUM CHL IRRIGATION 0.9% 1000ML (12EA/CA)

## (undated) DEVICE — GLOVE BIOGEL PI INDICATOR SZ 7.0 SURGICAL PF LF - (50/BX 4BX/CA)

## (undated) DEVICE — TUBE CONNECT SUCTION CLEAR 120 X 1/4" (50EA/CA)"

## (undated) DEVICE — GLOVE BIOGEL SZ 7.5 SURGICAL PF LTX - (50PR/BX 4BX/CA)

## (undated) DEVICE — PACK LAP CHOLE OR - (2EA/CA)

## (undated) DEVICE — SET SUCTION/IRRIGATION WITH DISPOSABLE TIP (6/CA )PART #0250-070-520 IS A SUB

## (undated) DEVICE — SET LEADWIRE 5 LEAD BEDSIDE DISPOSABLE ECG (1SET OF 5/EA)

## (undated) DEVICE — SENSOR OXIMETER ADULT SPO2 RD SET (20EA/BX)

## (undated) DEVICE — HEMOSTAT ABSORBABLE POWDER SURGICEL 3G (5EA/BX)

## (undated) DEVICE — SUCTION INSTRUMENT YANKAUER BULBOUS TIP W/O VENT (50EA/CA)

## (undated) DEVICE — CHLORAPREP 26 ML APPLICATOR - ORANGE TINT(25/CA)

## (undated) DEVICE — DRESSING NON ADHERENT 3 X 4 - STERILE (100/BX 12BX/CA)

## (undated) DEVICE — GOWN WARMING X-LARGE FLEX - (20/CA)

## (undated) DEVICE — TUBING CLEARLINK DUO-VENT - C-FLO (48EA/CA)

## (undated) DEVICE — SUTURE 4-0 VICRYL PLUS FS-2 - 27 INCH (36/BX)

## (undated) DEVICE — APPLICATOR ENDOSCOPIC SURGICEL (5EA/BX)

## (undated) DEVICE — SUTURE 2-0 VICRYL PLUS CT-2 - 27 INCH (36/BX)

## (undated) DEVICE — SET TUBING PNEUMOCLEAR HIGH FLOW SMOKE EVACUATION (10EA/BX)